# Patient Record
Sex: MALE | Race: BLACK OR AFRICAN AMERICAN | NOT HISPANIC OR LATINO | Employment: OTHER | URBAN - METROPOLITAN AREA
[De-identification: names, ages, dates, MRNs, and addresses within clinical notes are randomized per-mention and may not be internally consistent; named-entity substitution may affect disease eponyms.]

---

## 2021-12-06 ENCOUNTER — TELEPHONE (OUTPATIENT)
Dept: TRANSPLANT | Facility: CLINIC | Age: 66
End: 2021-12-06
Payer: MEDICARE

## 2021-12-06 DIAGNOSIS — I50.9 CONGESTIVE HEART FAILURE, UNSPECIFIED HF CHRONICITY, UNSPECIFIED HEART FAILURE TYPE: Primary | ICD-10-CM

## 2021-12-29 ENCOUNTER — LAB VISIT (OUTPATIENT)
Dept: LAB | Facility: HOSPITAL | Age: 66
End: 2021-12-29
Attending: INTERNAL MEDICINE
Payer: MEDICARE

## 2021-12-29 ENCOUNTER — OFFICE VISIT (OUTPATIENT)
Dept: TRANSPLANT | Facility: CLINIC | Age: 66
End: 2021-12-29
Attending: INTERNAL MEDICINE
Payer: COMMERCIAL

## 2021-12-29 ENCOUNTER — HOSPITAL ENCOUNTER (OUTPATIENT)
Dept: PULMONOLOGY | Facility: CLINIC | Age: 66
Discharge: HOME | End: 2021-12-29
Attending: INTERNAL MEDICINE
Payer: COMMERCIAL

## 2021-12-29 VITALS
BODY MASS INDEX: 29.03 KG/M2 | WEIGHT: 226.19 LBS | HEART RATE: 89 BPM | HEIGHT: 74 IN | DIASTOLIC BLOOD PRESSURE: 67 MMHG | SYSTOLIC BLOOD PRESSURE: 103 MMHG

## 2021-12-29 VITALS — WEIGHT: 237 LBS | BODY MASS INDEX: 30.42 KG/M2 | HEIGHT: 74 IN

## 2021-12-29 DIAGNOSIS — I73.9 CLAUDICATION IN PERIPHERAL VASCULAR DISEASE: ICD-10-CM

## 2021-12-29 DIAGNOSIS — I50.9 CONGESTIVE HEART FAILURE, UNSPECIFIED HF CHRONICITY, UNSPECIFIED HEART FAILURE TYPE: ICD-10-CM

## 2021-12-29 DIAGNOSIS — I34.0 NONRHEUMATIC MITRAL VALVE REGURGITATION: ICD-10-CM

## 2021-12-29 DIAGNOSIS — I27.22 PULMONARY HYPERTENSION DUE TO LEFT HEART DISEASE: ICD-10-CM

## 2021-12-29 DIAGNOSIS — I25.5 ISCHEMIC CARDIOMYOPATHY: ICD-10-CM

## 2021-12-29 DIAGNOSIS — E78.5 HYPERLIPIDEMIA LDL GOAL <70: ICD-10-CM

## 2021-12-29 DIAGNOSIS — I73.9 PERIPHERAL VASCULAR DISEASE: ICD-10-CM

## 2021-12-29 DIAGNOSIS — I25.10 CORONARY ARTERY DISEASE INVOLVING NATIVE CORONARY ARTERY OF NATIVE HEART WITHOUT ANGINA PECTORIS: ICD-10-CM

## 2021-12-29 DIAGNOSIS — I50.42 CHRONIC COMBINED SYSTOLIC AND DIASTOLIC CONGESTIVE HEART FAILURE: Primary | ICD-10-CM

## 2021-12-29 LAB
ALBUMIN SERPL BCP-MCNC: 3.4 G/DL (ref 3.5–5.2)
ALP SERPL-CCNC: 221 U/L (ref 55–135)
ALT SERPL W/O P-5'-P-CCNC: 22 U/L (ref 10–44)
ANION GAP SERPL CALC-SCNC: 8 MMOL/L (ref 8–16)
AST SERPL-CCNC: 21 U/L (ref 10–40)
BASOPHILS # BLD AUTO: 0.02 K/UL (ref 0–0.2)
BASOPHILS NFR BLD: 0.4 % (ref 0–1.9)
BILIRUB SERPL-MCNC: 1.4 MG/DL (ref 0.1–1)
BNP SERPL-MCNC: 1128 PG/ML (ref 0–99)
BUN SERPL-MCNC: 11 MG/DL (ref 8–23)
CALCIUM SERPL-MCNC: 9.3 MG/DL (ref 8.7–10.5)
CHLORIDE SERPL-SCNC: 105 MMOL/L (ref 95–110)
CO2 SERPL-SCNC: 27 MMOL/L (ref 23–29)
CREAT SERPL-MCNC: 0.9 MG/DL (ref 0.5–1.4)
DIFFERENTIAL METHOD: ABNORMAL
EOSINOPHIL # BLD AUTO: 0.1 K/UL (ref 0–0.5)
EOSINOPHIL NFR BLD: 1.6 % (ref 0–8)
ERYTHROCYTE [DISTWIDTH] IN BLOOD BY AUTOMATED COUNT: 15.2 % (ref 11.5–14.5)
EST. GFR  (AFRICAN AMERICAN): >60 ML/MIN/1.73 M^2
EST. GFR  (NON AFRICAN AMERICAN): >60 ML/MIN/1.73 M^2
GLUCOSE SERPL-MCNC: 216 MG/DL (ref 70–110)
HCT VFR BLD AUTO: 40.9 % (ref 40–54)
HGB BLD-MCNC: 13.2 G/DL (ref 14–18)
IMM GRANULOCYTES # BLD AUTO: 0.01 K/UL (ref 0–0.04)
IMM GRANULOCYTES NFR BLD AUTO: 0.2 % (ref 0–0.5)
LYMPHOCYTES # BLD AUTO: 1.7 K/UL (ref 1–4.8)
LYMPHOCYTES NFR BLD: 33.9 % (ref 18–48)
MCH RBC QN AUTO: 29 PG (ref 27–31)
MCHC RBC AUTO-ENTMCNC: 32.3 G/DL (ref 32–36)
MCV RBC AUTO: 90 FL (ref 82–98)
MONOCYTES # BLD AUTO: 0.4 K/UL (ref 0.3–1)
MONOCYTES NFR BLD: 8.4 % (ref 4–15)
NEUTROPHILS # BLD AUTO: 2.8 K/UL (ref 1.8–7.7)
NEUTROPHILS NFR BLD: 55.5 % (ref 38–73)
NRBC BLD-RTO: 0 /100 WBC
PLATELET # BLD AUTO: 149 K/UL (ref 150–450)
PMV BLD AUTO: 10 FL (ref 9.2–12.9)
POTASSIUM SERPL-SCNC: 3.5 MMOL/L (ref 3.5–5.1)
PROT SERPL-MCNC: 7.3 G/DL (ref 6–8.4)
RBC # BLD AUTO: 4.55 M/UL (ref 4.6–6.2)
SODIUM SERPL-SCNC: 140 MMOL/L (ref 136–145)
TSH SERPL DL<=0.005 MIU/L-ACNC: 0.89 UIU/ML (ref 0.4–4)
WBC # BLD AUTO: 4.98 K/UL (ref 3.9–12.7)

## 2021-12-29 PROCEDURE — 3288F FALL RISK ASSESSMENT DOCD: CPT | Mod: CPTII,S$GLB,TXP, | Performed by: INTERNAL MEDICINE

## 2021-12-29 PROCEDURE — 1101F PR PT FALLS ASSESS DOC 0-1 FALLS W/OUT INJ PAST YR: ICD-10-PCS | Mod: CPTII,S$GLB,TXP, | Performed by: INTERNAL MEDICINE

## 2021-12-29 PROCEDURE — 3008F BODY MASS INDEX DOCD: CPT | Mod: CPTII,S$GLB,TXP, | Performed by: INTERNAL MEDICINE

## 2021-12-29 PROCEDURE — 36415 COLL VENOUS BLD VENIPUNCTURE: CPT | Mod: NTX | Performed by: INTERNAL MEDICINE

## 2021-12-29 PROCEDURE — 99999 PR PBB SHADOW E&M-EST. PATIENT-LVL III: ICD-10-PCS | Mod: PBBFAC,TXP,, | Performed by: INTERNAL MEDICINE

## 2021-12-29 PROCEDURE — 83880 ASSAY OF NATRIURETIC PEPTIDE: CPT | Mod: NTX | Performed by: INTERNAL MEDICINE

## 2021-12-29 PROCEDURE — 84443 ASSAY THYROID STIM HORMONE: CPT | Mod: NTX | Performed by: INTERNAL MEDICINE

## 2021-12-29 PROCEDURE — 3074F SYST BP LT 130 MM HG: CPT | Mod: CPTII,S$GLB,TXP, | Performed by: INTERNAL MEDICINE

## 2021-12-29 PROCEDURE — 1101F PT FALLS ASSESS-DOCD LE1/YR: CPT | Mod: CPTII,S$GLB,TXP, | Performed by: INTERNAL MEDICINE

## 2021-12-29 PROCEDURE — 99244 OFF/OP CNSLTJ NEW/EST MOD 40: CPT | Mod: S$GLB,TXP,, | Performed by: INTERNAL MEDICINE

## 2021-12-29 PROCEDURE — 3288F PR FALLS RISK ASSESSMENT DOCUMENTED: ICD-10-PCS | Mod: CPTII,S$GLB,TXP, | Performed by: INTERNAL MEDICINE

## 2021-12-29 PROCEDURE — 99999 PR PBB SHADOW E&M-EST. PATIENT-LVL III: CPT | Mod: PBBFAC,TXP,, | Performed by: INTERNAL MEDICINE

## 2021-12-29 PROCEDURE — 1126F AMNT PAIN NOTED NONE PRSNT: CPT | Mod: CPTII,S$GLB,TXP, | Performed by: INTERNAL MEDICINE

## 2021-12-29 PROCEDURE — 94618 PULMONARY STRESS TESTING: ICD-10-PCS | Mod: NTX,S$GLB,, | Performed by: INTERNAL MEDICINE

## 2021-12-29 PROCEDURE — 1159F MED LIST DOCD IN RCRD: CPT | Mod: CPTII,S$GLB,TXP, | Performed by: INTERNAL MEDICINE

## 2021-12-29 PROCEDURE — 3008F PR BODY MASS INDEX (BMI) DOCUMENTED: ICD-10-PCS | Mod: CPTII,S$GLB,TXP, | Performed by: INTERNAL MEDICINE

## 2021-12-29 PROCEDURE — 94618 PULMONARY STRESS TESTING: CPT | Mod: NTX,S$GLB,, | Performed by: INTERNAL MEDICINE

## 2021-12-29 PROCEDURE — 1159F PR MEDICATION LIST DOCUMENTED IN MEDICAL RECORD: ICD-10-PCS | Mod: CPTII,S$GLB,TXP, | Performed by: INTERNAL MEDICINE

## 2021-12-29 PROCEDURE — 85025 COMPLETE CBC W/AUTO DIFF WBC: CPT | Mod: NTX | Performed by: INTERNAL MEDICINE

## 2021-12-29 PROCEDURE — 99244 PR OFFICE CONSULTATION,LEVEL IV: ICD-10-PCS | Mod: S$GLB,TXP,, | Performed by: INTERNAL MEDICINE

## 2021-12-29 PROCEDURE — 3078F DIAST BP <80 MM HG: CPT | Mod: CPTII,S$GLB,TXP, | Performed by: INTERNAL MEDICINE

## 2021-12-29 PROCEDURE — 1160F RVW MEDS BY RX/DR IN RCRD: CPT | Mod: CPTII,S$GLB,TXP, | Performed by: INTERNAL MEDICINE

## 2021-12-29 PROCEDURE — 3078F PR MOST RECENT DIASTOLIC BLOOD PRESSURE < 80 MM HG: ICD-10-PCS | Mod: CPTII,S$GLB,TXP, | Performed by: INTERNAL MEDICINE

## 2021-12-29 PROCEDURE — 1160F PR REVIEW ALL MEDS BY PRESCRIBER/CLIN PHARMACIST DOCUMENTED: ICD-10-PCS | Mod: CPTII,S$GLB,TXP, | Performed by: INTERNAL MEDICINE

## 2021-12-29 PROCEDURE — 1126F PR PAIN SEVERITY QUANTIFIED, NO PAIN PRESENT: ICD-10-PCS | Mod: CPTII,S$GLB,TXP, | Performed by: INTERNAL MEDICINE

## 2021-12-29 PROCEDURE — 3074F PR MOST RECENT SYSTOLIC BLOOD PRESSURE < 130 MM HG: ICD-10-PCS | Mod: CPTII,S$GLB,TXP, | Performed by: INTERNAL MEDICINE

## 2021-12-29 PROCEDURE — 80053 COMPREHEN METABOLIC PANEL: CPT | Mod: NTX | Performed by: INTERNAL MEDICINE

## 2021-12-29 RX ORDER — SPIRONOLACTONE 25 MG/1
25 TABLET ORAL DAILY
COMMUNITY
Start: 2021-12-29

## 2021-12-29 RX ORDER — METOPROLOL SUCCINATE 25 MG/1
25 TABLET, EXTENDED RELEASE ORAL 2 TIMES DAILY
COMMUNITY
Start: 2021-12-02 | End: 2022-04-05

## 2021-12-29 RX ORDER — VITAMIN B COMPLEX
1 CAPSULE ORAL DAILY
COMMUNITY

## 2021-12-29 RX ORDER — ATORVASTATIN CALCIUM 80 MG/1
80 TABLET, FILM COATED ORAL DAILY
COMMUNITY
Start: 2021-10-22 | End: 2022-04-04

## 2021-12-29 RX ORDER — CLOPIDOGREL BISULFATE 75 MG/1
75 TABLET ORAL DAILY
COMMUNITY
Start: 2021-12-20 | End: 2022-04-05

## 2021-12-29 RX ORDER — VIT C/E/ZN/COPPR/LUTEIN/ZEAXAN 250MG-90MG
1000 CAPSULE ORAL DAILY
COMMUNITY

## 2021-12-29 RX ORDER — POTASSIUM CHLORIDE 20 MEQ/1
20 TABLET, EXTENDED RELEASE ORAL DAILY
COMMUNITY
Start: 2021-10-22

## 2021-12-29 RX ORDER — BUMETANIDE 1 MG/1
1 TABLET ORAL DAILY
COMMUNITY
Start: 2021-11-18

## 2021-12-29 RX ORDER — SACUBITRIL AND VALSARTAN 49; 51 MG/1; MG/1
49-51 TABLET, FILM COATED ORAL 2 TIMES DAILY
COMMUNITY
Start: 2021-10-28

## 2021-12-29 NOTE — LETTER
January 2, 2022        Rico Barnhart  [address] [place] [address]-[place] [address]  Phone: [phone]  Fax: [phone]             [id number]  [address]  [facility] [address]  Phone: [phone]   Patient: Avery Earl   MR Number: [id number]   YOB: 1955   Date of Visit: 12/29/2021       Dear Dr. Rico Barnhart    Thank you for referring Avery Earl to me for evaluation. Attached you will find relevant portions of my assessment and plan of care.    If you have questions, please do not hesitate to call me. I look forward to following Avery Earl along with you.    Sincerely,    Duarte Simpson Jr, MD    Enclosure    If you would like to receive this communication electronically, please contact [email or web address] or [phone] to request InnoPad Link access.    InnoPad Link is a tool which provides read-only access to select patient information with whom you have a relationship. Its easy to use and provides real time access to review your patients record including encounter summaries, notes, results, and demographic information.    If you feel you have received this communication in error or would no longer like to receive these types of communications, please e-mail [email or web address]

## 2022-01-02 PROBLEM — I34.0 NONRHEUMATIC MITRAL VALVE REGURGITATION: Status: ACTIVE | Noted: 2022-01-02

## 2022-01-02 PROBLEM — I25.5 ISCHEMIC CARDIOMYOPATHY: Status: ACTIVE | Noted: 2022-01-02

## 2022-01-02 PROBLEM — I73.9 PERIPHERAL VASCULAR DISEASE: Status: ACTIVE | Noted: 2022-01-02

## 2022-01-02 PROBLEM — I27.22 PULMONARY HYPERTENSION DUE TO LEFT HEART DISEASE: Status: ACTIVE | Noted: 2022-01-02

## 2022-01-02 PROBLEM — E78.5 HYPERLIPIDEMIA LDL GOAL <70: Status: ACTIVE | Noted: 2022-01-02

## 2022-01-02 PROBLEM — I25.10 CORONARY ARTERY DISEASE INVOLVING NATIVE CORONARY ARTERY OF NATIVE HEART WITHOUT ANGINA PECTORIS: Status: ACTIVE | Noted: 2022-01-02

## 2022-01-02 NOTE — PROGRESS NOTES
Subjective:   Initial evaluation of heart transplant candidacy.    HPI:  Mr. Earl is a 66 y.o. year old Black or  male who has presents to be considered for advanced surgical options.  He has a history of coronary artery disease with prior coronary bypass surgery.  He had recent angiogram that demonstrated patent bypass grafts to the LAD and right PDA with severe mitral insufficiency and pulmonary hypertension secondary to left heart disease.  He has extensive peripheral arterial disease with prior revascularization procedures and ongoing claudication.  He is a former smoker but quit in 1990. He was drinking 3 shots of liquor per day but quit this 2 months ago.  He has never used any illicit drugs.  His last angiogram February 2021 described patent bypass grafts.  He has severe left ventricular systolic dysfunction and severe mitral insufficiency.    At this time he denies any angina.  He does have dyspnea on exertion as well as leg claudication both which limit him.  He sleeps on 2 pillows.  No PND spells.  No symptomatic arrhythmia.      He reports an episode of pneumonia earlier this month.    Past Medical History:   Diagnosis Date    Arthritis     Cardiomyopathy     Ischemic    CHF (congestive heart failure)     Coronary artery disease     Dyslipidemia     Hyperlipidemia     Hypertension     Mitral insufficiency     Myocardial infarction     Peripheral arterial disease     Pneumonia 12/2021    Pulmonary hypertension      Past Surgical History:   Procedure Laterality Date    CORONARY ARTERY BYPASS GRAFT      Lima to LAD, saphenous vein graft to right PDA    INSERTION OF IMPLANTABLE CARDIOVERTER-DEFIBRILLATOR (ICD) GENERATOR WITH TWO EXISTING LEADS       Review of Systems   Constitutional: Positive for malaise/fatigue. Negative for chills, fever, night sweats, weight gain and weight loss.   Cardiovascular: Positive for claudication, dyspnea on exertion, leg swelling and orthopnea (  "sleeps on 2 pillows). Negative for chest pain, irregular heartbeat, near-syncope, palpitations, paroxysmal nocturnal dyspnea and syncope.   Respiratory: Positive for cough, shortness of breath and sputum production. Negative for hemoptysis ( he had hemoptysis with his pneumonia but reports this has resolved) and wheezing.    Hematologic/Lymphatic: Does not bruise/bleed easily.   Musculoskeletal: Positive for arthritis, joint pain ( right knee) and stiffness.   Gastrointestinal: Negative for change in bowel habit, hematochezia, melena, nausea and vomiting.   Genitourinary: Negative for hematuria.   Neurological: Positive for weakness. Negative for brief paralysis, dizziness, focal weakness, light-headedness and seizures.       Objective:   Blood pressure 103/67, pulse 89, height 6' 2" (1.88 m), weight 102.6 kg (226 lb 3.1 oz).body mass index is 29.04 kg/m².    Physical Exam  Constitutional:       General: He is not in acute distress.     Appearance: He is well-developed and well-nourished. He is not toxic-appearing or diaphoretic.      Comments: /67 (BP Location: Left arm, Patient Position: Sitting, BP Method: Medium (Automatic))   Pulse 89   Ht 6' 2" (1.88 m)   Wt 102.6 kg (226 lb 3.1 oz)   BMI 29.04 kg/m²    HENT:      Head: Normocephalic and atraumatic.   Eyes:      General: No scleral icterus.        Right eye: No discharge.         Left eye: No discharge.      Conjunctiva/sclera: Conjunctivae normal.   Neck:      Thyroid: No thyromegaly.      Vascular: No JVD.      Trachea: No tracheal deviation.   Cardiovascular:      Rate and Rhythm: Normal rate and regular rhythm.      Heart sounds: Murmur (Grade 1/6 systolic murmur over the precordium) heard.   No gallop.    Pulmonary:      Effort: Pulmonary effort is normal.      Breath sounds: Normal breath sounds.   Abdominal:      General: Bowel sounds are normal. There is no distension ( liver span 12 cm).      Palpations: Abdomen is soft. There is no mass.    "   Tenderness: There is no abdominal tenderness. There is no guarding or rebound.   Musculoskeletal:         General: Swelling ( both legs with chronic stasis changes) present. No tenderness or edema.   Skin:     General: Skin is warm and dry.   Neurological:      General: No focal deficit present.      Mental Status: He is alert.      Comments: Grossly intact   Psychiatric:         Mood and Affect: Mood and affect and mood normal.         Behavior: Behavior normal.         Thought Content: Thought content normal.         Judgment: Judgment normal.       Lab Results   Component Value Date    BNP 1,128 (H) 12/29/2021     12/29/2021    K 3.5 12/29/2021     12/29/2021    CO2 27 12/29/2021    BUN 11 12/29/2021    CREATININE 0.9 12/29/2021     (H) 12/29/2021    AST 21 12/29/2021    ALT 22 12/29/2021    ALBUMIN 3.4 (L) 12/29/2021    PROT 7.3 12/29/2021    BILITOT 1.4 (H) 12/29/2021    WBC 4.98 12/29/2021    HGB 13.2 (L) 12/29/2021    HCT 40.9 12/29/2021     (L) 12/29/2021    TSH 0.891 12/29/2021 12/29/2021 6 minute walk test walked 305 m oxygen saturations 100% prior to exercise 9 absent post exercise and 100% recovery.  He did not stop during the walk telling me that he paced himself to avoid claudication as well as his shortness of breath.  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  RIGHT LOWER EXTREMITY:  Right common femoral artery 50%, 99% narrowing of the ostium of the right profundus artery, right superficial femoral artery severely calcified and diffusely diseased, 70% proximal  superficial femoral artery narrowing, 90% mid superficial femoral artery narrowing and 70% distal superficial femoral artery narrowing.  He % narrowing of the right popliteal artery.  Infrapopliteal vessels poorly visualized with 1 vessel runoff to the foot.  LEFT LOWER EXTREMITY:  Left common femoral artery 50-60% stenosis, left superficial femoral artery calcified and diffusely diseased with 80-90% proximal narrowing.  There is a 70% InStent restenosis within the left superficial femoral artery stent and a chronic total occlusion of the P1 segment of the left popliteal artery.  Infrapopliteal vessels are poorly visualized with what appears to be 1 vessel filled by collaterals which supplies runoff to the foot.    02/23/2021 cardiac catheterization:  I do not have the complete report but reportedly the internal mammary to LAD was patent and the vein graft to the right PDA was patent.    Outside echocardiograms per report demonstrated severe mitral insufficiency and severe left ventricular systolic dysfunction but I do not have that study    Assessment:      1. Chronic combined systolic and diastolic congestive heart failure    2. Nonrheumatic mitral valve regurgitation    3. Pulmonary hypertension due to left heart disease    4. Ischemic cardiomyopathy    5. Coronary artery disease involving native coronary artery of native heart without angina pectoris    6. Peripheral vascular disease    7. Claudication in peripheral vascular disease    8. Hyperlipidemia LDL goal <70        Plan:   I discussed with the patient that in my opinion he was not a suitable candidate for cardiac transplantation in view of his significant peripheral vascular disease.  I also discussed that LVAD due to its non pulsatile flow frequently worsens claudication but that if he was interested in pursuing I would like to get his old angiograms and have him see Dr. Crump, CTS.  The I explained to the patient and his wife as to what an LVAD is.   The patient tells me that he would never consider another surgical procedure :  On my heart.  He want to discuss the role of mitral valve clip and I told the patient and his wife that if a patient is not a suitable candidate for advanced options that are group as pursued this.  I also discussed that many centers would offer mitral clip prior to advanced options if the patient was a suitable candidate.  In his case, a mitral clip procedure if he has appropriate valve anatomy should be explored.  It sounds as though my approach is similar to recommendations of Dr. Barnhart.    At the end of the visit I confirmed once more that he has no interest in pursuing advanced options even if he was found to be a suitable candidate.  I have concerns that he would not be a suitable candidate but since he would not considered anyway I see no reason to pursue this evaluation further and he will return to the care of Dr. Barnhart.      Patient is now NYHA III ACC stage D    Recommend 2 gram sodium restriction and 1500cc fluid restriction.  Encourage physical activity with graded exercise program.  Requested patient to weigh themselves daily, and to notify us if their weight increases by more than 3 lbs in 1 day or 5 lbs in 1 week.     Transplant/LVAD Candidacy:  See discussion above but in summary I do not believe that he is a suitable candidate for heart transplant and because of his peripheral vascular disease and claudication doubt that he would be suitable for LVAD.  Since he would not pursue any procedure that would involve operating upon his heart further evaluation for LVAD has not been ordered.  If he changes his mind then I think the best approach would be for him to meet with Dr. Crump, obtain a CT of chest abdomen and pelvis without contrast and bring his peripheral angiogram study to meet with Dr. Chad Elder of Interventional Cardiology.    Discussed with the patient and family [facility]  program outcomes as reported in INTERMACS (Interagency Registry for Mechanically Assisted Circulatory Support):    1 year survival = 92.7%  2 year survival = 86.7%  3 year survival = 86.7%    Patient and family acknowledged receipt of this information and all questions were answered.     UNOS Patient Status  Functional Status: 50% - Requires considerable assistance and frequent medical care  Physical Capacity: Limited Mobility  Working for Income: Unknown      Thank you for allowing me to see this nice man in consultation and do not hesitate to contact me if any questions arise.    Duarte Simpson Jr, MD

## 2022-04-04 ENCOUNTER — LAB VISIT (OUTPATIENT)
Dept: LAB | Facility: HOSPITAL | Age: 67
End: 2022-04-04
Attending: INTERNAL MEDICINE
Payer: COMMERCIAL

## 2022-04-04 ENCOUNTER — HOSPITAL ENCOUNTER (OUTPATIENT)
Dept: CARDIOLOGY | Facility: HOSPITAL | Age: 67
Discharge: HOME | End: 2022-04-04
Attending: INTERNAL MEDICINE
Payer: COMMERCIAL

## 2022-04-04 ENCOUNTER — OFFICE VISIT (OUTPATIENT)
Dept: CARDIOLOGY | Facility: CLINIC | Age: 67
End: 2022-04-04
Payer: COMMERCIAL

## 2022-04-04 VITALS
BODY MASS INDEX: 25.16 KG/M2 | DIASTOLIC BLOOD PRESSURE: 62 MMHG | WEIGHT: 196 LBS | HEART RATE: 86 BPM | SYSTOLIC BLOOD PRESSURE: 96 MMHG | OXYGEN SATURATION: 98 %

## 2022-04-04 DIAGNOSIS — R17 JAUNDICE: Primary | ICD-10-CM

## 2022-04-04 DIAGNOSIS — I25.10 CORONARY ARTERY DISEASE INVOLVING NATIVE CORONARY ARTERY OF NATIVE HEART WITHOUT ANGINA PECTORIS: ICD-10-CM

## 2022-04-04 DIAGNOSIS — I27.22 PULMONARY HYPERTENSION DUE TO LEFT HEART DISEASE: ICD-10-CM

## 2022-04-04 DIAGNOSIS — Z76.89 ESTABLISHING CARE WITH NEW DOCTOR, ENCOUNTER FOR: ICD-10-CM

## 2022-04-04 DIAGNOSIS — E78.5 HYPERLIPIDEMIA LDL GOAL <70: ICD-10-CM

## 2022-04-04 DIAGNOSIS — Z95.810 ICD (IMPLANTABLE CARDIOVERTER-DEFIBRILLATOR) IN PLACE: ICD-10-CM

## 2022-04-04 DIAGNOSIS — I50.42 CHRONIC COMBINED SYSTOLIC AND DIASTOLIC CONGESTIVE HEART FAILURE: ICD-10-CM

## 2022-04-04 DIAGNOSIS — I73.9 CLAUDICATION IN PERIPHERAL VASCULAR DISEASE: ICD-10-CM

## 2022-04-04 DIAGNOSIS — R17 JAUNDICE: ICD-10-CM

## 2022-04-04 DIAGNOSIS — Z76.89 ESTABLISHING CARE WITH NEW DOCTOR, ENCOUNTER FOR: Primary | ICD-10-CM

## 2022-04-04 DIAGNOSIS — I25.5 ISCHEMIC CARDIOMYOPATHY: ICD-10-CM

## 2022-04-04 DIAGNOSIS — I34.0 NONRHEUMATIC MITRAL VALVE REGURGITATION: ICD-10-CM

## 2022-04-04 LAB
BASOPHILS # BLD AUTO: 0.04 K/UL (ref 0–0.2)
BASOPHILS NFR BLD: 0.8 % (ref 0–1.9)
DIFFERENTIAL METHOD: ABNORMAL
EOSINOPHIL # BLD AUTO: 0.1 K/UL (ref 0–0.5)
EOSINOPHIL NFR BLD: 2.3 % (ref 0–8)
ERYTHROCYTE [DISTWIDTH] IN BLOOD BY AUTOMATED COUNT: 16.9 % (ref 11.5–14.5)
HCT VFR BLD AUTO: 29.2 % (ref 40–54)
HGB BLD-MCNC: 10.4 G/DL (ref 14–18)
IMM GRANULOCYTES # BLD AUTO: 0.02 K/UL (ref 0–0.04)
IMM GRANULOCYTES NFR BLD AUTO: 0.4 % (ref 0–0.5)
LYMPHOCYTES # BLD AUTO: 1.4 K/UL (ref 1–4.8)
LYMPHOCYTES NFR BLD: 27.1 % (ref 18–48)
MCH RBC QN AUTO: 28.7 PG (ref 27–31)
MCHC RBC AUTO-ENTMCNC: 35.6 G/DL (ref 32–36)
MCV RBC AUTO: 81 FL (ref 82–98)
MONOCYTES # BLD AUTO: 0.6 K/UL (ref 0.3–1)
MONOCYTES NFR BLD: 10.6 % (ref 4–15)
NEUTROPHILS # BLD AUTO: 3.1 K/UL (ref 1.8–7.7)
NEUTROPHILS NFR BLD: 58.8 % (ref 38–73)
NRBC BLD-RTO: 0 /100 WBC
PLATELET # BLD AUTO: 269 K/UL (ref 150–450)
PMV BLD AUTO: 10.4 FL (ref 9.2–12.9)
RBC # BLD AUTO: 3.62 M/UL (ref 4.6–6.2)
WBC # BLD AUTO: 5.2 K/UL (ref 3.9–12.7)

## 2022-04-04 PROCEDURE — 85025 COMPLETE CBC W/AUTO DIFF WBC: CPT | Performed by: INTERNAL MEDICINE

## 2022-04-04 PROCEDURE — 1101F PR PT FALLS ASSESS DOC 0-1 FALLS W/OUT INJ PAST YR: ICD-10-PCS | Mod: CPTII,S$GLB,, | Performed by: INTERNAL MEDICINE

## 2022-04-04 PROCEDURE — 99205 OFFICE O/P NEW HI 60 MIN: CPT | Mod: S$GLB,,, | Performed by: INTERNAL MEDICINE

## 2022-04-04 PROCEDURE — 80053 COMPREHEN METABOLIC PANEL: CPT | Performed by: INTERNAL MEDICINE

## 2022-04-04 PROCEDURE — 3074F SYST BP LT 130 MM HG: CPT | Mod: CPTII,S$GLB,, | Performed by: INTERNAL MEDICINE

## 2022-04-04 PROCEDURE — 36415 COLL VENOUS BLD VENIPUNCTURE: CPT | Performed by: INTERNAL MEDICINE

## 2022-04-04 PROCEDURE — 3008F PR BODY MASS INDEX (BMI) DOCUMENTED: ICD-10-PCS | Mod: CPTII,S$GLB,, | Performed by: INTERNAL MEDICINE

## 2022-04-04 PROCEDURE — 1126F PR PAIN SEVERITY QUANTIFIED, NO PAIN PRESENT: ICD-10-PCS | Mod: CPTII,S$GLB,, | Performed by: INTERNAL MEDICINE

## 2022-04-04 PROCEDURE — 99999 PR PBB SHADOW E&M-EST. PATIENT-LVL III: ICD-10-PCS | Mod: PBBFAC,,, | Performed by: INTERNAL MEDICINE

## 2022-04-04 PROCEDURE — 3074F PR MOST RECENT SYSTOLIC BLOOD PRESSURE < 130 MM HG: ICD-10-PCS | Mod: CPTII,S$GLB,, | Performed by: INTERNAL MEDICINE

## 2022-04-04 PROCEDURE — 99999 PR PBB SHADOW E&M-EST. PATIENT-LVL III: CPT | Mod: PBBFAC,,, | Performed by: INTERNAL MEDICINE

## 2022-04-04 PROCEDURE — 99205 PR OFFICE/OUTPT VISIT, NEW, LEVL V, 60-74 MIN: ICD-10-PCS | Mod: S$GLB,,, | Performed by: INTERNAL MEDICINE

## 2022-04-04 PROCEDURE — 3008F BODY MASS INDEX DOCD: CPT | Mod: CPTII,S$GLB,, | Performed by: INTERNAL MEDICINE

## 2022-04-04 PROCEDURE — 3288F FALL RISK ASSESSMENT DOCD: CPT | Mod: CPTII,S$GLB,, | Performed by: INTERNAL MEDICINE

## 2022-04-04 PROCEDURE — 3078F DIAST BP <80 MM HG: CPT | Mod: CPTII,S$GLB,, | Performed by: INTERNAL MEDICINE

## 2022-04-04 PROCEDURE — 3078F PR MOST RECENT DIASTOLIC BLOOD PRESSURE < 80 MM HG: ICD-10-PCS | Mod: CPTII,S$GLB,, | Performed by: INTERNAL MEDICINE

## 2022-04-04 PROCEDURE — 1126F AMNT PAIN NOTED NONE PRSNT: CPT | Mod: CPTII,S$GLB,, | Performed by: INTERNAL MEDICINE

## 2022-04-04 PROCEDURE — 93010 EKG 12-LEAD: ICD-10-PCS | Mod: ,,, | Performed by: INTERNAL MEDICINE

## 2022-04-04 PROCEDURE — 1101F PT FALLS ASSESS-DOCD LE1/YR: CPT | Mod: CPTII,S$GLB,, | Performed by: INTERNAL MEDICINE

## 2022-04-04 PROCEDURE — 3288F PR FALLS RISK ASSESSMENT DOCUMENTED: ICD-10-PCS | Mod: CPTII,S$GLB,, | Performed by: INTERNAL MEDICINE

## 2022-04-04 PROCEDURE — 93010 ELECTROCARDIOGRAM REPORT: CPT | Mod: ,,, | Performed by: INTERNAL MEDICINE

## 2022-04-04 PROCEDURE — 93005 ELECTROCARDIOGRAM TRACING: CPT

## 2022-04-04 RX ORDER — FOLIC ACID 1 MG/1
1 TABLET ORAL DAILY
COMMUNITY
End: 2022-04-05

## 2022-04-04 RX ORDER — NAPROXEN SODIUM 220 MG/1
81 TABLET, FILM COATED ORAL DAILY
COMMUNITY

## 2022-04-04 NOTE — PROGRESS NOTES
Subjective:   Patient ID:  Avery Earl is a 66 y.o. male who presents for evaluation of No chief complaint on file.      HPI   Patient comes in with 2 weeks of jaundice.  No abdominal pain.  No bleeding.  66-year-old male with history of systolic congestive heart failure, possibly combination of nonischemic and ischemic cardiomyopathy,  Status post ICD in 2017.  Reports CABG x2 after his ICD.  With none recovered EF.  Review of records shows that he had a LIMA to LAD and SVG to RCA that were patent on recent angiogram last month March 2022. His coronary angiogram was done at our [facility].  He was evaluated for a mitral clip with Dr. Barnhart at [facility] and deemed not to be a candidate.  Also was evaluated for LVAD and transplant at [facility] and was also considered to be not a candidate for advanced options.  He was put on dobutamine drip at 3.4 mL/hr  He still reports dyspnea on exertion, NYHA 3.  However there is no fluid overload.  No leg edema.  No orthopnea.  No JVD.  He is on Entresto  It is unclear why or whether he is on Eliquis, no history of PE or AFib.  Asked to bring his medication next visit.  Also 1 of his medication lists shows that he is on olmesartan and Entresto     Patient has seen Dr. Funes.  Dr. Barnhart and Dr Simpson   Quit drinking around 10/2021   Had pneumonia in December 2022    Past Medical History:   Diagnosis Date    Arthritis     Cardiomyopathy     Ischemic    CHF (congestive heart failure)     Coronary artery disease     Dyslipidemia     Hyperlipidemia     Hypertension     Mitral insufficiency     Myocardial infarction     Peripheral arterial disease     Pneumonia 12/2021    Pulmonary hypertension        Past Surgical History:   Procedure Laterality Date    CORONARY ARTERY BYPASS GRAFT      Lima to LAD, saphenous vein graft to right PDA    INSERTION OF IMPLANTABLE CARDIOVERTER-DEFIBRILLATOR (ICD) GENERATOR WITH TWO EXISTING LEADS         Social  History     Tobacco Use    Smoking status: Former Smoker     Types: Cigarettes     Quit date:      Years since quittin.2    Smokeless tobacco: Never Used   Substance Use Topics    Alcohol use: Not Currently    Drug use: Never       History reviewed. No pertinent family history.    Review of Systems   Constitutional: Negative for fever and malaise/fatigue.   HENT: Negative for sore throat.    Eyes: Negative for blurred vision.   Cardiovascular: Positive for dyspnea on exertion. Negative for chest pain, claudication, cyanosis, irregular heartbeat, leg swelling, near-syncope, orthopnea, palpitations, paroxysmal nocturnal dyspnea and syncope.   Respiratory: Negative for cough and hemoptysis.    Hematologic/Lymphatic: Negative for bleeding problem.   Skin: Negative for rash.   Musculoskeletal: Negative for falls.   Gastrointestinal: Positive for jaundice. Negative for abdominal pain.   Genitourinary: Negative.    Neurological: Negative.    Psychiatric/Behavioral: Negative for altered mental status and substance abuse.       Current Outpatient Medications on File Prior to Visit   Medication Sig    apixaban (ELIQUIS) 5 mg Tab Take 5 mg by mouth once daily.    aspirin 81 MG Chew Take 81 mg by mouth once daily.    b complex vitamins capsule Take 1 capsule by mouth once daily.    bumetanide (BUMEX) 1 MG tablet Take 1 mg by mouth once daily.    cholecalciferol, vitamin D3, (VITAMIN D3) 25 mcg (1,000 unit) capsule Take 1,000 Units by mouth.    ENTRESTO 49-51 mg per tablet Take 49-51 tablets by mouth 2 (two) times a day.    folic acid (FOLVITE) 1 MG tablet Take 1 mg by mouth once daily.    metoprolol succinate (TOPROL-XL) 25 MG 24 hr tablet Take 25 mg by mouth 2 (two) times a day.    omega-3/dha/epa/ala/vitamin D3 (OMEGA-3-DHA-EPA-ALA-VIT D3 ORAL) Take 2 capsules by mouth.    potassium chloride SA (K-DUR,KLOR-CON) 20 MEQ tablet Take 20 mEq by mouth once daily.    clopidogreL (PLAVIX) 75 mg tablet Take 75  mg by mouth once daily.    spironolactone (ALDACTONE) 25 MG tablet Take 25 mg by mouth once daily.    [DISCONTINUED] atorvastatin (LIPITOR) 80 MG tablet Take 80 mg by mouth once daily.     No current facility-administered medications on file prior to visit.       Objective:   Objective:  Wt Readings from Last 3 Encounters:   04/04/22 88.9 kg (196 lb)   12/29/21 107.5 kg (237 lb)   12/29/21 102.6 kg (226 lb 3.1 oz)     Temp Readings from Last 3 Encounters:   No data found for Temp     BP Readings from Last 3 Encounters:   04/04/22 96/62   12/29/21 103/67     Pulse Readings from Last 3 Encounters:   04/04/22 86   12/29/21 89       Physical Exam  Vitals reviewed.   Constitutional:       Appearance: He is well-developed.   HENT:      Head: Normocephalic and atraumatic.   Eyes:      General: Scleral icterus present.      Conjunctiva/sclera: Conjunctivae normal.   Cardiovascular:      Rate and Rhythm: Normal rate and regular rhythm.      Pulses: Intact distal pulses.      Heart sounds: Normal heart sounds. No murmur heard.  Pulmonary:      Effort: No respiratory distress.      Breath sounds: No wheezing or rales.   Chest:      Chest wall: No tenderness.   Abdominal:      General: Bowel sounds are normal. There is no distension.      Palpations: Abdomen is soft.      Tenderness: There is no guarding.   Musculoskeletal:         General: Normal range of motion.      Cervical back: Normal range of motion and neck supple.   Skin:     General: Skin is warm.   Neurological:      Mental Status: He is alert and oriented to person, place, and time.         No results found for: CHOL  No results found for: HDL  No results found for: LDLCALC  No results found for: TRIG  No results found for: CHOLHDL    Chemistry        Component Value Date/Time     12/29/2021 1037    K 3.5 12/29/2021 1037     12/29/2021 1037    CO2 27 12/29/2021 1037    BUN 11 12/29/2021 1037    CREATININE 0.9 12/29/2021 1037     (H) 12/29/2021  1037        Component Value Date/Time    CALCIUM 9.3 12/29/2021 1037    ALKPHOS 221 (H) 12/29/2021 1037    AST 21 12/29/2021 1037    ALT 22 12/29/2021 1037    BILITOT 1.4 (H) 12/29/2021 1037    ESTGFRAFRICA >60.0 12/29/2021 1037    EGFRNONAA >60.0 12/29/2021 1037          Lab Results   Component Value Date    TSH 0.891 12/29/2021     No results found for: INR, PROTIME  Lab Results   Component Value Date    WBC 4.98 12/29/2021    HGB 13.2 (L) 12/29/2021    HCT 40.9 12/29/2021    MCV 90 12/29/2021     (L) 12/29/2021     BNP  @LABRCNTIP(BNP,BNPTRIAGEBLO)@  CrCl cannot be calculated (Patient's most recent lab result is older than the maximum 7 days allowed.).     Imaging:  ======  No results found for this or any previous visit.    No results found for this or any previous visit.    No results found for this or any previous visit.    No results found for this or any previous visit.    No results found for this or any previous visit.    No valid procedures specified.    Diagnostic Results:  ECG: Reviewed    The ASCVD Risk score ([place] DC Jr., et al., 2013) failed to calculate for the following reasons:    Cannot find a previous HDL lab    Cannot find a previous total cholesterol lab    Assessment and Plan:   Jaundice  -     Comprehensive Metabolic Panel; Future; Expected date: 04/04/2022  -     CBC Auto Differential; Future; Expected date: 04/04/2022  -     Protime-INR; Future; Expected date: 04/04/2022  -     Ambulatory referral/consult to Gastroenterology; Future; Expected date: 04/11/2022    Chronic combined systolic and diastolic congestive heart failure  -     Ambulatory referral/consult to Congestive Heart Failure Clinic; Future; Expected date: 04/11/2022    Nonrheumatic mitral valve regurgitation    Pulmonary hypertension due to left heart disease    Ischemic cardiomyopathy    Coronary artery disease involving native coronary artery of native heart without angina pectoris    Hyperlipidemia LDL goal  <70    ICD (implantable cardioverter-defibrillator) in place    Claudication in peripheral vascular disease    Will stop his Lipitor given his jaundice and await for liver function tests.  Patient asked to bring all his medication next visit due to multiple medication lists available to me with some contradictions  Continue with Entresto and Bumex and Toprol.  Euvolemic on exam today.  Blood pressure on low side of normal however no symptoms of lightheadedness  Reviewed records from the [facility] EF of 20% or less, severe mitral regurgitation and patent LIMA-lad and SVG graft to the RCA status post 2 vessel bypass.  Patient on dobutamine drip.  Referred to Heart failure Clinic for closer follow-up.  He was deemed not to be a candidate for LVAD, transplant or mitral clip was evaluated by [facility]  And at [facility]  He has an ICD he gets evaluated at Dr. Rodriguez clinic  I explained to the patient that at the moment his medical team is more focused on comfortable care given his end-stage heart disease.  Follow-up in Heart failure Clinic.

## 2022-04-05 ENCOUNTER — HOSPITAL ENCOUNTER (INPATIENT)
Facility: HOSPITAL | Age: 67
LOS: 1 days | Discharge: HOME HEALTH | DRG: 435 | End: 2022-04-09
Attending: EMERGENCY MEDICINE | Admitting: INTERNAL MEDICINE
Payer: COMMERCIAL

## 2022-04-05 ENCOUNTER — TELEPHONE (OUTPATIENT)
Dept: CARDIOLOGY | Facility: CLINIC | Age: 67
End: 2022-04-05
Payer: MEDICARE

## 2022-04-05 DIAGNOSIS — R07.9 CHEST PAIN: ICD-10-CM

## 2022-04-05 DIAGNOSIS — R17 JAUNDICE: ICD-10-CM

## 2022-04-05 DIAGNOSIS — R53.1 WEAKNESS: ICD-10-CM

## 2022-04-05 DIAGNOSIS — I50.9 CHF (CONGESTIVE HEART FAILURE): ICD-10-CM

## 2022-04-05 DIAGNOSIS — K86.89 PANCREATIC MASS: Primary | ICD-10-CM

## 2022-04-05 PROBLEM — Z01.818 PRE-OP EVALUATION: Status: ACTIVE | Noted: 2022-04-05

## 2022-04-05 PROBLEM — Z79.01 ON CONTINUOUS ORAL ANTICOAGULATION: Status: ACTIVE | Noted: 2022-04-05

## 2022-04-05 PROBLEM — D50.9 MICROCYTIC ANEMIA: Status: ACTIVE | Noted: 2022-04-05

## 2022-04-05 LAB
ALBUMIN SERPL BCP-MCNC: 2.4 G/DL (ref 3.5–5.2)
ALBUMIN SERPL BCP-MCNC: 2.5 G/DL (ref 3.5–5.2)
ALP SERPL-CCNC: 951 U/L (ref 55–135)
ALP SERPL-CCNC: 977 U/L (ref 55–135)
ALT SERPL W/O P-5'-P-CCNC: 57 U/L (ref 10–44)
ALT SERPL W/O P-5'-P-CCNC: 62 U/L (ref 10–44)
AMMONIA PLAS-SCNC: 26 UMOL/L (ref 10–50)
AMPHET+METHAMPHET UR QL: NEGATIVE
ANION GAP SERPL CALC-SCNC: 11 MMOL/L (ref 8–16)
ANION GAP SERPL CALC-SCNC: 13 MMOL/L (ref 8–16)
AORTIC ROOT ANNULUS: 3.83 CM
APAP SERPL-MCNC: 3 UG/ML (ref 10–20)
ASCENDING AORTA: 3.17 CM
AST SERPL-CCNC: 78 U/L (ref 10–40)
AST SERPL-CCNC: 90 U/L (ref 10–40)
AV INDEX (PROSTH): 0.33
AV MEAN GRADIENT: 13 MMHG
AV PEAK GRADIENT: 22 MMHG
AV VALVE AREA: 1.11 CM2
AV VELOCITY RATIO: 0.29
BACTERIA #/AREA URNS HPF: ABNORMAL /HPF
BARBITURATES UR QL SCN>200 NG/ML: NEGATIVE
BASOPHILS # BLD AUTO: 0.02 K/UL (ref 0–0.2)
BASOPHILS NFR BLD: 0.4 % (ref 0–1.9)
BENZODIAZ UR QL SCN>200 NG/ML: NEGATIVE
BILIRUB SERPL-MCNC: 18.5 MG/DL (ref 0.1–1)
BILIRUB SERPL-MCNC: 18.7 MG/DL (ref 0.1–1)
BILIRUB UR QL STRIP: ABNORMAL
BNP SERPL-MCNC: 607 PG/ML (ref 0–99)
BSA FOR ECHO PROCEDURE: 2.16 M2
BUN SERPL-MCNC: 17 MG/DL (ref 8–23)
BUN SERPL-MCNC: 17 MG/DL (ref 8–23)
BZE UR QL SCN: NEGATIVE
CALCIUM SERPL-MCNC: 9.6 MG/DL (ref 8.7–10.5)
CALCIUM SERPL-MCNC: 9.9 MG/DL (ref 8.7–10.5)
CANNABINOIDS UR QL SCN: NEGATIVE
CHLORIDE SERPL-SCNC: 98 MMOL/L (ref 95–110)
CHLORIDE SERPL-SCNC: 99 MMOL/L (ref 95–110)
CK SERPL-CCNC: 25 U/L (ref 20–200)
CLARITY UR: ABNORMAL
CO2 SERPL-SCNC: 22 MMOL/L (ref 23–29)
CO2 SERPL-SCNC: 22 MMOL/L (ref 23–29)
COLOR UR: ABNORMAL
CREAT SERPL-MCNC: 0.8 MG/DL (ref 0.5–1.4)
CREAT SERPL-MCNC: 0.8 MG/DL (ref 0.5–1.4)
CREAT UR-MCNC: 177.2 MG/DL (ref 23–375)
CV ECHO LV RWT: 0.42 CM
DIFFERENTIAL METHOD: ABNORMAL
DOP CALC AO PEAK VEL: 2.37 M/S
DOP CALC AO VTI: 43 CM
DOP CALC LVOT AREA: 3.4 CM2
DOP CALC LVOT DIAMETER: 2.07 CM
DOP CALC LVOT PEAK VEL: 0.68 M/S
DOP CALC LVOT STROKE VOLUME: 47.76 CM3
DOP CALC RVOT PEAK VEL: 0.71 M/S
DOP CALC RVOT VTI: 10.4 CM
DOP CALCLVOT PEAK VEL VTI: 14.2 CM
E WAVE DECELERATION TIME: 147.5 MSEC
E/A RATIO: 3.29
E/E' RATIO: 14.93 M/S
ECHO EF ESTIMATED: 7 %
ECHO LV POSTERIOR WALL: 1.33 CM (ref 0.6–1.1)
EJECTION FRACTION: 15 %
EOSINOPHIL # BLD AUTO: 0.1 K/UL (ref 0–0.5)
EOSINOPHIL NFR BLD: 2.5 % (ref 0–8)
ERYTHROCYTE [DISTWIDTH] IN BLOOD BY AUTOMATED COUNT: 16.4 % (ref 11.5–14.5)
EST. GFR  (AFRICAN AMERICAN): >60 ML/MIN/1.73 M^2
EST. GFR  (AFRICAN AMERICAN): >60 ML/MIN/1.73 M^2
EST. GFR  (NON AFRICAN AMERICAN): >60 ML/MIN/1.73 M^2
EST. GFR  (NON AFRICAN AMERICAN): >60 ML/MIN/1.73 M^2
ETHANOL SERPL-MCNC: <10 MG/DL
FERRITIN SERPL-MCNC: 1157 NG/ML (ref 20–300)
FOLATE SERPL-MCNC: 8.6 NG/ML (ref 4–24)
FRACTIONAL SHORTENING: 3 % (ref 28–44)
GLUCOSE SERPL-MCNC: 110 MG/DL (ref 70–110)
GLUCOSE SERPL-MCNC: 135 MG/DL (ref 70–110)
GLUCOSE UR QL STRIP: NEGATIVE
GRAN CASTS #/AREA URNS LPF: 2 /LPF
HCT VFR BLD AUTO: 25.6 % (ref 40–54)
HCV AB SERPL QL IA: NEGATIVE
HEP C VIRUS HOLD SPECIMEN: NORMAL
HGB BLD-MCNC: 9.5 G/DL (ref 14–18)
HGB UR QL STRIP: NEGATIVE
HYALINE CASTS #/AREA URNS LPF: 1 /LPF
IMM GRANULOCYTES # BLD AUTO: 0.01 K/UL (ref 0–0.04)
IMM GRANULOCYTES NFR BLD AUTO: 0.2 % (ref 0–0.5)
INTERVENTRICULAR SEPTUM: 1.27 CM (ref 0.6–1.1)
IRON SERPL-MCNC: 113 UG/DL (ref 45–160)
IVC DIAMETER: 1.42 CM
IVRT: 62.8 MSEC
KETONES UR QL STRIP: ABNORMAL
LA MAJOR: 6.57 CM
LA MINOR: 4.67 CM
LA WIDTH: 4.67 CM
LEFT ATRIUM SIZE: 4.15 CM
LEFT ATRIUM VOLUME INDEX: 41.6 ML/M2
LEFT ATRIUM VOLUME: 89.94 CM3
LEFT INTERNAL DIMENSION IN SYSTOLE: 6.19 CM (ref 2.1–4)
LEFT VENTRICLE DIASTOLIC VOLUME INDEX: 96.6 ML/M2
LEFT VENTRICLE DIASTOLIC VOLUME: 208.66 ML
LEFT VENTRICLE MASS INDEX: 180 G/M2
LEFT VENTRICLE SYSTOLIC VOLUME INDEX: 89.5 ML/M2
LEFT VENTRICLE SYSTOLIC VOLUME: 193.28 ML
LEFT VENTRICULAR INTERNAL DIMENSION IN DIASTOLE: 6.4 CM (ref 3.5–6)
LEFT VENTRICULAR MASS: 389.02 G
LEUKOCYTE ESTERASE UR QL STRIP: ABNORMAL
LIPASE SERPL-CCNC: 17 U/L (ref 4–60)
LV LATERAL E/E' RATIO: 11.2 M/S
LV SEPTAL E/E' RATIO: 22.4 M/S
LVOT MG: 1.07 MMHG
LVOT MV: 0.49 CM/S
LYMPHOCYTES # BLD AUTO: 1.3 K/UL (ref 1–4.8)
LYMPHOCYTES NFR BLD: 26.9 % (ref 18–48)
MCH RBC QN AUTO: 28.4 PG (ref 27–31)
MCHC RBC AUTO-ENTMCNC: 37.1 G/DL (ref 32–36)
MCV RBC AUTO: 77 FL (ref 82–98)
METHADONE UR QL SCN>300 NG/ML: NEGATIVE
MICROSCOPIC COMMENT: ABNORMAL
MONOCYTES # BLD AUTO: 0.7 K/UL (ref 0.3–1)
MONOCYTES NFR BLD: 14 % (ref 4–15)
MV PEAK A VEL: 0.34 M/S
MV PEAK E VEL: 1.12 M/S
MV STENOSIS PRESSURE HALF TIME: 42.77 MS
MV VALVE AREA P 1/2 METHOD: 5.14 CM2
NEUTROPHILS # BLD AUTO: 2.7 K/UL (ref 1.8–7.7)
NEUTROPHILS NFR BLD: 56 % (ref 38–73)
NITRITE UR QL STRIP: POSITIVE
NRBC BLD-RTO: 0 /100 WBC
OPIATES UR QL SCN: NEGATIVE
PCP UR QL SCN>25 NG/ML: NEGATIVE
PH UR STRIP: 6 [PH] (ref 5–8)
PISA MRMAX VEL: 4.84 M/S
PISA TR MAX VEL: 3.33 M/S
PLATELET # BLD AUTO: 229 K/UL (ref 150–450)
PMV BLD AUTO: 9.3 FL (ref 9.2–12.9)
POTASSIUM SERPL-SCNC: 3.4 MMOL/L (ref 3.5–5.1)
POTASSIUM SERPL-SCNC: 3.6 MMOL/L (ref 3.5–5.1)
PROT SERPL-MCNC: 6.6 G/DL (ref 6–8.4)
PROT SERPL-MCNC: 6.9 G/DL (ref 6–8.4)
PROT UR QL STRIP: ABNORMAL
PV MEAN GRADIENT: 1.29 MMHG
RA MAJOR: 5.42 CM
RA PRESSURE: 3 MMHG
RA WIDTH: 5.42 CM
RBC # BLD AUTO: 3.34 M/UL (ref 4.6–6.2)
RBC #/AREA URNS HPF: 3 /HPF (ref 0–4)
SALICYLATES SERPL-MCNC: <5 MG/DL (ref 15–30)
SARS-COV-2 RDRP RESP QL NAA+PROBE: NEGATIVE
SATURATED IRON: 43 % (ref 20–50)
SINUS: 3.67 CM
SODIUM SERPL-SCNC: 132 MMOL/L (ref 136–145)
SODIUM SERPL-SCNC: 133 MMOL/L (ref 136–145)
SP GR UR STRIP: 1.02 (ref 1–1.03)
STJ: 3.29 CM
TDI LATERAL: 0.1 M/S
TDI SEPTAL: 0.05 M/S
TDI: 0.08 M/S
TOTAL IRON BINDING CAPACITY: 260 UG/DL (ref 250–450)
TOXICOLOGY INFORMATION: NORMAL
TR MAX PG: 44 MMHG
TRANSFERRIN SERPL-MCNC: 176 MG/DL (ref 200–375)
TRANSFERRIN SERPL-MCNC: 176 MG/DL (ref 200–375)
TRICUSPID ANNULAR PLANE SYSTOLIC EXCURSION: 1.81 CM
TROPONIN I SERPL DL<=0.01 NG/ML-MCNC: 0.01 NG/ML (ref 0–0.03)
TV REST PULMONARY ARTERY PRESSURE: 47 MMHG
URN SPEC COLLECT METH UR: ABNORMAL
UROBILINOGEN UR STRIP-ACNC: 1 EU/DL
WBC # BLD AUTO: 4.79 K/UL (ref 3.9–12.7)
WBC #/AREA URNS HPF: 5 /HPF (ref 0–5)

## 2022-04-05 PROCEDURE — U0002 COVID-19 LAB TEST NON-CDC: HCPCS | Performed by: EMERGENCY MEDICINE

## 2022-04-05 PROCEDURE — 82550 ASSAY OF CK (CPK): CPT | Performed by: EMERGENCY MEDICINE

## 2022-04-05 PROCEDURE — 84484 ASSAY OF TROPONIN QUANT: CPT | Performed by: EMERGENCY MEDICINE

## 2022-04-05 PROCEDURE — 83880 ASSAY OF NATRIURETIC PEPTIDE: CPT | Performed by: EMERGENCY MEDICINE

## 2022-04-05 PROCEDURE — 93010 EKG 12-LEAD: ICD-10-PCS | Mod: ,,, | Performed by: INTERNAL MEDICINE

## 2022-04-05 PROCEDURE — 93010 ELECTROCARDIOGRAM REPORT: CPT | Mod: ,,, | Performed by: INTERNAL MEDICINE

## 2022-04-05 PROCEDURE — G0378 HOSPITAL OBSERVATION PER HR: HCPCS

## 2022-04-05 PROCEDURE — 80143 DRUG ASSAY ACETAMINOPHEN: CPT | Performed by: EMERGENCY MEDICINE

## 2022-04-05 PROCEDURE — 85025 COMPLETE CBC W/AUTO DIFF WBC: CPT | Performed by: EMERGENCY MEDICINE

## 2022-04-05 PROCEDURE — 80074 ACUTE HEPATITIS PANEL: CPT | Performed by: EMERGENCY MEDICINE

## 2022-04-05 PROCEDURE — 99215 PR OFFICE/OUTPT VISIT, EST, LEVL V, 40-54 MIN: ICD-10-PCS | Mod: 25,,, | Performed by: INTERNAL MEDICINE

## 2022-04-05 PROCEDURE — 82140 ASSAY OF AMMONIA: CPT | Performed by: EMERGENCY MEDICINE

## 2022-04-05 PROCEDURE — 80053 COMPREHEN METABOLIC PANEL: CPT | Performed by: EMERGENCY MEDICINE

## 2022-04-05 PROCEDURE — 25000003 PHARM REV CODE 250: Performed by: NURSE PRACTITIONER

## 2022-04-05 PROCEDURE — 82746 ASSAY OF FOLIC ACID SERUM: CPT | Performed by: NURSE PRACTITIONER

## 2022-04-05 PROCEDURE — 96366 THER/PROPH/DIAG IV INF ADDON: CPT | Performed by: EMERGENCY MEDICINE

## 2022-04-05 PROCEDURE — 99285 EMERGENCY DEPT VISIT HI MDM: CPT | Mod: 25

## 2022-04-05 PROCEDURE — 80179 DRUG ASSAY SALICYLATE: CPT | Performed by: EMERGENCY MEDICINE

## 2022-04-05 PROCEDURE — 99215 OFFICE O/P EST HI 40 MIN: CPT | Mod: 25,,, | Performed by: INTERNAL MEDICINE

## 2022-04-05 PROCEDURE — 36415 COLL VENOUS BLD VENIPUNCTURE: CPT | Performed by: NURSE PRACTITIONER

## 2022-04-05 PROCEDURE — 99204 PR OFFICE/OUTPT VISIT, NEW, LEVL IV, 45-59 MIN: ICD-10-PCS | Mod: ,,, | Performed by: INTERNAL MEDICINE

## 2022-04-05 PROCEDURE — 83690 ASSAY OF LIPASE: CPT | Performed by: EMERGENCY MEDICINE

## 2022-04-05 PROCEDURE — 84466 ASSAY OF TRANSFERRIN: CPT | Performed by: NURSE PRACTITIONER

## 2022-04-05 PROCEDURE — 82728 ASSAY OF FERRITIN: CPT | Performed by: NURSE PRACTITIONER

## 2022-04-05 PROCEDURE — 96365 THER/PROPH/DIAG IV INF INIT: CPT | Performed by: EMERGENCY MEDICINE

## 2022-04-05 PROCEDURE — 99204 OFFICE O/P NEW MOD 45 MIN: CPT | Mod: ,,, | Performed by: INTERNAL MEDICINE

## 2022-04-05 PROCEDURE — 82077 ASSAY SPEC XCP UR&BREATH IA: CPT | Performed by: EMERGENCY MEDICINE

## 2022-04-05 PROCEDURE — 80307 DRUG TEST PRSMV CHEM ANLYZR: CPT | Performed by: EMERGENCY MEDICINE

## 2022-04-05 PROCEDURE — 81000 URINALYSIS NONAUTO W/SCOPE: CPT | Mod: 59 | Performed by: EMERGENCY MEDICINE

## 2022-04-05 PROCEDURE — 25000003 PHARM REV CODE 250: Performed by: INTERNAL MEDICINE

## 2022-04-05 PROCEDURE — 86803 HEPATITIS C AB TEST: CPT | Performed by: EMERGENCY MEDICINE

## 2022-04-05 PROCEDURE — 93005 ELECTROCARDIOGRAM TRACING: CPT

## 2022-04-05 PROCEDURE — 63600175 PHARM REV CODE 636 W HCPCS: Performed by: INTERNAL MEDICINE

## 2022-04-05 RX ORDER — SODIUM CHLORIDE 9 MG/ML
INJECTION, SOLUTION INTRAVENOUS
Status: DISCONTINUED | OUTPATIENT
Start: 2022-04-05 | End: 2022-04-09 | Stop reason: HOSPADM

## 2022-04-05 RX ORDER — NALOXONE HCL 0.4 MG/ML
0.02 VIAL (ML) INJECTION
Status: DISCONTINUED | OUTPATIENT
Start: 2022-04-05 | End: 2022-04-09 | Stop reason: HOSPADM

## 2022-04-05 RX ORDER — ONDANSETRON 2 MG/ML
4 INJECTION INTRAMUSCULAR; INTRAVENOUS EVERY 8 HOURS PRN
Status: DISCONTINUED | OUTPATIENT
Start: 2022-04-05 | End: 2022-04-09 | Stop reason: HOSPADM

## 2022-04-05 RX ORDER — IPRATROPIUM BROMIDE AND ALBUTEROL SULFATE 2.5; .5 MG/3ML; MG/3ML
3 SOLUTION RESPIRATORY (INHALATION) EVERY 6 HOURS PRN
Status: DISCONTINUED | OUTPATIENT
Start: 2022-04-05 | End: 2022-04-09 | Stop reason: HOSPADM

## 2022-04-05 RX ORDER — PROCHLORPERAZINE EDISYLATE 5 MG/ML
5 INJECTION INTRAMUSCULAR; INTRAVENOUS EVERY 6 HOURS PRN
Status: DISCONTINUED | OUTPATIENT
Start: 2022-04-05 | End: 2022-04-09 | Stop reason: HOSPADM

## 2022-04-05 RX ORDER — SODIUM CHLORIDE 0.9 % (FLUSH) 0.9 %
10 SYRINGE (ML) INJECTION EVERY 8 HOURS PRN
Status: DISCONTINUED | OUTPATIENT
Start: 2022-04-05 | End: 2022-04-09 | Stop reason: HOSPADM

## 2022-04-05 RX ORDER — SIMETHICONE 80 MG
1 TABLET,CHEWABLE ORAL 4 TIMES DAILY PRN
Status: DISCONTINUED | OUTPATIENT
Start: 2022-04-05 | End: 2022-04-09 | Stop reason: HOSPADM

## 2022-04-05 RX ORDER — MAG HYDROX/ALUMINUM HYD/SIMETH 200-200-20
30 SUSPENSION, ORAL (FINAL DOSE FORM) ORAL 4 TIMES DAILY PRN
Status: DISCONTINUED | OUTPATIENT
Start: 2022-04-05 | End: 2022-04-09 | Stop reason: HOSPADM

## 2022-04-05 RX ORDER — TALC
6 POWDER (GRAM) TOPICAL NIGHTLY PRN
Status: DISCONTINUED | OUTPATIENT
Start: 2022-04-05 | End: 2022-04-09 | Stop reason: HOSPADM

## 2022-04-05 RX ORDER — BUMETANIDE 1 MG/1
1 TABLET ORAL DAILY
Status: DISCONTINUED | OUTPATIENT
Start: 2022-04-05 | End: 2022-04-07

## 2022-04-05 RX ORDER — POTASSIUM CHLORIDE 20 MEQ/1
20 TABLET, EXTENDED RELEASE ORAL DAILY
Status: DISCONTINUED | OUTPATIENT
Start: 2022-04-05 | End: 2022-04-09 | Stop reason: HOSPADM

## 2022-04-05 RX ORDER — SPIRONOLACTONE 25 MG/1
25 TABLET ORAL DAILY
Status: DISCONTINUED | OUTPATIENT
Start: 2022-04-05 | End: 2022-04-07

## 2022-04-05 RX ADMIN — SPIRONOLACTONE 25 MG: 25 TABLET, FILM COATED ORAL at 05:04

## 2022-04-05 RX ADMIN — PIPERACILLIN SODIUM AND TAZOBACTAM SODIUM 4.5 G: 4; .5 INJECTION, POWDER, FOR SOLUTION INTRAVENOUS at 05:04

## 2022-04-05 RX ADMIN — SODIUM CHLORIDE: 0.9 INJECTION, SOLUTION INTRAVENOUS at 05:04

## 2022-04-05 RX ADMIN — POTASSIUM CHLORIDE 20 MEQ: 1500 TABLET, EXTENDED RELEASE ORAL at 05:04

## 2022-04-05 RX ADMIN — Medication 6 MG: at 09:04

## 2022-04-05 RX ADMIN — BUMETANIDE 1 MG: 1 TABLET ORAL at 05:04

## 2022-04-05 NOTE — NURSING
Patient admitted from ER.  Oriented to room and call light. Plan of care discussed. No acute distress noted. Will continue current treatment plan.

## 2022-04-05 NOTE — ASSESSMENT & PLAN NOTE
Patient who presents with obstructive jaundice/pancreatic head mass concerning for underlying malignancy  -ERCP warranted with tissue sampling  -Known history of severe MR/severe ICM/CHF, on chronic inotropic therapy  -Echo pending  -Recent LHC 3/22 at [facility] showed patent grafts  -High risk of tyra and post OP CV complications for any procedure given co-morbidities/cardiac status

## 2022-04-05 NOTE — SUBJECTIVE & OBJECTIVE
Past Medical History:   Diagnosis Date    Arthritis     Cardiomyopathy     Ischemic    CHF (congestive heart failure)     Coronary artery disease     Dyslipidemia     Hyperlipidemia     Hypertension     Mitral insufficiency     Myocardial infarction     Peripheral arterial disease     Pneumonia 2021    Pulmonary hypertension        Past Surgical History:   Procedure Laterality Date    CORONARY ARTERY BYPASS GRAFT      Lima to LAD, saphenous vein graft to right PDA    INSERTION OF IMPLANTABLE CARDIOVERTER-DEFIBRILLATOR (ICD) GENERATOR WITH TWO EXISTING LEADS         Review of patient's allergies indicates:  No Known Allergies    No current facility-administered medications on file prior to encounter.     Current Outpatient Medications on File Prior to Encounter   Medication Sig    apixaban (ELIQUIS) 5 mg Tab Take 5 mg by mouth once daily.    aspirin 81 MG Chew Take 81 mg by mouth once daily.    b complex vitamins capsule Take 1 capsule by mouth once daily.    bumetanide (BUMEX) 1 MG tablet Take 1 mg by mouth once daily.    cholecalciferol, vitamin D3, (VITAMIN D3) 25 mcg (1,000 unit) capsule Take 1,000 Units by mouth.    clopidogreL (PLAVIX) 75 mg tablet Take 75 mg by mouth once daily.    ENTRESTO 49-51 mg per tablet Take 49-51 tablets by mouth 2 (two) times a day.    folic acid (FOLVITE) 1 MG tablet Take 1 mg by mouth once daily.    metoprolol succinate (TOPROL-XL) 25 MG 24 hr tablet Take 25 mg by mouth 2 (two) times a day.    omega-3/dha/epa/ala/vitamin D3 (OMEGA-3-DHA-EPA-ALA-VIT D3 ORAL) Take 2 capsules by mouth.    potassium chloride SA (K-DUR,KLOR-CON) 20 MEQ tablet Take 20 mEq by mouth once daily.    spironolactone (ALDACTONE) 25 MG tablet Take 25 mg by mouth once daily.     Family History    None       Tobacco Use    Smoking status: Former Smoker     Types: Cigarettes     Quit date:      Years since quittin.2    Smokeless tobacco: Never Used   Substance and Sexual Activity    Alcohol use: Not  Currently    Drug use: Never    Sexual activity: Not on file     Review of Systems   Constitutional: Positive for decreased appetite, malaise/fatigue and weight loss.   HENT: Negative.     Eyes: Negative.    Cardiovascular: Negative.    Respiratory: Negative.     Endocrine: Negative.    Hematologic/Lymphatic: Negative.    Skin: Negative.    Musculoskeletal: Negative.    Gastrointestinal:  Positive for jaundice.        Pale colored stools     Genitourinary: Negative.    Neurological: Negative.    Psychiatric/Behavioral: Negative.     Allergic/Immunologic: Negative.    Objective:     Vital Signs (Most Recent):  Temp: 98 °F (36.7 °C) (04/05/22 0950)  Pulse: 78 (04/05/22 1202)  Resp: 13 (04/05/22 1202)  BP: 107/63 (04/05/22 1202)  SpO2: 100 % (04/05/22 1202) Vital Signs (24h Range):  Temp:  [98 °F (36.7 °C)] 98 °F (36.7 °C)  Pulse:  [78-92] 78  Resp:  [13-17] 13  SpO2:  [99 %-100 %] 100 %  BP: ()/(55-63) 107/63     Weight: 89.4 kg (197 lb 1.5 oz)  Body mass index is 25.31 kg/m².    SpO2: 100 %  O2 Device (Oxygen Therapy): room air    No intake or output data in the 24 hours ending 04/05/22 1351    Lines/Drains/Airways       Peripherally Inserted Central Catheter Line  Duration             PICC Double Lumen right brachial -- days                    Physical Exam  Vitals and nursing note reviewed.   Constitutional:       General: He is not in acute distress.     Appearance: Normal appearance. He is well-developed. He is not diaphoretic.   HENT:      Head: Normocephalic and atraumatic.   Eyes:      General: Scleral icterus present.         Right eye: No discharge.         Left eye: No discharge.      Pupils: Pupils are equal, round, and reactive to light.   Neck:      Thyroid: No thyromegaly.      Vascular: No JVD.      Trachea: No tracheal deviation.   Cardiovascular:      Rate and Rhythm: Normal rate and regular rhythm.      Heart sounds: S1 normal and S2 normal. Murmur heard.   High-pitched blowing holosystolic  murmur is present at the apex.      Comments: AICD site well-healed  Sternotomy site well-healed  Pulmonary:      Effort: Pulmonary effort is normal. No respiratory distress.      Breath sounds: Normal breath sounds. No wheezing or rales.   Abdominal:      General: There is no distension.      Tenderness: There is no rebound.   Musculoskeletal:      Cervical back: Neck supple.      Right lower leg: No edema.      Left lower leg: No edema.   Skin:     General: Skin is warm and dry.      Coloration: Skin is jaundiced.      Findings: No erythema.   Neurological:      Mental Status: He is alert and oriented to person, place, and time.   Psychiatric:         Mood and Affect: Mood normal.         Behavior: Behavior normal.     Significant Labs: CMP   Recent Labs   Lab 04/04/22  1135 04/05/22  1037   * 132*   K 3.6 3.4*   CL 98 99   CO2 22* 22*    135*   BUN 17 17   CREATININE 0.8 0.8   CALCIUM 9.9 9.6   PROT 6.9 6.6   ALBUMIN 2.5* 2.4*   BILITOT 18.5* 18.7*   ALKPHOS 951* 977*   AST 78* 90*   ALT 57* 62*   ANIONGAP 13 11   ESTGFRAFRICA >60.0 >60   EGFRNONAA >60.0 >60   , CBC   Recent Labs   Lab 04/04/22  1135 04/05/22  1037   WBC 5.20 4.79   HGB 10.4* 9.5*   HCT 29.2* 25.6*    229   , Troponin   Recent Labs   Lab 04/05/22  1037   TROPONINI 0.010   , and All pertinent lab results from the last 24 hours have been reviewed.    Significant Imaging: EKG: Reviewed and X-Ray: CXR: X-Ray Chest 1 View (CXR): No results found for this visit on 04/05/22. and X-Ray Chest PA and Lateral (CXR): No results found for this visit on 04/05/22.

## 2022-04-05 NOTE — ASSESSMENT & PLAN NOTE
-Patient who presents with obstructive jaundice/pancreatic head mass concerning for underlying malignancy  -ERCP warranted with tissue sampling  -Known history of severe MR/severe ICM/CHF, on chronic inotropic therapy  -Echo pending  -Recent LHC 3/22 at [facility] showed patent grafts  -High risk of tyra and post OP CV complications for any procedure given co-morbidities/cardiac status

## 2022-04-05 NOTE — H&P
On license of [facility] Medicine  History & Physical    Patient Name: Avery Earl  MRN: [id number]  Patient Class: OP- Observation  Admission Date: 4/5/2022  Attending Physician: Jose Rafael Dyson MD   Primary Care Provider: Keven Cuadra DO         Patient information was obtained from patient, past medical records and ER records.     Subjective:     Principal Problem:Pancreatic mass    Chief Complaint:   Chief Complaint   Patient presents with    Jaundice     Pt seen at [place] yesterday. Referred to ED by cardiologist for jaundice and dark urine. Pt offers no CC        HPI: Pt is a 67 yo male with pMHx of CABG, ischemic cardiomyopathy on continuous dobutamine infusion at home, DVT, PAD, HPL, severe mitral insuffiencey and pulmonary hypertension. He presents today due to dark, tea colored urine onset approx 2 weeks and scleral jaundice for approx 1.5 weeks. He endorses intermittent generalized abdominal pain and back pain. Within the last 3 months, pt has lost approx 30 pounds as he had all his teeth extracted due to possible cardiac procedure. He describes KIMBALL and bilateral lower leg swelling.   In the ED, temp 98, pulse 92, resp 14, B/P 86/55 and SpO2 100 %  Labs find H/H 9.5/25.6, Na 132, potassium 3.4, gluc 135, alk phos 977, total bilirubin 18.7 and AST 90 and ALT 62  Urine is tea colored and analysis reflects multiple abnormalities.  CXR - no acute cardiopulmonary findings  Abdominal US - 3.6 cm solid mass seen at the level the head of the pancreas with significant ductal dilatation of the pancreatic duct measuring up to 11 mm.   Moderate intrahepatic and common bile duct dilatation. 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  Recommend ERCP with endoscopic ultrasound and tissue sampling.  ED spoke with Dr. Miller - hold further anticoagulation - full consult pending by GI.   Cardiology to provide opinion regarding cardiac clearance  given ischemic cardiomyopathy prior to ERCP.      Past Medical History:   Diagnosis Date    Arthritis     Cardiomyopathy     Ischemic    CHF (congestive heart failure)     Coronary artery disease     Dyslipidemia     Hyperlipidemia     Hypertension     Mitral insufficiency     Myocardial infarction     Peripheral arterial disease     Pneumonia 12/2021    Pulmonary hypertension        Past Surgical History:   Procedure Laterality Date    CORONARY ARTERY BYPASS GRAFT      [place] to LAD, saphenous vein graft to right PDA    INSERTION OF IMPLANTABLE CARDIOVERTER-DEFIBRILLATOR (ICD) GENERATOR WITH TWO EXISTING LEADS         Review of patient's allergies indicates:  No Known Allergies    No current facility-administered medications on file prior to encounter.     Current Outpatient Medications on File Prior to Encounter   Medication Sig    apixaban (ELIQUIS) 5 mg Tab Take 5 mg by mouth once daily.    aspirin 81 MG Chew Take 81 mg by mouth once daily.    b complex vitamins capsule Take 1 capsule by mouth once daily.    bumetanide (BUMEX) 1 MG tablet Take 1 mg by mouth once daily.    cholecalciferol, vitamin D3, (VITAMIN D3) 25 mcg (1,000 unit) capsule Take 1,000 Units by mouth once daily.    ENTRESTO 49-51 mg per tablet Take 49-51 tablets by mouth 2 (two) times a day.    omega-3/dha/epa/ala/vitamin D3 (OMEGA-3-DHA-EPA-ALA-VIT D3 ORAL) Take 2 capsules by mouth once daily at 6am.    potassium chloride SA (K-DUR,KLOR-CON) 20 MEQ tablet Take 20 mEq by mouth once daily.    spironolactone (ALDACTONE) 25 MG tablet Take 25 mg by mouth once daily.    [DISCONTINUED] clopidogreL (PLAVIX) 75 mg tablet Take 75 mg by mouth once daily.    [DISCONTINUED] folic acid (FOLVITE) 1 MG tablet Take 1 mg by mouth once daily.    [DISCONTINUED] metoprolol succinate (TOPROL-XL) 25 MG 24 hr tablet Take 25 mg by mouth 2 (two) times a day.     Family History    Reviewed and not pertinent       Tobacco Use    Smoking status:  Former Smoker     Types: Cigarettes     Quit date:      Years since quittin.2    Smokeless tobacco: Never Used   Substance and Sexual Activity    Alcohol use: Not Currently    Drug use: Never    Sexual activity: Not on file     Review of Systems   Constitutional:  Positive for unexpected weight change. Negative for chills, fatigue and fever.   HENT:  Positive for dental problem.    Eyes:  Negative for visual disturbance.   Respiratory:  Positive for shortness of breath. Negative for cough.    Cardiovascular:  Positive for leg swelling. Negative for chest pain and palpitations.   Gastrointestinal:  Positive for abdominal pain. Negative for diarrhea, nausea and vomiting.   Genitourinary:         Tea colored urine   Musculoskeletal:  Positive for back pain.   Skin:  Positive for rash.   Neurological:  Negative for dizziness, weakness and headaches.   Psychiatric/Behavioral: Negative.     Objective:     Vital Signs (Most Recent):  Temp: 98 °F (36.7 °C) (22 0950)  Pulse: 78 (22 1202)  Resp: 13 (22 1202)  BP: 107/63 (22 1202)  SpO2: 100 % (22 1202) Vital Signs (24h Range):  Temp:  [98 °F (36.7 °C)] 98 °F (36.7 °C)  Pulse:  [78-92] 78  Resp:  [13-17] 13  SpO2:  [99 %-100 %] 100 %  BP: ()/(55-63) 107/63     Weight: 89.4 kg (197 lb)  Body mass index is 25.29 kg/m².    Physical Exam  Vitals and nursing note reviewed.   Constitutional:       Appearance: He is well-developed. He is ill-appearing.   HENT:      Head: Normocephalic and atraumatic.      Nose: Nose normal.   Eyes:      General: Scleral icterus present.      Pupils: Pupils are equal, round, and reactive to light.   Cardiovascular:      Rate and Rhythm: Normal rate and regular rhythm.      Heart sounds: Murmur heard.     No friction rub. No gallop.   Pulmonary:      Effort: Pulmonary effort is normal.      Breath sounds: Normal breath sounds.   Abdominal:      General: Bowel sounds are normal. There is no distension.       Palpations: Abdomen is soft.      Tenderness: There is no abdominal tenderness. There is no guarding.   Musculoskeletal:         General: No tenderness. Normal range of motion.      Cervical back: Normal range of motion and neck supple.      Right lower leg: Edema present.      Left lower leg: Edema present.   Skin:     General: Skin is warm and dry.      Coloration: Skin is jaundiced.   Neurological:      Mental Status: He is alert and oriented to person, place, and time.   Psychiatric:         Behavior: Behavior normal.         CRANIAL NERVES     CN III, IV, VI   Pupils are equal, round, and reactive to light.     Significant Labs: All pertinent labs within the past 24 hours have been reviewed.  CBC:   Recent Labs   Lab 04/04/22  1135 04/05/22  1037   WBC 5.20 4.79   HGB 10.4* 9.5*   HCT 29.2* 25.6*    229     CMP:   Recent Labs   Lab 04/04/22  1135 04/05/22  1037   * 132*   K 3.6 3.4*   CL 98 99   CO2 22* 22*    135*   BUN 17 17   CREATININE 0.8 0.8   CALCIUM 9.9 9.6   PROT 6.9 6.6   ALBUMIN 2.5* 2.4*   BILITOT 18.5* 18.7*   ALKPHOS 951* 977*   AST 78* 90*   ALT 57* 62*   ANIONGAP 13 11   EGFRNONAA >60.0 >60       Significant Imaging: I have reviewed all pertinent imaging results/findings within the past 24 hours.    Assessment/Plan:     Pancreatic mass  ERCP warranted for biopsy - GI consult pending  Hold Eliquis x 2 days as stated by Dr. Chauhan for patient to eat      Microcytic anemia  Monitor CBC  Iron panel pending      On continuous oral anticoagulation  According to Cardiology - isaac to hold oral anticoagulation for planned procedure and resume post operatively ASAP  According to Dr. Miller  - Eliquis will need to be held for 2 days      Pre-op evaluation    Patient who presents with obstructive jaundice/pancreatic head mass concerning for underlying malignancy  -ERCP warranted with tissue sampling  -Known history of severe MR/severe ICM/CHF, on chronic inotropic  therapy  -Echo pending  -Recent C 3/22 at [facility] showed patent grafts  -High risk of tyra and post OP CV complications for any procedure given co-morbidities/cardiac status    Coronary artery disease involving native coronary artery of native heart without angina pectoris  Hold ASA  No STATIN due to elevated liver enzymes      Chronic combined systolic and diastolic congestive heart failure  Continue Entresto  Patient is identified as having Combined Systolic and Diastolic heart failure that is Chronic. CHF is currently controlled. Latest ECHO performed and demonstrates- Results for orders placed during the hospital encounter of 04/05/22    Echo    Interpretation Summary  · The left ventricle is moderately enlarged with severely decreased systolic function.  · The estimated ejection fraction is 15%.  · Grade III left ventricular diastolic dysfunction.  · Normal right ventricular size with moderately reduced right ventricular systolic function.  · There is mild aortic valve stenosis.  · Mild to moderate tricuspid regurgitation.  · Moderate mitral regurgitation.  · Moderate left atrial enlargement.  · The estimated PA systolic pressure is 47 mmHg.  · There is pulmonary hypertension.  . Continue Bumex and aldactone and monitor clinical status closely. Monitor on telemetry. Patient is off CHF pathway.  Monitor strict Is&Os and daily weights.  Place on fluid restriction of 1.5 L. Continue to stress to patient importance of self efficacy and  on diet for CHF. Last BNP reviewed- and noted below   Recent Labs   Lab 04/05/22  1037   *   .        VTE Risk Mitigation (From admission, onward)         Ordered     IP VTE HIGH RISK PATIENT  Once         04/05/22 1452     Place sequential compression device  Until discontinued         04/05/22 1452     Reason for No Pharmacological VTE Prophylaxis  Once        Question:  Reasons:  Answer:  Physician Provided (leave comment)    04/05/22 1452                   Maida  LEEANN Jamil NP  Department of Hospital Medicine   '[place] - Telemetry ([facility])

## 2022-04-05 NOTE — ASSESSMENT & PLAN NOTE
-Stable, compensated, on chronic dobutamine infusion  -Continue Bumex, Entresto, Aldactone as tolerated  -No BB given inotropic support

## 2022-04-05 NOTE — HPI
"Mr. Earl is a 66 year old male patient whose current medical conditions include chronic systolic CHF (on chronic dobutamine infusion/inotropic therapy), CAD s/p CABG x 2, s/p AICD, PAD, and severe MR who was sent to [facility] ED from cardiology clinic due to jaundice that onset two weeks ago. Patient denied any associated fever, chills, abdominal pain, SOB, or chest pain. 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 Cardiology consulted for pre-op risk assessment. Patient seen and examined today, resting in bed. Feels weak. Reports decreased appetite and weight loss recently along with "pale stools". Chronic KIMBALL, but does not seem to be worse than his norm. No chest pain/angina. He reports compliance with his medications, on Eliquis as OP for unclear reasons (patient states he believes it is due to prior DVT). Recently had LHC at [facility] which showed patent grafts (LIMA to LAD, SVG to RCA) in 3/22./Previously deemed not to be a suitable for candidate for MitralClip or advanced options (LVAD). He has been evaluated by several cardiologists, Dr. Funes, Dr. Barnhart, and Dr. Simpson, most recently was seen by Dr. Infante. Echo pending.  "

## 2022-04-05 NOTE — ASSESSMENT & PLAN NOTE
According to Cardiology - isaac to hold oral anticoagulation for planned procedure and resume post operatively ASAP  According to Dr. Martinez will need to be held for 2 days

## 2022-04-05 NOTE — HPI
Pt is a 65 yo male with pMHx of CABG, ischemic cardiomyopathy on continuous dobutamine infusion at home, DVT, PAD, HPL, severe mitral insuffiencey and pulmonary hypertension. He presents today due to dark, tea colored urine onset approx 2 weeks and scleral jaundice for approx 1.5 weeks. He endorses intermittent generalized abdominal pain and back pain. Within the last 3 months, pt has lost approx 30 pounds as he had all his teeth extracted due to possible cardiac procedure. He describes KIMBALL and bilateral lower leg swelling.   In the ED, temp 98, pulse 92, resp 14, B/P 86/55 and SpO2 100 %  Labs find H/H 9.5/25.6, Na 132, potassium 3.4, gluc 135, alk phos 977, total bilirubin 18.7 and AST 90 and ALT 62  Urine is tea colored and analysis reflects multiple abnormalities.  CXR - no acute cardiopulmonary findings  Abdominal US - 3.6 cm solid mass seen at the level the head of the pancreas with significant ductal dilatation of the pancreatic duct measuring up to 11 mm.   Moderate intrahepatic and common bile duct dilatation. 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  Recommend ERCP with endoscopic ultrasound and tissue sampling.  ED spoke with Dr. Miller - hold further anticoagulation - full consult pending by GI.   Cardiology to provide opinion regarding cardiac clearance given ischemic cardiomyopathy prior to ERCP.

## 2022-04-05 NOTE — CONSULTS
"[facility] - Emergency Dept.  Cardiology  Consult Note    Patient Name: Avery Earl  MRN: [id number]  Admission Date: 4/5/2022  Hospital Length of Stay: 0 days  Code Status: No Order   Attending Provider: Edson Santa MD   Consulting Provider: Angelita Cantrell PA-C  Primary Care Physician: Keven Cuadra DO  Principal Problem:<principal problem not specified>    Patient information was obtained from patient, past medical records and ER records.     Inpatient consult to Cardiology  Consult performed by: Angelita Cantrell PA-C  Consult ordered by: Maida Jamil NP        Subjective:     Chief Complaint:  Jaundice    HPI:   Mr. Earl is a 66 year old male patient whose current medical conditions include chronic systolic CHF (on chronic dobutamine infusion/inotropic therapy), CAD s/p CABG x 2, s/p AICD, PAD, and severe MR who was sent to [facility] ED from cardiology clinic due to jaundice that onset two weeks ago. Patient denied any associated fever, chills, abdominal pain, SOB, or chest pain. 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 Cardiology consulted for pre-op risk assessment. Patient seen and examined today, resting in bed. Feels weak. Reports decreased appetite and weight loss recently along with "pale stools". Chronic KIMBALL, but does not seem to be worse than his norm. No chest pain/angina. He reports compliance with his medications, on Eliquis as OP for unclear reasons (patient states he believes it is due to prior DVT). Recently had LHC at [facility] which showed patent grafts (LIMA to LAD, SVG to RCA) in 3/22./Previously deemed not to be a suitable for candidate for MitralClip or advanced options (LVAD). He has been evaluated by " several cardiologists, Dr. Funes, Dr. Barnhart, and Dr. Simpson, most recently was seen by Dr. Infante. Echo pending.      Past Medical History:   Diagnosis Date    Arthritis     Cardiomyopathy     Ischemic    CHF (congestive heart failure)     Coronary artery disease     Dyslipidemia     Hyperlipidemia     Hypertension     Mitral insufficiency     Myocardial infarction     Peripheral arterial disease     Pneumonia 12/2021    Pulmonary hypertension        Past Surgical History:   Procedure Laterality Date    CORONARY ARTERY BYPASS GRAFT      [place] to LAD, saphenous vein graft to right PDA    INSERTION OF IMPLANTABLE CARDIOVERTER-DEFIBRILLATOR (ICD) GENERATOR WITH TWO EXISTING LEADS         Review of patient's allergies indicates:  No Known Allergies    No current facility-administered medications on file prior to encounter.     Current Outpatient Medications on File Prior to Encounter   Medication Sig    apixaban (ELIQUIS) 5 mg Tab Take 5 mg by mouth once daily.    aspirin 81 MG Chew Take 81 mg by mouth once daily.    b complex vitamins capsule Take 1 capsule by mouth once daily.    bumetanide (BUMEX) 1 MG tablet Take 1 mg by mouth once daily.    cholecalciferol, vitamin D3, (VITAMIN D3) 25 mcg (1,000 unit) capsule Take 1,000 Units by mouth.    clopidogreL (PLAVIX) 75 mg tablet Take 75 mg by mouth once daily.    ENTRESTO 49-51 mg per tablet Take 49-51 tablets by mouth 2 (two) times a day.    folic acid (FOLVITE) 1 MG tablet Take 1 mg by mouth once daily.    metoprolol succinate (TOPROL-XL) 25 MG 24 hr tablet Take 25 mg by mouth 2 (two) times a day.    omega-3/dha/epa/ala/vitamin D3 (OMEGA-3-DHA-EPA-ALA-VIT D3 ORAL) Take 2 capsules by mouth.    potassium chloride SA (K-DUR,KLOR-CON) 20 MEQ tablet Take 20 mEq by mouth once daily.    spironolactone (ALDACTONE) 25 MG tablet Take 25 mg by mouth once daily.     Family History    None       Tobacco Use    Smoking status: Former Smoker     Types:  Cigarettes     Quit date:      Years since quittin.2    Smokeless tobacco: Never Used   Substance and Sexual Activity    Alcohol use: Not Currently    Drug use: Never    Sexual activity: Not on file     Review of Systems   Constitutional: Positive for decreased appetite, malaise/fatigue and weight loss.   HENT: Negative.     Eyes: Negative.    Cardiovascular: Negative.    Respiratory: Negative.     Endocrine: Negative.    Hematologic/Lymphatic: Negative.    Skin: Negative.    Musculoskeletal: Negative.    Gastrointestinal:  Positive for jaundice.        Pale colored stools     Genitourinary: Negative.    Neurological: Negative.    Psychiatric/Behavioral: Negative.     Allergic/Immunologic: Negative.    Objective:     Vital Signs (Most Recent):  Temp: 98 °F (36.7 °C) (22 0950)  Pulse: 78 (22 1202)  Resp: 13 (22 1202)  BP: 107/63 (22 1202)  SpO2: 100 % (22 1202) Vital Signs (24h Range):  Temp:  [98 °F (36.7 °C)] 98 °F (36.7 °C)  Pulse:  [78-92] 78  Resp:  [13-17] 13  SpO2:  [99 %-100 %] 100 %  BP: ()/(55-63) 107/63     Weight: 89.4 kg (197 lb 1.5 oz)  Body mass index is 25.31 kg/m².    SpO2: 100 %  O2 Device (Oxygen Therapy): room air    No intake or output data in the 24 hours ending 22 1351    Lines/Drains/Airways       Peripherally Inserted Central Catheter Line  Duration             PICC Double Lumen right brachial -- days                    Physical Exam  Vitals and nursing note reviewed.   Constitutional:       General: He is not in acute distress.     Appearance: Normal appearance. He is well-developed. He is not diaphoretic.   HENT:      Head: Normocephalic and atraumatic.   Eyes:      General: Scleral icterus present.         Right eye: No discharge.         Left eye: No discharge.      Pupils: Pupils are equal, round, and reactive to light.   Neck:      Thyroid: No thyromegaly.      Vascular: No JVD.      Trachea: No tracheal deviation.   Cardiovascular:       Rate and Rhythm: Normal rate and regular rhythm.      Heart sounds: S1 normal and S2 normal. Murmur heard.   High-pitched blowing holosystolic murmur is present at the apex.      Comments: AICD site well-healed  Sternotomy site well-healed  Pulmonary:      Effort: Pulmonary effort is normal. No respiratory distress.      Breath sounds: Normal breath sounds. No wheezing or rales.   Abdominal:      General: There is no distension.      Tenderness: There is no rebound.   Musculoskeletal:      Cervical back: Neck supple.      Right lower leg: No edema.      Left lower leg: No edema.   Skin:     General: Skin is warm and dry.      Coloration: Skin is jaundiced.      Findings: No erythema.   Neurological:      Mental Status: He is alert and oriented to person, place, and time.   Psychiatric:         Mood and Affect: Mood normal.         Behavior: Behavior normal.     Significant Labs: CMP   Recent Labs   Lab 04/04/22  1135 04/05/22  1037   * 132*   K 3.6 3.4*   CL 98 99   CO2 22* 22*    135*   BUN 17 17   CREATININE 0.8 0.8   CALCIUM 9.9 9.6   PROT 6.9 6.6   ALBUMIN 2.5* 2.4*   BILITOT 18.5* 18.7*   ALKPHOS 951* 977*   AST 78* 90*   ALT 57* 62*   ANIONGAP 13 11   ESTGFRAFRICA >60.0 >60   EGFRNONAA >60.0 >60   , CBC   Recent Labs   Lab 04/04/22  1135 04/05/22  1037   WBC 5.20 4.79   HGB 10.4* 9.5*   HCT 29.2* 25.6*    229   , Troponin   Recent Labs   Lab 04/05/22  1037   TROPONINI 0.010   , and All pertinent lab results from the last 24 hours have been reviewed.    Significant Imaging: EKG: Reviewed and X-Ray: CXR: X-Ray Chest 1 View (CXR): No results found for this visit on 04/05/22. and X-Ray Chest PA and Lateral (CXR): No results found for this visit on 04/05/22.    Assessment and Plan:   Patient with significant cardiac history/severe CHF on chronic inotropic therapy who presents with jaundice/pancreatic mass suspicious for underlying malignancy. ERCP warranted. Patient high risk of tyra and  post OP CV complications. Ok to hold AC and resume post-procedure ASAP.     On continuous oral anticoagulation  -Patient on Eliquis as OP for possible DVT  -Ok to hold for planned procedure and resume post-operatively ASAP    Pre-op evaluation  -Patient who presents with obstructive jaundice/pancreatic head mass concerning for underlying malignancy  -ERCP warranted with tissue sampling  -Known history of severe MR/severe ICM/CHF, on chronic inotropic therapy  -Echo pending  -Recent LHC 3/22 at [facility] showed patent grafts  -High risk of tyra and post OP CV complications for any procedure given co-morbidities/cardiac status    Ischemic cardiomyopathy  -Stable, compensated, on chronic dobutamine infusion  -Continue Bumex, Entresto, Aldactone as tolerated  -No BB given inotropic support    Coronary artery disease involving native coronary artery of native heart without angina pectoris  -No angina  -s/p recent LHC in 3/22 at [facility] which showed patent grafts  -Hold ASA/Plavix given impending procedure, resume post-op ASAP  -No statin given jaundice    Nonrheumatic mitral valve regurgitation  -Known severe MR, previously turned down for Mitral clip  -Repeat echo pending    Chronic combined systolic and diastolic congestive heart failure  -Compensated  -Echo pending  -Continue Buemex, Aldactone, Entresto as tolerated  -No BB given inotropic support        VTE Risk Mitigation (From admission, onward)    None          Thank you for your consult. I will follow-up with patient. Please contact us if you have any additional questions.    Angelita Cantrell PA-C  Cardiology   [facility] - Emergency Dept.

## 2022-04-05 NOTE — PLAN OF CARE
Patient resting in  bed with family at bedside. Dobutamine 3.4 ml/hr continues to room on home pump. Denies any distress or pain. Plan of care discussed with patient. Verbalized understanding. Remains free of falls. Will continue current treatment plan

## 2022-04-05 NOTE — ASSESSMENT & PLAN NOTE
Continue Entresto  Patient is identified as having Combined Systolic and Diastolic heart failure that is Chronic. CHF is currently controlled. Latest ECHO performed and demonstrates- Results for orders placed during the hospital encounter of 04/05/22    Echo    Interpretation Summary  · The left ventricle is moderately enlarged with severely decreased systolic function.  · The estimated ejection fraction is 15%.  · Grade III left ventricular diastolic dysfunction.  · Normal right ventricular size with moderately reduced right ventricular systolic function.  · There is mild aortic valve stenosis.  · Mild to moderate tricuspid regurgitation.  · Moderate mitral regurgitation.  · Moderate left atrial enlargement.  · The estimated PA systolic pressure is 47 mmHg.  · There is pulmonary hypertension.  . Continue Bumex and aldactone and monitor clinical status closely. Monitor on telemetry. Patient is off CHF pathway.  Monitor strict Is&Os and daily weights.  Place on fluid restriction of 1.5 L. Continue to stress to patient importance of self efficacy and  on diet for CHF. Last BNP reviewed- and noted below   Recent Labs   Lab 04/05/22  1037   *   .

## 2022-04-05 NOTE — ASSESSMENT & PLAN NOTE
-Compensated  -Echo pending  -Continue Buemex, Aldactone, Entresto as tolerated  -No BB given inotropic support

## 2022-04-05 NOTE — SUBJECTIVE & OBJECTIVE
Past Medical History:   Diagnosis Date    Arthritis     Cardiomyopathy     Ischemic    CHF (congestive heart failure)     Coronary artery disease     Dyslipidemia     Hyperlipidemia     Hypertension     Mitral insufficiency     Myocardial infarction     Peripheral arterial disease     Pneumonia 2021    Pulmonary hypertension        Past Surgical History:   Procedure Laterality Date    CORONARY ARTERY BYPASS GRAFT      [place] to LAD, saphenous vein graft to right PDA    INSERTION OF IMPLANTABLE CARDIOVERTER-DEFIBRILLATOR (ICD) GENERATOR WITH TWO EXISTING LEADS         Review of patient's allergies indicates:  No Known Allergies    No current facility-administered medications on file prior to encounter.     Current Outpatient Medications on File Prior to Encounter   Medication Sig    apixaban (ELIQUIS) 5 mg Tab Take 5 mg by mouth once daily.    aspirin 81 MG Chew Take 81 mg by mouth once daily.    b complex vitamins capsule Take 1 capsule by mouth once daily.    bumetanide (BUMEX) 1 MG tablet Take 1 mg by mouth once daily.    cholecalciferol, vitamin D3, (VITAMIN D3) 25 mcg (1,000 unit) capsule Take 1,000 Units by mouth once daily.    ENTRESTO 49-51 mg per tablet Take 49-51 tablets by mouth 2 (two) times a day.    omega-3/dha/epa/ala/vitamin D3 (OMEGA-3-DHA-EPA-ALA-VIT D3 ORAL) Take 2 capsules by mouth once daily at 6am.    potassium chloride SA (K-DUR,KLOR-CON) 20 MEQ tablet Take 20 mEq by mouth once daily.    spironolactone (ALDACTONE) 25 MG tablet Take 25 mg by mouth once daily.    [DISCONTINUED] clopidogreL (PLAVIX) 75 mg tablet Take 75 mg by mouth once daily.    [DISCONTINUED] folic acid (FOLVITE) 1 MG tablet Take 1 mg by mouth once daily.    [DISCONTINUED] metoprolol succinate (TOPROL-XL) 25 MG 24 hr tablet Take 25 mg by mouth 2 (two) times a day.     Family History    None       Tobacco Use    Smoking status: Former Smoker     Types: Cigarettes     Quit date:      Years since quittin.2    Smokeless  tobacco: Never Used   Substance and Sexual Activity    Alcohol use: Not Currently    Drug use: Never    Sexual activity: Not on file     Review of Systems   Constitutional:  Positive for unexpected weight change. Negative for chills, fatigue and fever.   HENT:  Positive for dental problem.    Eyes:  Negative for visual disturbance.   Respiratory:  Positive for shortness of breath. Negative for cough.    Cardiovascular:  Positive for leg swelling. Negative for chest pain and palpitations.   Gastrointestinal:  Positive for abdominal pain. Negative for diarrhea, nausea and vomiting.   Genitourinary:         Tea colored urine   Musculoskeletal:  Positive for back pain.   Skin:  Positive for rash.   Neurological:  Negative for dizziness, weakness and headaches.   Psychiatric/Behavioral: Negative.     Objective:     Vital Signs (Most Recent):  Temp: 98 °F (36.7 °C) (04/05/22 0950)  Pulse: 78 (04/05/22 1202)  Resp: 13 (04/05/22 1202)  BP: 107/63 (04/05/22 1202)  SpO2: 100 % (04/05/22 1202) Vital Signs (24h Range):  Temp:  [98 °F (36.7 °C)] 98 °F (36.7 °C)  Pulse:  [78-92] 78  Resp:  [13-17] 13  SpO2:  [99 %-100 %] 100 %  BP: ()/(55-63) 107/63     Weight: 89.4 kg (197 lb)  Body mass index is 25.29 kg/m².    Physical Exam  Vitals and nursing note reviewed.   Constitutional:       Appearance: He is well-developed. He is ill-appearing.   HENT:      Head: Normocephalic and atraumatic.      Nose: Nose normal.   Eyes:      General: Scleral icterus present.      Pupils: Pupils are equal, round, and reactive to light.   Cardiovascular:      Rate and Rhythm: Normal rate and regular rhythm.      Heart sounds: Murmur heard.     No friction rub. No gallop.   Pulmonary:      Effort: Pulmonary effort is normal.      Breath sounds: Normal breath sounds.   Abdominal:      General: Bowel sounds are normal. There is no distension.      Palpations: Abdomen is soft.      Tenderness: There is no abdominal tenderness. There is no guarding.    Musculoskeletal:         General: No tenderness. Normal range of motion.      Cervical back: Normal range of motion and neck supple.      Right lower leg: Edema present.      Left lower leg: Edema present.   Skin:     General: Skin is warm and dry.      Coloration: Skin is jaundiced.   Neurological:      Mental Status: He is alert and oriented to person, place, and time.   Psychiatric:         Behavior: Behavior normal.         CRANIAL NERVES     CN III, IV, VI   Pupils are equal, round, and reactive to light.     Significant Labs: All pertinent labs within the past 24 hours have been reviewed.  CBC:   Recent Labs   Lab 04/04/22  1135 04/05/22  1037   WBC 5.20 4.79   HGB 10.4* 9.5*   HCT 29.2* 25.6*    229     CMP:   Recent Labs   Lab 04/04/22  1135 04/05/22  1037   * 132*   K 3.6 3.4*   CL 98 99   CO2 22* 22*    135*   BUN 17 17   CREATININE 0.8 0.8   CALCIUM 9.9 9.6   PROT 6.9 6.6   ALBUMIN 2.5* 2.4*   BILITOT 18.5* 18.7*   ALKPHOS 951* 977*   AST 78* 90*   ALT 57* 62*   ANIONGAP 13 11   EGFRNONAA >60.0 >60       Significant Imaging: I have reviewed all pertinent imaging results/findings within the past 24 hours.

## 2022-04-05 NOTE — ASSESSMENT & PLAN NOTE
Echo    Interpretation Summary  · The left ventricle is moderately enlarged with severely decreased systolic function.  · The estimated ejection fraction is 15%.  · Grade III left ventricular diastolic dysfunction.  · Normal right ventricular size with moderately reduced right ventricular systolic function.  · There is mild aortic valve stenosis.  · Mild to moderate tricuspid regurgitation.  · Moderate mitral regurgitation.  · Moderate left atrial enlargement.  · The estimated PA systolic pressure is 47 mmHg.  · There is pulmonary hypertension.  .   Recent Labs   Lab 04/05/22  1037   *   .

## 2022-04-05 NOTE — H&P (VIEW-ONLY)
[facility] - Telemetry ([facility])  Gastroenterology  Consult Note    Patient Name: Avery Earl  MRN: [id number]  Admission Date: 4/5/2022  Hospital Length of Stay: 0 days  Code Status: Full Code   Attending Provider: Jose Rafael Dyson MD   Consulting Provider: Felisa Quintero MD  Primary Care Physician: Keven Cuadra DO  Principal Problem:Pancreatic mass    Inpatient consult to Gastroenterology  Consult performed by: Felisa Quintero MD  Consult ordered by: Edson Santa MD  Reason for consult: pancreatic mass        Subjective:     HPI:  66 y.o male with chronic systolic CHF (on chronic dobutamine infusion/inotropic therapy), CAD s/p CABG x 2, s/p AICD, PAD and severe MR was referred to ED today for painless jaundice present for 2 weeks. In ED, labs revealed abnormal LFTs including AST 90, ALT 62 and total bilirubin of 18.  US abdomen performed today shows 3.6 cm solid mass seen at the level the head of the pancreas with significant ductal dilatation of the pancreatic duct measuring up to 11 mm.   Moderate intrahepatic and extrheptic dilatation also seen. GI has been asked to evaluate patient and aid in diagnosis.    Patient last took Eliquis this morning. He is seen in his room. Denies abdominal pain. No previous imaging at [facility]. Outside records shows he had a CT scan abd/pelvis in 2017. Pancreas was unremarkable. Colonoscopy in 2013 and EGD in 2015 however unable to view records. No family history of pancreatic cancer. No history of chronic pancreatitis.       Past Medical History:   Diagnosis Date    Arthritis     Cardiomyopathy     Ischemic    CHF (congestive heart failure)     Coronary artery disease     Dyslipidemia     Hyperlipidemia     Hypertension     Mitral insufficiency     Myocardial infarction     Peripheral arterial disease     Pneumonia 12/2021    Pulmonary hypertension        Past Surgical History:   Procedure Laterality Date    CORONARY ARTERY BYPASS GRAFT      [place] to LAD,  saphenous vein graft to right PDA    INSERTION OF IMPLANTABLE CARDIOVERTER-DEFIBRILLATOR (ICD) GENERATOR WITH TWO EXISTING LEADS         Review of patient's allergies indicates:  No Known Allergies  Family History    None       Tobacco Use    Smoking status: Former Smoker     Types: Cigarettes     Quit date:      Years since quittin.2    Smokeless tobacco: Never Used   Substance and Sexual Activity    Alcohol use: Not Currently    Drug use: Never    Sexual activity: Not on file     Review of Systems   Constitutional:  Positive for fatigue and unexpected weight change.   Skin:  Positive for color change.   Neurological:  Positive for weakness.   Objective:     Vital Signs (Most Recent):  Temp: 97.9 °F (36.6 °C) (22 1454)  Pulse: 87 (22 1454)  Resp: 18 (22 1454)  BP: 109/72 (22 1454)  SpO2: 99 % (22 1454) Vital Signs (24h Range):  Temp:  [97.9 °F (36.6 °C)-98 °F (36.7 °C)] 97.9 °F (36.6 °C)  Pulse:  [78-92] 87  Resp:  [13-18] 18  SpO2:  [99 %-100 %] 99 %  BP: ()/(55-72) 109/72     Weight: 89.4 kg (197 lb) (22 1202)  Body mass index is 25.29 kg/m².    No intake or output data in the 24 hours ending 22 1730    Lines/Drains/Airways       Peripherally Inserted Central Catheter Line  Duration             PICC Double Lumen right brachial -- days                    Physical Exam  Vitals and nursing note reviewed.   Constitutional:       Appearance: He is ill-appearing.   Eyes:      General: Scleral icterus present.   Cardiovascular:      Rate and Rhythm: Normal rate and regular rhythm.      Heart sounds: Heart sounds are distant. Murmur heard.   Systolic murmur is present with a grade of 3/6.   Abdominal:      General: Bowel sounds are normal. There is no distension.      Palpations: Abdomen is soft.      Tenderness: There is no abdominal tenderness. There is no guarding or rebound.   Musculoskeletal:        Arms:       Comments: Right arm PICC line   Skin:      Coloration: Skin is jaundiced.   Neurological:      General: No focal deficit present.      Mental Status: He is oriented to person, place, and time. Mental status is at baseline.   Psychiatric:         Mood and Affect: Mood normal.         Behavior: Behavior normal.         Thought Content: Thought content normal.         Judgment: Judgment normal.       Significant Labs:  CBC:   Recent Labs   Lab 04/04/22  1135 04/05/22  1037   WBC 5.20 4.79   HGB 10.4* 9.5*   HCT 29.2* 25.6*    229     CMP:   Recent Labs   Lab 04/05/22  1037   *   CALCIUM 9.6   ALBUMIN 2.4*   PROT 6.6   *   K 3.4*   CO2 22*   CL 99   BUN 17   CREATININE 0.8   ALKPHOS 977*   ALT 62*   AST 90*   BILITOT 18.7*     Coagulation: No results for input(s): PT, INR, APTT in the last 48 hours.    Significant Imaging:  Imaging results within the past 24 hours have been reviewed.    Assessment/Plan:     * Pancreatic mass  - pancreatic head mass on US  - CA 19-9 pending    Chronic combined systolic and diastolic congestive heart failure  Echo    Interpretation Summary  · The left ventricle is moderately enlarged with severely decreased systolic function.  · The estimated ejection fraction is 15%.  · Grade III left ventricular diastolic dysfunction.  · Normal right ventricular size with moderately reduced right ventricular systolic function.  · There is mild aortic valve stenosis.  · Mild to moderate tricuspid regurgitation.  · Moderate mitral regurgitation.  · Moderate left atrial enlargement.  · The estimated PA systolic pressure is 47 mmHg.  · There is pulmonary hypertension.  .   Recent Labs   Lab 04/05/22  1037   *   .      Recommendations:  1. Hold Eliquis  2. EUS with FNA and possible ERCP  3. Zosyn 4.5g IV q 8 hours  4. Dobutamine gtt per CV  5. F/U CA 19-9 lab    Thank you for your consult. Per CV, patient is high risk for endoscopic procedure. He is obstructed and has been empirically started on antibiotics. He was provided  with educational material of pancreatic/biliary anatomy and discussed possible EUS and ERCP once he is safely off anticoagulation. Give patient's significant cardiac co-morbidities and the advance nature of the pancreatic mass on imaging, palliative stent placement may be appropriate. He is not a surgical candidate or candidate for systemic chemotherapy due to his cardiac history.  I will follow-up with patient. Please contact us if you have any additional questions.      Felisa Quintero MD  Gastroenterology  [facility] - Telemetry ([facility])

## 2022-04-05 NOTE — ASSESSMENT & PLAN NOTE
-No angina  -s/p recent LHC in 3/22 at [facility] which showed patent grafts  -Hold ASA/Plavix given impending procedure, resume post-op ASAP  -No statin given jaundice

## 2022-04-05 NOTE — TELEPHONE ENCOUNTER
mart and alk phos elevated , done for jaundice work up, patient on dobutamine drip, with relatively normal AST/ALT   Possible obstructive jaundice, needs prompt work up , with US/CT scan , amylase lipase   Advised to go to the ED, patient not sure where to go , [facility] or [facility] , states he will go to [facility] later on today

## 2022-04-05 NOTE — SUBJECTIVE & OBJECTIVE
Past Medical History:   Diagnosis Date    Arthritis     Cardiomyopathy     Ischemic    CHF (congestive heart failure)     Coronary artery disease     Dyslipidemia     Hyperlipidemia     Hypertension     Mitral insufficiency     Myocardial infarction     Peripheral arterial disease     Pneumonia 2021    Pulmonary hypertension        Past Surgical History:   Procedure Laterality Date    CORONARY ARTERY BYPASS GRAFT      Lima to LAD, saphenous vein graft to right PDA    INSERTION OF IMPLANTABLE CARDIOVERTER-DEFIBRILLATOR (ICD) GENERATOR WITH TWO EXISTING LEADS         Review of patient's allergies indicates:  No Known Allergies  Family History    None       Tobacco Use    Smoking status: Former Smoker     Types: Cigarettes     Quit date:      Years since quittin.2    Smokeless tobacco: Never Used   Substance and Sexual Activity    Alcohol use: Not Currently    Drug use: Never    Sexual activity: Not on file     Review of Systems   Constitutional:  Positive for fatigue and unexpected weight change.   Skin:  Positive for color change.   Neurological:  Positive for weakness.   Objective:     Vital Signs (Most Recent):  Temp: 97.9 °F (36.6 °C) (22 1454)  Pulse: 87 (22 1454)  Resp: 18 (22 1454)  BP: 109/72 (22 1454)  SpO2: 99 % (22 1454) Vital Signs (24h Range):  Temp:  [97.9 °F (36.6 °C)-98 °F (36.7 °C)] 97.9 °F (36.6 °C)  Pulse:  [78-92] 87  Resp:  [13-18] 18  SpO2:  [99 %-100 %] 99 %  BP: ()/(55-72) 109/72     Weight: 89.4 kg (197 lb) (22 1202)  Body mass index is 25.29 kg/m².    No intake or output data in the 24 hours ending 22 1730    Lines/Drains/Airways       Peripherally Inserted Central Catheter Line  Duration             PICC Double Lumen right brachial -- days                    Physical Exam  Vitals and nursing note reviewed.   Constitutional:       Appearance: He is ill-appearing.   Eyes:      General: Scleral icterus present.   Cardiovascular:       Rate and Rhythm: Normal rate and regular rhythm.      Heart sounds: Heart sounds are distant. Murmur heard.   Systolic murmur is present with a grade of 3/6.   Abdominal:      General: Bowel sounds are normal. There is no distension.      Palpations: Abdomen is soft.      Tenderness: There is no abdominal tenderness. There is no guarding or rebound.   Musculoskeletal:        Arms:       Comments: Right arm PICC line   Skin:     Coloration: Skin is jaundiced.   Neurological:      General: No focal deficit present.      Mental Status: He is oriented to person, place, and time. Mental status is at baseline.   Psychiatric:         Mood and Affect: Mood normal.         Behavior: Behavior normal.         Thought Content: Thought content normal.         Judgment: Judgment normal.       Significant Labs:  CBC:   Recent Labs   Lab 04/04/22  1135 04/05/22  1037   WBC 5.20 4.79   HGB 10.4* 9.5*   HCT 29.2* 25.6*    229     CMP:   Recent Labs   Lab 04/05/22  1037   *   CALCIUM 9.6   ALBUMIN 2.4*   PROT 6.6   *   K 3.4*   CO2 22*   CL 99   BUN 17   CREATININE 0.8   ALKPHOS 977*   ALT 62*   AST 90*   BILITOT 18.7*     Coagulation: No results for input(s): PT, INR, APTT in the last 48 hours.    Significant Imaging:  Imaging results within the past 24 hours have been reviewed.

## 2022-04-05 NOTE — ED PROVIDER NOTES
SCRIBE #1 NOTE: I, Becky Mcguire, am scribing for, and in the presence of, Edson Santa MD. I have scribed the entire note.      History      Chief Complaint   Patient presents with    Jaundice     Pt seen at [facility] yesterday. Referred to ED by cardiologist for jaundice and dark urine. Pt offers no CC       Review of patient's allergies indicates:  No Known Allergies     HPI   HPI    4/5/2022, 10:07 AM   History obtained from the patient      History of Present Illness: Avery Earl is a 66 y.o. male patient with a PMHx of cardiomyopathy, CHF, CAD, dyslipidemia, HLD, HTN, mitral insufficiency, MI, peripheral aterial disease, and pulmonary hypertension who presents to the Emergency Department for jaundice. The pt was seen yesterday by Dr. Infante (Cardiology) and had lab work done during his visit. Today, the pt was referred to the ER by Dr. Infante for an evaluation of jaundice and elevated bilirubin and alkaline phosphatase. The pt reports that he is unsure of the onset of his jaundice. Symptoms are constant and moderate in severity. No mitigating or exacerbating factors reported. Associated sxs include dark urine and epigastric abdominal pain. Patient denies any fever, chills, CP, SOB, N/V/D, weakness, and all other sxs at this time. No prior Tx reported. The pt denies a history of hepatitis. He reports that he used to drink EtOH, but stopped 4 to 5 months ago. No further complaints or concerns at this time.         Arrival mode: Personal vehicle     PCP: Keven Cuadra DO       Past Medical History:  Past Medical History:   Diagnosis Date    Arthritis     Cardiomyopathy     Ischemic    CHF (congestive heart failure)     Coronary artery disease     Dyslipidemia     Hyperlipidemia     Hypertension     Mitral insufficiency     Myocardial infarction     Peripheral arterial disease     Pneumonia 12/2021    Pulmonary hypertension        Past Surgical History:  Past Surgical History:   Procedure  Laterality Date    CORONARY ARTERY BYPASS GRAFT      Lima to LAD, saphenous vein graft to right PDA    INSERTION OF IMPLANTABLE CARDIOVERTER-DEFIBRILLATOR (ICD) GENERATOR WITH TWO EXISTING LEADS           Family History:  No family history on file.    Social History:  Social History     Tobacco Use    Smoking status: Former Smoker     Types: Cigarettes     Quit date:      Years since quittin.2    Smokeless tobacco: Never Used   Substance and Sexual Activity    Alcohol use: Not Currently    Drug use: Never    Sexual activity: Not on file       ROS   Review of Systems   Constitutional: Negative for chills and fever.   HENT: Negative for sore throat.    Respiratory: Negative for shortness of breath.    Cardiovascular: Negative for chest pain.   Gastrointestinal: Positive for abdominal pain (epigastric). Negative for diarrhea, nausea and vomiting.   Genitourinary: Negative for dysuria.        (+) dark urine   Musculoskeletal: Negative for back pain.   Skin: Negative for rash.        (+) jaundice   Neurological: Negative for weakness.   Hematological: Does not bruise/bleed easily.   All other systems reviewed and are negative.    Physical Exam      Initial Vitals [22 0950]   BP Pulse Resp Temp SpO2   (!) 86/55 92 14 98 °F (36.7 °C) 100 %      MAP       --          Physical Exam  Nursing Notes and Vital Signs Reviewed.  Constitutional: Patient is in no acute distress. Elderly and frail.  Head: Atraumatic. Normocephalic.  Eyes: PERRL. EOM intact. Conjunctivae are not pale. There is scleral icterus.  ENT: Mucous membranes are moist. Oropharynx is clear and symmetric.    Neck: Supple. Full ROM. No lymphadenopathy.  Cardiovascular: Regular rate. Regular rhythm. No murmurs, rubs, or gallops. Distal pulses are 2+ and symmetric.  Pulmonary/Chest: No respiratory distress. Clear to auscultation bilaterally. No wheezing or rales.  Abdominal: Soft and non-distended.  There is no tenderness.  No rebound,  "guarding, or rigidity.   Musculoskeletal: Moves all extremities. No obvious deformities. No edema.  Skin: Warm and dry.  Neurological:  Alert, awake, and appropriate.  Normal speech.  No acute focal neurological deficits are appreciated.  Psychiatric: Normal affect. Good eye contact. Appropriate in content.    ED Course    Procedures  ED Vital Signs:  Vitals:    04/05/22 0950 04/05/22 1009 04/05/22 1027 04/05/22 1202   BP: (!) 86/55 (!) 101/59  107/63   Pulse: 92 78 79 78   Resp: 14 17  13   Temp: 98 °F (36.7 °C)      TempSrc: Oral      SpO2: 100% 99%  100%   Weight: 89.4 kg (197 lb 1.5 oz)      Height: 6' 2" (1.88 m)          Abnormal Lab Results:  Labs Reviewed   CBC W/ AUTO DIFFERENTIAL - Abnormal; Notable for the following components:       Result Value    RBC 3.34 (*)     Hemoglobin 9.5 (*)     Hematocrit 25.6 (*)     MCV 77 (*)     MCHC 37.1 (*)     RDW 16.4 (*)     All other components within normal limits   COMPREHENSIVE METABOLIC PANEL - Abnormal; Notable for the following components:    Sodium 132 (*)     Potassium 3.4 (*)     CO2 22 (*)     Glucose 135 (*)     Albumin 2.4 (*)     Total Bilirubin 18.7 (*)     Alkaline Phosphatase 977 (*)     AST 90 (*)     ALT 62 (*)     All other components within normal limits   URINALYSIS, REFLEX TO URINE CULTURE - Abnormal; Notable for the following components:    Color, UA Brown (*)     Appearance, UA Hazy (*)     Protein, UA 1+ (*)     Ketones, UA Trace (*)     Bilirubin (UA) 3+ (*)     Nitrite, UA Positive (*)     Leukocytes, UA Trace (*)     All other components within normal limits    Narrative:     Specimen Source->Urine   B-TYPE NATRIURETIC PEPTIDE - Abnormal; Notable for the following components:     (*)     All other components within normal limits   SALICYLATE LEVEL - Abnormal; Notable for the following components:    Salicylate Lvl <5.0 (*)     All other components within normal limits   ACETAMINOPHEN LEVEL - Abnormal; Notable for the following " components:    Acetaminophen (Tylenol), Serum 3.0 (*)     All other components within normal limits   URINALYSIS MICROSCOPIC - Abnormal; Notable for the following components:    Bacteria Few (*)     Granular Casts, UA 2 (*)     All other components within normal limits    Narrative:     Specimen Source->Urine   AMMONIA   LIPASE   CK   TROPONIN I   ALCOHOL,MEDICAL (ETHANOL)   HEPATITIS C ANTIBODY    Narrative:     Release to patient->Immediate   HEP C VIRUS HOLD SPECIMEN    Narrative:     Release to patient->Immediate   SARS-COV-2 RNA AMPLIFICATION, QUAL   DRUG SCREEN PANEL, URINE EMERGENCY   HEPATITIS PANEL, ACUTE        All Lab Results:  Results for orders placed or performed during the hospital encounter of 04/05/22   CBC Auto Differential   Result Value Ref Range    WBC 4.79 3.90 - 12.70 K/uL    RBC 3.34 (L) 4.60 - 6.20 M/uL    Hemoglobin 9.5 (L) 14.0 - 18.0 g/dL    Hematocrit 25.6 (L) 40.0 - 54.0 %    MCV 77 (L) 82 - 98 fL    MCH 28.4 27.0 - 31.0 pg    MCHC 37.1 (H) 32.0 - 36.0 g/dL    RDW 16.4 (H) 11.5 - 14.5 %    Platelets 229 150 - 450 K/uL    MPV 9.3 9.2 - 12.9 fL    Immature Granulocytes 0.2 0.0 - 0.5 %    Gran # (ANC) 2.7 1.8 - 7.7 K/uL    Immature Grans (Abs) 0.01 0.00 - 0.04 K/uL    Lymph # 1.3 1.0 - 4.8 K/uL    Mono # 0.7 0.3 - 1.0 K/uL    Eos # 0.1 0.0 - 0.5 K/uL    Baso # 0.02 0.00 - 0.20 K/uL    nRBC 0 0 /100 WBC    Gran % 56.0 38.0 - 73.0 %    Lymph % 26.9 18.0 - 48.0 %    Mono % 14.0 4.0 - 15.0 %    Eosinophil % 2.5 0.0 - 8.0 %    Basophil % 0.4 0.0 - 1.9 %    Differential Method Automated    Ammonia   Result Value Ref Range    Ammonia 26 10 - 50 umol/L   Lipase   Result Value Ref Range    Lipase 17 4 - 60 U/L   Comprehensive Metabolic Panel   Result Value Ref Range    Sodium 132 (L) 136 - 145 mmol/L    Potassium 3.4 (L) 3.5 - 5.1 mmol/L    Chloride 99 95 - 110 mmol/L    CO2 22 (L) 23 - 29 mmol/L    Glucose 135 (H) 70 - 110 mg/dL    BUN 17 8 - 23 mg/dL    Creatinine 0.8 0.5 - 1.4 mg/dL    Calcium  9.6 8.7 - 10.5 mg/dL    Total Protein 6.6 6.0 - 8.4 g/dL    Albumin 2.4 (L) 3.5 - 5.2 g/dL    Total Bilirubin 18.7 (H) 0.1 - 1.0 mg/dL    Alkaline Phosphatase 977 (H) 55 - 135 U/L    AST 90 (H) 10 - 40 U/L    ALT 62 (H) 10 - 44 U/L    Anion Gap 11 8 - 16 mmol/L    eGFR if African American >60 >60 mL/min/1.73 m^2    eGFR if non African American >60 >60 mL/min/1.73 m^2   Urinalysis, Reflex to Urine Culture Urine, Clean Catch    Specimen: Urine   Result Value Ref Range    Specimen UA Urine, Clean Catch     Color, UA Brown (A) Yellow, Straw, Deysi    Appearance, UA Hazy (A) Clear    pH, UA 6.0 5.0 - 8.0    Specific Gravity, UA 1.025 1.005 - 1.030    Protein, UA 1+ (A) Negative    Glucose, UA Negative Negative    Ketones, UA Trace (A) Negative    Bilirubin (UA) 3+ (A) Negative    Occult Blood UA Negative Negative    Nitrite, UA Positive (A) Negative    Urobilinogen, UA 1.0 <2.0 EU/dL    Leukocytes, UA Trace (A) Negative   BNP   Result Value Ref Range     (H) 0 - 99 pg/mL   CK   Result Value Ref Range    CPK 25 20 - 200 U/L   Troponin I   Result Value Ref Range    Troponin I 0.010 0.000 - 0.026 ng/mL   Ethanol   Result Value Ref Range    Alcohol, Serum <10 <10 mg/dL   Salicylate Level   Result Value Ref Range    Salicylate Lvl <5.0 (L) 15.0 - 30.0 mg/dL   Acetaminophen Level   Result Value Ref Range    Acetaminophen (Tylenol), Serum 3.0 (L) 10.0 - 20.0 ug/mL   Hepatitis C Antibody   Result Value Ref Range    Hepatitis C Ab Negative Negative   HCV Virus Hold Specimen   Result Value Ref Range    HEP C Virus Hold Specimen Hold for HCV sendout    COVID-19 Rapid Screening   Result Value Ref Range    SARS-CoV-2 RNA, Amplification, Qual Negative Negative   Urinalysis Microscopic   Result Value Ref Range    RBC, UA 3 0 - 4 /hpf    WBC, UA 5 0 - 5 /hpf    Bacteria Few (A) None-Occ /hpf    Hyaline Casts, UA 1 0-1/lpf /lpf    Granular Casts, UA 2 (A) None /lpf    Microscopic Comment SEE COMMENT          Imaging  Results:  Imaging Results          US Abdomen Limited (Final result)  Result time 04/05/22 12:17:53    Final result by Lacho Hair MD (04/05/22 12:17:53)                 Impression:      3.6 cm solid mass seen at the level the head of the pancreas with significant ductal dilatation of the pancreatic duct measuring up to 11 mm.   Moderate intrahepatic and common bile duct dilatation. 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  Recommend ERCP with endoscopic ultrasound and tissue sampling.      Electronically signed by: Lacho Hair MD  Date:    04/05/2022  Time:    12:17             Narrative:    EXAMINATION:  US ABDOMEN LIMITED    CLINICAL HISTORY:  jaundice;    COMPARISON:  None    FINDINGS:  Limited right upper quadrant ultrasound performed.  The liver measures 18.5 cm in length and is homogeneous in echogenicity.  3.7 and 1.6 cm solid lesions are seen within the right hepatic lobe concerning for metastatic disease.  Portal vein is patent.  Common bile duct is dilated at 15 mm.  Moderate intrahepatic ductal dilatation.  Sludge is seen within the gallbladder.  Gallbladder is moderately distended.  No gallstones, gallbladder wall thickening, or focal tenderness over the gallbladder.  3.6 cm solid mass seen at the level the head of the pancreas with significant ductal dilatation of the pancreatic duct measuring up to 11 mm.  The right kidney is normal in length measuring 12 cm.  14 mm cyst is seen within the right kidney.  No right sided hydronephrosis or renal masses identified.                               X-Ray Chest AP Portable (Final result)  Result time 04/05/22 10:19:12    Final result by Lacho Hair MD (04/05/22 10:19:12)                 Impression:      No acute process seen.      Electronically signed by: Lacho Hair MD  Date:    04/05/2022  Time:    10:19             Narrative:    EXAMINATION:  XR CHEST AP PORTABLE    CLINICAL  HISTORY:  weakness;    FINDINGS:  Single view of the chest.  No comparison.    Left-sided pacing wire noted.  Right-sided PICC line tip overlies the SVC.    Cardiac silhouette is mildly enlarged.  Aorta demonstrates atherosclerotic disease..  The lungs demonstrate no evidence of active disease.  No evidence of pleural effusion or pneumothorax.  Bones demonstrate scattered degenerative change.                               The EKG was ordered, reviewed, and independently interpreted by the ED provider.  Interpretation time: 10:17  Rate: 78 BPM  Rhythm: Sinus rhythm with frequent premature ventricular complexes  Interpretation: Low voltage QRS. Cannot rule out Inferior infarct, age undetermined. No STEMI.           The Emergency Provider reviewed the vital signs and test results, which are outlined above.    ED Discussion     12:46 PM: Discussed pt's case with Dr. Darby (Gastroenterology) who recommends admission to Hospital Medicine.    12:58 PM: Discussed pt's case with Dr. Darby (Gastroenterology) who recommends a cardiac evaluation and holding Plavix. Dr. Darby states that the pt can eat today.    1:02 PM: Discussed pt's case with Dr. Quintero (Gastroenterology) who recommends admission for ERCP.    1:12 PM: Discussed pt's case with Dr. Infante (Cardiology) who states that the pt can be off of blood thinners and recommends an inpatient workup, if GI agrees, and palliative evaluation if the pancreatic mass is malignant.    1:12 PM: Discussed case with Maida Jamil NP (Hospital Medicine). Dr. Dyson agrees with current care and management of pt and accepts admission.   Admitting Service: Hospital Medicine  Admitting Physician: Dr. Dyson  Admit to: Obs Tele    1:13 PM: Re-evaluated pt. I have discussed test results, shared treatment plan, and the need for admission with patient and family at bedside. Pt and family express understanding at this time and agree with all information. All questions  answered. Pt and family have no further questions or concerns at this time. Pt is ready for admit.         ED Medication(s):  Medications - No data to display        New Prescriptions    No medications on file         Medical Decision Making    Medical Decision Making:   Clinical Tests:   Lab Tests: Ordered and Reviewed  Radiological Study: Ordered and Reviewed  Medical Tests: Ordered and Reviewed           Scribe Attestation:   Scribe #1: I performed the above scribed service and the documentation accurately describes the services I performed. I attest to the accuracy of the note.    Attending:   Physician Attestation Statement for Scribe #1: I, Edson Santa MD, personally performed the services described in this documentation, as scribed by Becky Mcguire, in my presence, and it is both accurate and complete.          Clinical Impression       ICD-10-CM ICD-9-CM   1. Jaundice  R17 782.4   2. Weakness  R53.1 780.79   3. Pancreatic mass  K86.89 577.8   4. CHF (congestive heart failure)  I50.9 428.0       Disposition:   Disposition: Placed in Observation  Condition: Fair         Edson Santa MD  04/05/22 [id number]

## 2022-04-05 NOTE — ASSESSMENT & PLAN NOTE
ERCP warranted for biopsy - GI consult pending  Hold Eliquis x 2 days as stated by Dr. Chauhan for patient to eat

## 2022-04-05 NOTE — HPI
66 y.o male with chronic systolic CHF (on chronic dobutamine infusion/inotropic therapy), CAD s/p CABG x 2, s/p AICD, PAD and severe MR was referred to ED today for painless jaundice present for 2 weeks. In ED, labs revealed abnormal LFTs including AST 90, ALT 62 and total bilirubin of 18.  US abdomen performed today shows 3.6 cm solid mass seen at the level the head of the pancreas with significant ductal dilatation of the pancreatic duct measuring up to 11 mm.   Moderate intrahepatic and extrheptic dilatation also seen. GI has been asked to evaluate patient and aid in diagnosis.    Patient last took Eliquis this morning. He is seen in his room. Denies abdominal pain. No previous imaging at [facility]. Outside records shows he had a CT scan abd/pelvis in 2017. Pancreas was unremarkable. Colonoscopy in 2013 and EGD in 2015 however unable to view records. No family history of pancreatic cancer. No history of chronic pancreatitis.

## 2022-04-05 NOTE — ASSESSMENT & PLAN NOTE
-Patient on Eliquis as OP for possible DVT  -Ok to hold for planned procedure and resume post-operatively ASAP

## 2022-04-05 NOTE — PHARMACY MED REC
"Admission Medication History     The home medication history was taken by Buster Shay.    You may go to "Admission" then "Reconcile Home Medications" tabs to review and/or act upon these items.      The home medication list has been updated by the Pharmacy department.    Please read ALL comments highlighted in yellow.    Please address this information as you see fit.     Feel free to contact us if you have any questions or require assistance.      The medications listed below were removed from the home medication list. Please reorder if appropriate:  Patient reports no longer taking the following medication(s):   PLAVIX 75MG   FOLIC ACID 1MG   TOPROL XL 25MG    Medications listed below were obtained from: Patient/family  (Not in a hospital admission)    Buster Shay  [id number]    Current Outpatient Medications on File Prior to Encounter   Medication Sig Dispense Refill Last Dose    apixaban (ELIQUIS) 5 mg Tab Take 5 mg by mouth once daily.   4/5/2022 at Unknown time    aspirin 81 MG Chew Take 81 mg by mouth once daily.   4/5/2022 at Unknown time    b complex vitamins capsule Take 1 capsule by mouth once daily.   4/5/2022 at Unknown time    bumetanide (BUMEX) 1 MG tablet Take 1 mg by mouth once daily.   4/5/2022 at Unknown time    cholecalciferol, vitamin D3, (VITAMIN D3) 25 mcg (1,000 unit) capsule Take 1,000 Units by mouth once daily.   4/5/2022 at Unknown time    ENTRESTO 49-51 mg per tablet Take 49-51 tablets by mouth 2 (two) times a day.   4/5/2022 at Unknown time    omega-3/dha/epa/ala/vitamin D3 (OMEGA-3-DHA-EPA-ALA-VIT D3 ORAL) Take 2 capsules by mouth once daily at 6am.   4/5/2022 at Unknown time    potassium chloride SA (K-DUR,KLOR-CON) 20 MEQ tablet Take 20 mEq by mouth once daily.   4/5/2022 at Unknown time    spironolactone (ALDACTONE) 25 MG tablet Take 25 mg by mouth once daily.   4/5/2022 at Unknown time    [DISCONTINUED] clopidogreL (PLAVIX) 75 mg tablet Take 75 mg by " mouth once daily.       [DISCONTINUED] folic acid (FOLVITE) 1 MG tablet Take 1 mg by mouth once daily.       [DISCONTINUED] metoprolol succinate (TOPROL-XL) 25 MG 24 hr tablet Take 25 mg by mouth 2 (two) times a day.                                .

## 2022-04-05 NOTE — CONSULTS
[facility] ([facility])  Gastroenterology  Consult Note    Patient Name: Avery Earl  MRN: [id number]  Admission Date: 4/5/2022  Hospital Length of Stay: 0 days  Code Status: Full Code   Attending Provider: Jose Rafael Dyson MD   Consulting Provider: Felisa Quintero MD  Primary Care Physician: Keven Cuadra DO  Principal Problem:Pancreatic mass    Inpatient consult to Gastroenterology  Consult performed by: Felisa Quintero MD  Consult ordered by: Edson Santa MD  Reason for consult: pancreatic mass        Subjective:     HPI:  66 y.o male with chronic systolic CHF (on chronic dobutamine infusion/inotropic therapy), CAD s/p CABG x 2, s/p AICD, PAD and severe MR was referred to ED today for painless jaundice present for 2 weeks. In ED, labs revealed abnormal LFTs including AST 90, ALT 62 and total bilirubin of 18.  US abdomen performed today shows 3.6 cm solid mass seen at the level the head of the pancreas with significant ductal dilatation of the pancreatic duct measuring up to 11 mm.   Moderate intrahepatic and extrheptic dilatation also seen. GI has been asked to evaluate patient and aid in diagnosis.    Patient last took Eliquis this morning. He is seen in his room. Denies abdominal pain. No previous imaging at [facility]. Outside records shows he had a CT scan abd/pelvis in 2017. Pancreas was unremarkable. Colonoscopy in 2013 and EGD in 2015 however unable to view records. No family history of pancreatic cancer. No history of chronic pancreatitis.       Past Medical History:   Diagnosis Date    Arthritis     Cardiomyopathy     Ischemic    CHF (congestive heart failure)     Coronary artery disease     Dyslipidemia     Hyperlipidemia     Hypertension     Mitral insufficiency     Myocardial infarction     Peripheral arterial disease     Pneumonia 12/2021    Pulmonary hypertension        Past Surgical History:   Procedure Laterality Date    CORONARY ARTERY BYPASS GRAFT      Dodson to LAD,  saphenous vein graft to right PDA    INSERTION OF IMPLANTABLE CARDIOVERTER-DEFIBRILLATOR (ICD) GENERATOR WITH TWO EXISTING LEADS         Review of patient's allergies indicates:  No Known Allergies  Family History    None       Tobacco Use    Smoking status: Former Smoker     Types: Cigarettes     Quit date:      Years since quittin.2    Smokeless tobacco: Never Used   Substance and Sexual Activity    Alcohol use: Not Currently    Drug use: Never    Sexual activity: Not on file     Review of Systems   Constitutional:  Positive for fatigue and unexpected weight change.   Skin:  Positive for color change.   Neurological:  Positive for weakness.   Objective:     Vital Signs (Most Recent):  Temp: 97.9 °F (36.6 °C) (22 1454)  Pulse: 87 (22 1454)  Resp: 18 (22 1454)  BP: 109/72 (22 1454)  SpO2: 99 % (22 1454) Vital Signs (24h Range):  Temp:  [97.9 °F (36.6 °C)-98 °F (36.7 °C)] 97.9 °F (36.6 °C)  Pulse:  [78-92] 87  Resp:  [13-18] 18  SpO2:  [99 %-100 %] 99 %  BP: ()/(55-72) 109/72     Weight: 89.4 kg (197 lb) (22 1202)  Body mass index is 25.29 kg/m².    No intake or output data in the 24 hours ending 22 1730    Lines/Drains/Airways       Peripherally Inserted Central Catheter Line  Duration             PICC Double Lumen right brachial -- days                    Physical Exam  Vitals and nursing note reviewed.   Constitutional:       Appearance: He is ill-appearing.   Eyes:      General: Scleral icterus present.   Cardiovascular:      Rate and Rhythm: Normal rate and regular rhythm.      Heart sounds: Heart sounds are distant. Murmur heard.   Systolic murmur is present with a grade of 3/6.   Abdominal:      General: Bowel sounds are normal. There is no distension.      Palpations: Abdomen is soft.      Tenderness: There is no abdominal tenderness. There is no guarding or rebound.   Musculoskeletal:        Arms:       Comments: Right arm PICC line   Skin:      Coloration: Skin is jaundiced.   Neurological:      General: No focal deficit present.      Mental Status: He is oriented to person, place, and time. Mental status is at baseline.   Psychiatric:         Mood and Affect: Mood normal.         Behavior: Behavior normal.         Thought Content: Thought content normal.         Judgment: Judgment normal.       Significant Labs:  CBC:   Recent Labs   Lab 04/04/22  1135 04/05/22  1037   WBC 5.20 4.79   HGB 10.4* 9.5*   HCT 29.2* 25.6*    229     CMP:   Recent Labs   Lab 04/05/22  1037   *   CALCIUM 9.6   ALBUMIN 2.4*   PROT 6.6   *   K 3.4*   CO2 22*   CL 99   BUN 17   CREATININE 0.8   ALKPHOS 977*   ALT 62*   AST 90*   BILITOT 18.7*     Coagulation: No results for input(s): PT, INR, APTT in the last 48 hours.    Significant Imaging:  Imaging results within the past 24 hours have been reviewed.    Assessment/Plan:     * Pancreatic mass  - pancreatic head mass on US  - CA 19-9 pending    Chronic combined systolic and diastolic congestive heart failure  Echo    Interpretation Summary  · The left ventricle is moderately enlarged with severely decreased systolic function.  · The estimated ejection fraction is 15%.  · Grade III left ventricular diastolic dysfunction.  · Normal right ventricular size with moderately reduced right ventricular systolic function.  · There is mild aortic valve stenosis.  · Mild to moderate tricuspid regurgitation.  · Moderate mitral regurgitation.  · Moderate left atrial enlargement.  · The estimated PA systolic pressure is 47 mmHg.  · There is pulmonary hypertension.  .   Recent Labs   Lab 04/05/22  1037   *   .      Recommendations:  1. Hold Eliquis  2. EUS with FNA and possible ERCP  3. Zosyn 4.5g IV q 8 hours  4. Dobutamine gtt per CV  5. F/U CA 19-9 lab    Thank you for your consult. Per CV, patient is high risk for endoscopic procedure. He is obstructed and has been empirically started on antibiotics. He was provided  with educational material of pancreatic/biliary anatomy and discussed possible EUS and ERCP once he is safely off anticoagulation. Give patient's significant cardiac co-morbidities and the advance nature of the pancreatic mass on imaging, palliative stent placement may be appropriate. He is not a surgical candidate or candidate for systemic chemotherapy due to his cardiac history.  I will follow-up with patient. Please contact us if you have any additional questions.      Felisa Quintero MD  Gastroenterology  [facility] ([facility])

## 2022-04-06 LAB
ALBUMIN SERPL BCP-MCNC: 2.1 G/DL (ref 3.5–5.2)
ALP SERPL-CCNC: 914 U/L (ref 55–135)
ALT SERPL W/O P-5'-P-CCNC: 56 U/L (ref 10–44)
ANION GAP SERPL CALC-SCNC: 11 MMOL/L (ref 8–16)
AST SERPL-CCNC: 80 U/L (ref 10–40)
BASOPHILS # BLD AUTO: 0.04 K/UL (ref 0–0.2)
BASOPHILS NFR BLD: 0.8 % (ref 0–1.9)
BILIRUB SERPL-MCNC: 18.1 MG/DL (ref 0.1–1)
BUN SERPL-MCNC: 14 MG/DL (ref 8–23)
CALCIUM SERPL-MCNC: 9.3 MG/DL (ref 8.7–10.5)
CANCER AG19-9 SERPL-ACNC: <2.1 U/ML (ref 0–40)
CHLORIDE SERPL-SCNC: 99 MMOL/L (ref 95–110)
CO2 SERPL-SCNC: 21 MMOL/L (ref 23–29)
CREAT SERPL-MCNC: 0.7 MG/DL (ref 0.5–1.4)
DIFFERENTIAL METHOD: ABNORMAL
EOSINOPHIL # BLD AUTO: 0.2 K/UL (ref 0–0.5)
EOSINOPHIL NFR BLD: 3.3 % (ref 0–8)
ERYTHROCYTE [DISTWIDTH] IN BLOOD BY AUTOMATED COUNT: 16.2 % (ref 11.5–14.5)
EST. GFR  (AFRICAN AMERICAN): >60 ML/MIN/1.73 M^2
EST. GFR  (NON AFRICAN AMERICAN): >60 ML/MIN/1.73 M^2
GLUCOSE SERPL-MCNC: 99 MG/DL (ref 70–110)
HAV IGM SERPL QL IA: NEGATIVE
HBV CORE IGM SERPL QL IA: NEGATIVE
HBV SURFACE AG SERPL QL IA: NEGATIVE
HCT VFR BLD AUTO: 24 % (ref 40–54)
HCV AB SERPL QL IA: NEGATIVE
HGB BLD-MCNC: 9 G/DL (ref 14–18)
IMM GRANULOCYTES # BLD AUTO: 0.02 K/UL (ref 0–0.04)
IMM GRANULOCYTES NFR BLD AUTO: 0.4 % (ref 0–0.5)
LYMPHOCYTES # BLD AUTO: 1.4 K/UL (ref 1–4.8)
LYMPHOCYTES NFR BLD: 27.8 % (ref 18–48)
MCH RBC QN AUTO: 28.7 PG (ref 27–31)
MCHC RBC AUTO-ENTMCNC: 37.5 G/DL (ref 32–36)
MCV RBC AUTO: 76 FL (ref 82–98)
MONOCYTES # BLD AUTO: 0.7 K/UL (ref 0.3–1)
MONOCYTES NFR BLD: 13.8 % (ref 4–15)
NEUTROPHILS # BLD AUTO: 2.8 K/UL (ref 1.8–7.7)
NEUTROPHILS NFR BLD: 53.9 % (ref 38–73)
NRBC BLD-RTO: 0 /100 WBC
PLATELET # BLD AUTO: 249 K/UL (ref 150–450)
PMV BLD AUTO: 9.8 FL (ref 9.2–12.9)
POTASSIUM SERPL-SCNC: 3.4 MMOL/L (ref 3.5–5.1)
PROT SERPL-MCNC: 6.1 G/DL (ref 6–8.4)
RBC # BLD AUTO: 3.14 M/UL (ref 4.6–6.2)
SODIUM SERPL-SCNC: 131 MMOL/L (ref 136–145)
WBC # BLD AUTO: 5.14 K/UL (ref 3.9–12.7)

## 2022-04-06 PROCEDURE — 99233 PR SUBSEQUENT HOSPITAL CARE,LEVL III: ICD-10-PCS | Mod: ,,, | Performed by: INTERNAL MEDICINE

## 2022-04-06 PROCEDURE — 63600175 PHARM REV CODE 636 W HCPCS: Performed by: INTERNAL MEDICINE

## 2022-04-06 PROCEDURE — 36415 COLL VENOUS BLD VENIPUNCTURE: CPT | Performed by: INTERNAL MEDICINE

## 2022-04-06 PROCEDURE — 99233 SBSQ HOSP IP/OBS HIGH 50: CPT | Mod: ,,, | Performed by: INTERNAL MEDICINE

## 2022-04-06 PROCEDURE — 25000003 PHARM REV CODE 250: Performed by: INTERNAL MEDICINE

## 2022-04-06 PROCEDURE — 94761 N-INVAS EAR/PLS OXIMETRY MLT: CPT

## 2022-04-06 PROCEDURE — 25000003 PHARM REV CODE 250: Performed by: NURSE PRACTITIONER

## 2022-04-06 PROCEDURE — 99205 PR OFFICE/OUTPT VISIT, NEW, LEVL V, 60-74 MIN: ICD-10-PCS | Mod: ,,, | Performed by: NURSE PRACTITIONER

## 2022-04-06 PROCEDURE — 99497 ADVNCD CARE PLAN 30 MIN: CPT | Mod: 25,,, | Performed by: NURSE PRACTITIONER

## 2022-04-06 PROCEDURE — 96365 THER/PROPH/DIAG IV INF INIT: CPT | Performed by: EMERGENCY MEDICINE

## 2022-04-06 PROCEDURE — 99497 PR ADVNCD CARE PLAN 30 MIN: ICD-10-PCS | Mod: 25,,, | Performed by: NURSE PRACTITIONER

## 2022-04-06 PROCEDURE — 80053 COMPREHEN METABOLIC PANEL: CPT | Performed by: NURSE PRACTITIONER

## 2022-04-06 PROCEDURE — 85025 COMPLETE CBC W/AUTO DIFF WBC: CPT | Performed by: NURSE PRACTITIONER

## 2022-04-06 PROCEDURE — 86301 IMMUNOASSAY TUMOR CA 19-9: CPT | Performed by: INTERNAL MEDICINE

## 2022-04-06 PROCEDURE — 99205 OFFICE O/P NEW HI 60 MIN: CPT | Mod: ,,, | Performed by: NURSE PRACTITIONER

## 2022-04-06 PROCEDURE — 96366 THER/PROPH/DIAG IV INF ADDON: CPT | Performed by: EMERGENCY MEDICINE

## 2022-04-06 PROCEDURE — G0378 HOSPITAL OBSERVATION PER HR: HCPCS

## 2022-04-06 RX ORDER — DOBUTAMINE HYDROCHLORIDE 400 MG/100ML
2.5 INJECTION INTRAVENOUS CONTINUOUS
Status: DISCONTINUED | OUTPATIENT
Start: 2022-04-06 | End: 2022-04-08

## 2022-04-06 RX ORDER — POTASSIUM CHLORIDE 20 MEQ/1
20 TABLET, EXTENDED RELEASE ORAL ONCE
Status: COMPLETED | OUTPATIENT
Start: 2022-04-06 | End: 2022-04-06

## 2022-04-06 RX ADMIN — SACUBITRIL AND VALSARTAN 1 TABLET: 49; 51 TABLET, FILM COATED ORAL at 09:04

## 2022-04-06 RX ADMIN — PIPERACILLIN SODIUM AND TAZOBACTAM SODIUM 4.5 G: 4; .5 INJECTION, POWDER, FOR SOLUTION INTRAVENOUS at 09:04

## 2022-04-06 RX ADMIN — POTASSIUM CHLORIDE 20 MEQ: 1500 TABLET, EXTENDED RELEASE ORAL at 12:04

## 2022-04-06 RX ADMIN — PIPERACILLIN SODIUM AND TAZOBACTAM SODIUM 4.5 G: 4; .5 INJECTION, POWDER, FOR SOLUTION INTRAVENOUS at 05:04

## 2022-04-06 RX ADMIN — BUMETANIDE 1 MG: 1 TABLET ORAL at 09:04

## 2022-04-06 RX ADMIN — POTASSIUM CHLORIDE 20 MEQ: 1500 TABLET, EXTENDED RELEASE ORAL at 09:04

## 2022-04-06 RX ADMIN — PIPERACILLIN SODIUM AND TAZOBACTAM SODIUM 4.5 G: 4; .5 INJECTION, POWDER, FOR SOLUTION INTRAVENOUS at 03:04

## 2022-04-06 RX ADMIN — SPIRONOLACTONE 25 MG: 25 TABLET, FILM COATED ORAL at 09:04

## 2022-04-06 NOTE — SUBJECTIVE & OBJECTIVE
Review of Systems   Constitutional: Positive for malaise/fatigue.   HENT: Negative.     Eyes: Negative.    Cardiovascular:  Positive for dyspnea on exertion.   Respiratory:  Positive for shortness of breath.    Endocrine: Negative.    Hematologic/Lymphatic: Negative.    Skin: Negative.    Musculoskeletal: Negative.    Gastrointestinal:  Positive for jaundice.   Genitourinary: Negative.    Neurological:  Positive for weakness.   Psychiatric/Behavioral: Negative.     Allergic/Immunologic: Negative.    Objective:     Vital Signs (Most Recent):  Temp: 98.4 °F (36.9 °C) (04/06/22 1109)  Pulse: 81 (04/06/22 1109)  Resp: 20 (04/06/22 1109)  BP: (!) 100/56 (04/06/22 1109)  SpO2: 99 % (04/06/22 1109)   Vital Signs (24h Range):  Temp:  [97.4 °F (36.3 °C)-99.2 °F (37.3 °C)] 98.4 °F (36.9 °C)  Pulse:  [68-87] 81  Resp:  [13-20] 20  SpO2:  [97 %-100 %] 99 %  BP: ()/(51-72) 100/56     Weight: 89.4 kg (197 lb 1.5 oz)  Body mass index is 25.31 kg/m².     SpO2: 99 %  O2 Device (Oxygen Therapy): room air      Intake/Output Summary (Last 24 hours) at 4/6/2022 1141  Last data filed at 4/6/2022 1000  Gross per 24 hour   Intake 118 ml   Output --   Net 118 ml       Lines/Drains/Airways       Peripherally Inserted Central Catheter Line  Duration             PICC Double Lumen right brachial -- days                    Physical Exam  Vitals and nursing note reviewed.   Constitutional:       Appearance: He is well-developed. He is ill-appearing.   HENT:      Head: Normocephalic.   Neck:      Vascular: No carotid bruit or JVD.   Cardiovascular:      Rate and Rhythm: Normal rate and regular rhythm.      Pulses: No midsystolic click and no opening snap.           Radial pulses are 2+ on the right side and 2+ on the left side.      Heart sounds: S1 normal and S2 normal. Heart sounds not distant. Murmur heard.   Medium-pitched midsystolic murmur is present with a grade of 2/6 at the upper left sternal border.     No friction rub. No S3  or S4 sounds.   Pulmonary:      Effort: Pulmonary effort is normal.      Breath sounds: Normal breath sounds. No wheezing or rales.   Abdominal:      General: Bowel sounds are decreased. There is no distension.      Palpations: Abdomen is soft.      Tenderness: There is no abdominal tenderness.   Musculoskeletal:      Cervical back: Neck supple.   Skin:     General: Skin is warm.   Neurological:      Mental Status: He is alert and oriented to person, place, and time.   Psychiatric:         Behavior: Behavior normal. Behavior is cooperative.       Significant Labs: ABG: No results for input(s): PH, PCO2, HCO3, POCSATURATED, BE in the last 48 hours., Blood Culture: No results for input(s): LABBLOO in the last 48 hours., BMP:   Recent Labs   Lab 04/05/22  1037 04/06/22  0346   * 99   * 131*   K 3.4* 3.4*   CL 99 99   CO2 22* 21*   BUN 17 14   CREATININE 0.8 0.7   CALCIUM 9.6 9.3   , CMP   Recent Labs   Lab 04/05/22  1037 04/06/22  0346   * 131*   K 3.4* 3.4*   CL 99 99   CO2 22* 21*   * 99   BUN 17 14   CREATININE 0.8 0.7   CALCIUM 9.6 9.3   PROT 6.6 6.1   ALBUMIN 2.4* 2.1*   BILITOT 18.7* 18.1*   ALKPHOS 977* 914*   AST 90* 80*   ALT 62* 56*   ANIONGAP 11 11   ESTGFRAFRICA >60 >60   EGFRNONAA >60 >60   , CBC   Recent Labs   Lab 04/05/22  1037 04/06/22  0346   WBC 4.79 5.14   HGB 9.5* 9.0*   HCT 25.6* 24.0*    249   , INR No results for input(s): INR, PROTIME in the last 48 hours., Lipid Panel No results for input(s): CHOL, HDL, LDLCALC, TRIG, CHOLHDL in the last 48 hours., and Troponin   Recent Labs   Lab 04/05/22  1037   TROPONINI 0.010       Significant Imaging: Echocardiogram: Transthoracic echo (TTE) complete ([facility] Only):   Results for orders placed or performed during the hospital encounter of 04/05/22   Echo   Result Value Ref Range    BSA 2.16 m2    TDI SEPTAL 0.05 m/s    LV LATERAL E/E' RATIO 11.20 m/s    LV SEPTAL E/E' RATIO 22.40 m/s    LA WIDTH 4.67 cm    IVC diameter 1.42  cm    Left Ventricular Outflow Tract Mean Velocity 0.19335728138 cm/s    Left Ventricular Outflow Tract Mean Gradient 1.07 mmHg    TDI LATERAL 0.10 m/s    LVIDd 6.40 (A) 3.5 - 6.0 cm    IVS 1.27 (A) 0.6 - 1.1 cm    Posterior Wall 1.33 (A) 0.6 - 1.1 cm    Ao root annulus 3.83 cm    LVIDs 6.19 (A) 2.1 - 4.0 cm    FS 3 28 - 44 %    LA volume 89.94 cm3    Sinus 3.67 cm    STJ 3.29 cm    Ascending aorta 3.17 cm    LV mass 389.02 g    LA size 4.15 cm    TAPSE 1.81 cm    Left Ventricle Relative Wall Thickness 0.42 cm    AV mean gradient 13 mmHg    AV valve area 1.11 cm2    AV Velocity Ratio 0.29     AV index (prosthetic) 0.33     MV valve area p 1/2 method 5.14 cm2    E/A ratio 3.29     Mean e' 0.08 m/s    E wave deceleration time 147.160634335633597 msec    IVRT 62.154987125015825 msec    LVOT diameter 2.07 cm    LVOT area 3.4 cm2    LVOT peak milan 0.68 m/s    LVOT peak VTI 14.20 cm    Ao peak milan 2.37 m/s    Ao VTI 43.0 cm    RVOT peak milan 0.71 m/s    RVOT peak VTI 10.4 cm    Mr max milan 4.84 m/s    LVOT stroke volume 47.76 cm3    AV peak gradient 22 mmHg    PV mean gradient 1.29 mmHg    E/E' ratio 14.93 m/s    MV Peak E Milan 1.12 m/s    TR Max Milan 3.33 m/s    MV stenosis pressure 1/2 time 42.361984944700643 ms    MV Peak A [place] 0.34 m/s    LV Systolic Volume 193.28 mL    LV Systolic Volume Index 89.5 mL/m2    LV Diastolic Volume 208.66 mL    LV Diastolic Volume Index 96.60 mL/m2    LA Volume Index 41.6 mL/m2    LV Mass Index 180 g/m2    Echo EF Estimated 7 %    RA Major Axis 5.42 cm    Left Atrium Minor Axis 4.67 cm    Left Atrium Major Axis 6.57 cm    Triscuspid Valve Regurgitation Peak Gradient 44 mmHg    RA Width 5.42 cm    Right Atrial Pressure (from IVC) 3 mmHg    EF 15 %    TV rest pulmonary artery pressure 47 mmHg    Narrative    · The left ventricle is moderately enlarged with severely decreased   systolic function.  · The estimated ejection fraction is 15%.  · Grade III left ventricular diastolic dysfunction.  ·  Normal right ventricular size with moderately reduced right ventricular   systolic function.  · There is mild aortic valve stenosis.  · Mild to moderate tricuspid regurgitation.  · Moderate mitral regurgitation.  · Moderate left atrial enlargement.  · The estimated PA systolic pressure is 47 mmHg.  · There is pulmonary hypertension.

## 2022-04-06 NOTE — PLAN OF CARE
Recommendations     Recommendation/Intervention:   1. Rec'd Cardiac Diet with Mechanical soft restrictions.   2. Rec'd Boost Plus BID to provide an additional 720 kcal and 28g protein.   3. Rec'd honoring pt food preferences to increase PO intake.   4. RD to follow and make rec's accordingly.     Goals: Pt will meet at least 75% of ENN by next RD f/u  Nutrition Goal Status: alejandro Kaur, Student Dietitian

## 2022-04-06 NOTE — PLAN OF CARE
POC reviewed. Comfort measures and safety measures addressed. Pt wearing home IV dobutamine. MD aware.   Problem: Adult Inpatient Plan of Care  Goal: Plan of Care Review  Outcome: Ongoing, Progressing  Goal: Patient-Specific Goal (Individualized)  Outcome: Ongoing, Progressing  Goal: Absence of Hospital-Acquired Illness or Injury  Outcome: Ongoing, Progressing  Goal: Optimal Comfort and Wellbeing  Outcome: Ongoing, Progressing  Goal: Readiness for Transition of Care  Outcome: Ongoing, Progressing     Problem: Adult Inpatient Plan of Care  Goal: Patient-Specific Goal (Individualized)  Outcome: Ongoing, Progressing     Problem: Adult Inpatient Plan of Care  Goal: Absence of Hospital-Acquired Illness or Injury  Outcome: Ongoing, Progressing     Problem: Adult Inpatient Plan of Care  Goal: Optimal Comfort and Wellbeing  Outcome: Ongoing, Progressing     Problem: Adult Inpatient Plan of Care  Goal: Readiness for Transition of Care  Outcome: Ongoing, Progressing     Problem: Infection  Goal: Absence of Infection Signs and Symptoms  Outcome: Ongoing, Progressing     Problem: Skin Injury Risk Increased  Goal: Skin Health and Integrity  Outcome: Ongoing, Progressing

## 2022-04-06 NOTE — PROGRESS NOTES
[facility] - Tele[facility] ([facility])  Adult Nutrition  Progress Note    SUMMARY       Recommendations    Recommendation/Intervention:   1. Rec'd Cardiac Diet with Mechanical soft restrictions.   2. Rec'd Boost Plus BID to provide an additional 720 kcal and 28g protein.   3. Rec'd honoring pt food preferences to increase PO intake.   4. RD to follow and make rec's accordingly.    Goals: Pt will meet at least 75% of ENN by next RD f/u  Nutrition Goal Status: new  Communication of RD Recs: other (comment) (POC)    Assessment and Plan    Nutrition Problem  Inadequate oral intake    Related to (etiology):   Chewing difficulties     Signs and Symptoms (as evidenced by):   Pt only consuming 25-50% of ENN 2/2 having no teeth.    Interventions/Recommendations (treatment strategy):  1. Rec'd Cardiac Diet with Mechanical soft restrictions.   2. Rec'd Boost Plus BID to provide an additional 720 kcal and 28g protein.   3. Rec'd honoring pt food preferences to increase PO intake.   4. RD to follow and make rec's accordingly.    Nutrition Diagnosis Status:   New         Malnutrition Assessment                                       Reason for Assessment    Reason For Assessment: identified at risk by screening criteria (MST 4)  Diagnosis: other (see comments) (pancreatic mass)  Relevant Medical History: CHF, CAD, Microcytic anemia, pulmonary HTN, HLD, PVD, ischemic cardiomyopathy, CABG  Interdisciplinary Rounds: did not attend  General Information Comments: Visited Pt d/t MST score of 4.  Pt reports having a 30 lb wt loss d/t recent teeth extraction back in January. Pt states he eats soft foods and drinks Boost Plus 2x/day.  Pt reports rarley eating meats.  Pt reports abdominal pain and nausea but denies vomiting, constipation and diarrhea. No current I/O recorded and LBM was 4/4.  Nutrition Discharge Planning: TBD as care progresses    Nutrition Risk Screen    Nutrition Risk Screen: other (see comments) (chewing dificulty, no  "teeth)    Nutrition/Diet History    Patient Reported Diet/Restrictions/Preferences: soft  Typical Food/Fluid Intake: Grits, oatmeal, red beans, rice, van. pudding, ice cream  Spiritual, Cultural Beliefs, Orthodox Practices, Values that Affect Care: no  Supplemental Drinks or Food Habits: Boost Plus (vanilla)  Food Allergies: NKFA  Factors Affecting Nutritional Intake: abdominal pain, chewing difficulties/inability to chew food, decreased appetite    Anthropometrics    Temp: 99.2 °F (37.3 °C)  Height Method: Stated  Height: 6' 2" (188 cm)  Height (inches): 74 in  Weight Method: Standard Scale  Weight: 89.4 kg (197 lb 1.5 oz)  Weight (lb): 197.09 lb  Ideal Body Weight (IBW), Male: 190 lb  % Ideal Body Weight, Male (lb): 103.73 %  BMI (Calculated): 25.3  BMI Grade: 25 - 29.9 - overweight  Usual Body Weight (UBW), k kg  % Usual Body Weight: 82.19  % Weight Change From Usual Weight: -17.98 %       Lab/Procedures/Meds    Pertinent Labs Reviewed: reviewed  Pertinent Labs Comments: alb: 2.1, co2: 21, Na: 131, K+: 3.4, Ca: 9.3/10.81, AST/ALT: 80/56, bilirubin: 18.1, H/H: , MCV: 76, MCHC: 37.5  Pertinent Medications Reviewed: reviewed  Pertinent Medications Comments: ABX, K+Cl tab, bumetanide, spiroholactone    Physical Findings/Assessment         Estimated/Assessed Needs    Weight Used For Calorie Calculations: 89.4 kg (197 lb 1.5 oz)  Energy Calorie Requirements (kcal): 4380-8603  Energy Need Method: Kcal/kg (20-25 kcal/kg)  Protein Requirements: 107-134g  Weight Used For Protein Calculations: 89.4 kg (197 lb 1.5 oz)  Fluid Requirements (mL): 1500 ml or per MD/NP  Estimated Fluid Requirement Method: other (see comments) (CHF rec's or MD/NP)  RDA Method (mL): 1788         Nutrition Prescription Ordered    Current Diet Order: cardiac    Evaluation of Received Nutrient/Fluid Intake    % Kcal Needs: 25-50%  % Protein Needs: 25-50%  Energy Calories Required: not meeting needs  Protein Required: not meeting needs  % " Intake of Estimated Energy Needs: 25 - 50 %  % Meal Intake: 25 - 50 %    Nutrition Risk    Level of Risk/Frequency of Follow-up: moderate - high     Monitor and Evaluation    Food and Nutrient Intake: energy intake, food and beverage intake  Food and Nutrient Adminstration: diet order  Knowledge/Beliefs/Attitudes: food and nutrition knowledge/skill, beliefs and attitudes  Physical Activity and Function: nutrition-related ADLs and IADLs  Anthropometric Measurements: height/length, weight, body mass index, weight change, growth pattern indices/percentile ranks  Biochemical Data, Medical Tests and Procedures: electrolyte and renal panel, gastrointestinal profile, glucose/endocrine profile, inflammatory profile, lipid profile  Nutrition-Focused Physical Findings: overall appearance     Nutrition Follow-Up    RD Follow-up?: Yes    Diane Kaur, Student Dietitian

## 2022-04-06 NOTE — CONSULTS
"Advance Care Planning    Consult Note  Palliative Medicine      Consult Requested By: DANYA Galindo with GI  Reason for Consult: Goals of care and Advance care planning  SUBJECTIVE:     History of Present Illness:  Avery Earl is a 66 y.o. year old AAM with a past medical history of chronic systolic CHF (on chronic dobutamine infusion/inotropic therapy)-EF 15%, CAD s/p CABG x 2, s/p AICD, PAD, and severe MR who was sent to hospital from cardiology clinic due to jaundice. Imaging revealed abdominal pancreatic head mass with significant ductal dilatation of the pancreatic duct as well as moderate intrahepatic and common bile duct dilatation, concerning for primary pancreatic malignancy with metastatic disease to the liver. GI consulted with plans for ERCP with stenting and tissue biopsy.  Patient is high-risk for cardiac event for procedure.   We were consulted to clarify GOC and begin ACP.      NARRATIVE     I had the opportunity to meet with patient along with his girlfriend Delmi at the bedside to introduce palliative medicine and our supportive role.  Patient is able to articulate the reasons for his hospitalization and knows he has a "mass on his pancreas".   We talked openly about the concerns for malignancy which have not been verified as he awaits procedure Friday with tissue biopsy.  Patient has AICD and end stage heart failure on continuous dobutamine.  He knows he is a high risk for a cardiac event.  Because of this, I explained the importance of advance care planning as well as goals of care.      We also discussed resuscitation, what it means, when it may be helpful, and when it may not be helpful. We clarified that code status is a term used to signify to the medical team what the plan of care is at the time of cardiac or respiratory arrest, especially knowing he is high risk for cardiac event for an upcoming procedure.   We defined the difference between a "full code" status and a "do not attempt " "resuscitation" status.  At this time, patient is aware of his high risks, yet elects to remain a FULL CODE.  His girlfriend Delmi also supports this decision.  We talked about why a Do Not Resuscitate is recommended in patients with life-limiting illness, especially considering he has a suspected malignancy.  At this point, they have dealt with his long-standing cardiac issues and have pushed for aggressive care.  Until his has a confirmed diagnosis of cancer, they are not ready to consider any other options.  I prepared them for more difficult discussions and we even dicussed my outpatient palliative clinic.      We will be available to continue difficult discussions pending his course, but he wanting to know more about his diagnosis and options.      We will follow peripherally and re-engage as needed.    ASSESSEMENT / PLAN     1.  Encounter for Palliative Care   -patient is decisional   -patient is not ; he has a girlfriend Delmi Mabry   -patient has two adult children: son Avery Dietrich and cathy Manzo   -philosophy of palliative medicine reviewed with patient and family   -new patient folder given to and reviewed with patient and family   -reviewed importance of ACP documents   -HCPOA:  Cathy Earl at [phone]; uploaded in EM   -patient is a FULL CODE    2.    Pancreatic mass; suspected malignancy with liver involvement         Obstructive jaundice   -GI consulted   -plans for ERCP with stenting/tissue biopsy                    3.    Chronic combined systolic and diastolic heart failure          Ischemic cardiomyopathy          CAD   -cardiology following   -ASA and eliquis on hold for procedure   -EF 15%   -on dobutamine   -high risk for procedure/surgery      Thank you for allowing Palliative Medicine to be involved in the care of Avery Earl.    Past Medical History:   Diagnosis Date    Arthritis     Cardiomyopathy     Ischemic    CHF (congestive heart failure)     Coronary " artery disease     Dyslipidemia     Hyperlipidemia     Hypertension     Mitral insufficiency     Myocardial infarction     Peripheral arterial disease     Pneumonia 2021    Pulmonary hypertension      Past Surgical History:   Procedure Laterality Date    CORONARY ARTERY BYPASS GRAFT      [place] to LAD, saphenous vein graft to right PDA    INSERTION OF IMPLANTABLE CARDIOVERTER-DEFIBRILLATOR (ICD) GENERATOR WITH TWO EXISTING LEADS       No family history on file.  Social History     Socioeconomic History    Marital status: Unknown   Tobacco Use    Smoking status: Former Smoker     Types: Cigarettes     Quit date:      Years since quittin.2    Smokeless tobacco: Never Used   Substance and Sexual Activity    Alcohol use: Not Currently    Drug use: Never      Review of patient's allergies indicates:  No Known Allergies    Medications:    Current Facility-Administered Medications:     0.9%  NaCl infusion, , Intravenous, PRN, Jose Rafael Dyson MD, Last Rate: 5 mL/hr at 22, New Bag at 22    albuterol-ipratropium 2.5 mg-0.5 mg/3 mL nebulizer solution 3 mL, 3 mL, Nebulization, Q6H PRN, Maida Jamil NP    aluminum-magnesium hydroxide-simethicone 200-200-20 mg/5 mL suspension 30 mL, 30 mL, Oral, QID PRN, Maida Jamil NP    bumetanide tablet 1 mg, 1 mg, Oral, Daily, Maida Jamil NP, 1 mg at 22    melatonin tablet 6 mg, 6 mg, Oral, Nightly PRN, Maida Jamil NP, 6 mg at 22    naloxone 0.4 mg/mL injection 0.02 mg, 0.02 mg, Intravenous, PRN, Maida Jamil NP    ondansetron injection 4 mg, 4 mg, Intravenous, Q8H PRN, Maida Jamil NP    piperacillin-tazobactam 4.5 g in dextrose 5 % 100 mL IVPB (ready to mix system), 4.5 g, Intravenous, Q8H, Felisa Quintero MD, Stopped at 22 1321    potassium chloride SA CR tablet 20 mEq, 20 mEq, Oral, Daily, Maida Jamil NP, 20 mEq at 22 0920    prochlorperazine  injection Soln 5 mg, 5 mg, Intravenous, Q6H PRN, Maida Jamil, AUBRIE    sacubitriL-valsartan 49-51 mg per tablet 1 tablet, 1 tablet, Oral, BID, Maida Jamil NP, 1 tablet at 04/06/22 0919    simethicone chewable tablet 80 mg, 1 tablet, Oral, QID PRN, Maida Jamil, NP    sodium chloride 0.9% flush 10 mL, 10 mL, Intravenous, Q8H PRN, Maida Jamil NP    spironolactone tablet 25 mg, 25 mg, Oral, Daily, Maida Jamil NP, 25 mg at 04/06/22 0919    ROS:  Review of Systems   Constitutional: Positive for activity change, appetite change and fatigue.   HENT: Positive for dental problem.    Respiratory: Negative for shortness of breath.    Cardiovascular: Positive for leg swelling. Negative for chest pain.   Gastrointestinal: Positive for abdominal pain. Negative for diarrhea, nausea and vomiting.   Endocrine: Negative.    Genitourinary: Negative for difficulty urinating.   Neurological: Positive for weakness. Negative for light-headedness and headaches.   Psychiatric/Behavioral: Negative for confusion. The patient is not nervous/anxious.        OBJECTIVE:     Physical Exam:  Vitals: Temp: 98.3 °F (36.8 °C) (04/06/22 1459)  Pulse: 73 (04/06/22 1459)  Resp: 20 (04/06/22 1459)  BP: (!) 96/52 (04/06/22 1459)  SpO2: 100 % (04/06/22 1459)    Physical Exam  Constitutional:       General: He is not in acute distress.     Appearance: He is ill-appearing.   HENT:      Head: Normocephalic and atraumatic.      Mouth/Throat:      Mouth: Mucous membranes are moist.      Pharynx: Oropharynx is clear.   Eyes:      General: No scleral icterus.  Pulmonary:      Effort: No respiratory distress.   Abdominal:      Tenderness: There is abdominal tenderness.   Musculoskeletal:         General: Swelling present.   Skin:     General: Skin is warm and dry.      Coloration: Skin is jaundiced.   Neurological:      General: No focal deficit present.      Mental Status: He is alert and oriented to person, place, and time. Mental  status is at baseline.      Motor: Weakness present.   Psychiatric:         Mood and Affect: Mood normal.         Behavior: Behavior normal.         Thought Content: Thought content normal.         Judgment: Judgment normal.         Review of Symptoms    Symptom Assessment (ESAS 0-10 Scale)  Unable to complete assessment due to Other         Living Arrangements:  Lives in home and Lives with friend    Psychosocial/Cultural: Patient lives with his girlfriend Delmi; he also has two children involved in his care      Advance Directives:   [place]: No    Do Not Resuscitate Status: No    Medical Power of : Yes      Decision Making:  Patient answered questions and Family answered questions      Labs:  WBC   Date Value Ref Range Status   04/06/2022 5.14 3.90 - 12.70 K/uL Final     Hemoglobin   Date Value Ref Range Status   04/06/2022 9.0 (L) 14.0 - 18.0 g/dL Final     Hematocrit   Date Value Ref Range Status   04/06/2022 24.0 (L) 40.0 - 54.0 % Final     MCV   Date Value Ref Range Status   04/06/2022 76 (L) 82 - 98 fL Final     Platelets   Date Value Ref Range Status   04/06/2022 249 150 - 450 K/uL Final       BMP  Lab Results   Component Value Date     (L) 04/06/2022    K 3.4 (L) 04/06/2022    CL 99 04/06/2022    CO2 21 (L) 04/06/2022    BUN 14 04/06/2022    CREATININE 0.7 04/06/2022    CALCIUM 9.3 04/06/2022    ANIONGAP 11 04/06/2022    ESTGFRAFRICA >60 04/06/2022    EGFRNONAA >60 04/06/2022       Lab Results   Component Value Date    AST 80 (H) 04/06/2022    ALKPHOS 914 (H) 04/06/2022    BILITOT 18.1 (H) 04/06/2022       Albumin   Date Value Ref Range Status   04/06/2022 2.1 (L) 3.5 - 5.2 g/dL Final       Radiology:U/S: I have reviewed all pertinent results/findings within the past 24 hours:  US abdomen 04/05/2022     Impression:   3.6 cm solid mass seen at the level the head of the pancreas with significant ductal dilatation of the pancreatic duct measuring up to 11 mm.   Moderate intrahepatic and common  "bile duct dilatation. 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  Recommend ERCP with endoscopic ultrasound and tissue sampling.    Discussed case and visit details with GI       Medical decision making: HIGH based on high risk of death high risk medications poor prognosis management of more than one chronic illness in exacerbation or progression of disease    Plan required increased review of medication choice, interaction, dosing, frequency, and route due to patient complexity. Patient complexity increased by: high risk medication use    25 min ACP time spent discussing: code status, assessed patient specific goals and addressed the best way to achieve them, coordination of care and emotional support, formulating and communicating prognosis, exploring burden/ benefit of various approaches of treatment, inquired about existing or willingness to complete advance directive documents.        Alisson Dumont NP  Palliative Medicine    Portions of this note may have been created with voice recognition software. Occasional "wrong word" or "sound alike" substitutions may have occurred due to the inherent limitations of voice recognition software. Please, read the note carefully and recognize, using context, where substitutions have occurred.     "

## 2022-04-06 NOTE — PROGRESS NOTES
[facility] - Telemetry ([facility] Medicine  Progress Note    Patient Name: Avery Earl  MRN: [id number]  Patient Class: OP- Observation   Admission Date: 4/5/2022  Length of Stay: 0 days  Attending Physician: Jose Rafael Dyson MD  Primary Care Provider: Keven Cuadra DO        Subjective:     Principal Problem:Pancreatic mass        HPI:  Pt is a 67 yo male with pMHx of CABG, ischemic cardiomyopathy on continuous dobutamine infusion at home, DVT, PAD, HPL, severe mitral insuffiencey and pulmonary hypertension. He presents today due to dark, tea colored urine onset approx 2 weeks and scleral jaundice for approx 1.5 weeks. He endorses intermittent generalized abdominal pain and back pain. Within the last 3 months, pt has lost approx 30 pounds as he had all his teeth extracted due to possible cardiac procedure. He describes KIMBALL and bilateral lower leg swelling.   In the ED, temp 98, pulse 92, resp 14, B/P 86/55 and SpO2 100 %  Labs find H/H 9.5/25.6, Na 132, potassium 3.4, gluc 135, alk phos 977, total bilirubin 18.7 and AST 90 and ALT 62  Urine is tea colored and analysis reflects multiple abnormalities.  CXR - no acute cardiopulmonary findings  Abdominal US - 3.6 cm solid mass seen at the level the head of the pancreas with significant ductal dilatation of the pancreatic duct measuring up to 11 mm.   Moderate intrahepatic and common bile duct dilatation. 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  Recommend ERCP with endoscopic ultrasound and tissue sampling.  ED spoke with Dr. Miller - hold further anticoagulation - full consult pending by GI.   Cardiology to provide opinion regarding cardiac clearance given ischemic cardiomyopathy prior to ERCP.      Overview/Hospital Course:  Patient was placed into observation for suspected pancreatic malignancy. Patient is also on continuous dobutamine drip for ischemic cardiomyopathy. GI consulted-Anticoagulants  held and patient started on zosyn with plans for ERCP Friday. Cardiology consulted for clearance. Palliative care and Pastoral care consulted. Labs and vitals reviewed. Low K this morning- repleted. Continue pain control and IV ABX.       Interval History: NAEON. Pain controlled. Continue dobutamine,  IV ABX, hold anticoagulants. Awaiting cards recs.    Review of Systems   Constitutional:  Positive for unexpected weight change. Negative for chills, fatigue and fever.   HENT:  Positive for dental problem.    Eyes:  Negative for visual disturbance.   Respiratory:  Positive for shortness of breath. Negative for cough.    Cardiovascular:  Positive for leg swelling. Negative for chest pain and palpitations.   Gastrointestinal:  Positive for abdominal pain. Negative for diarrhea, nausea and vomiting.   Genitourinary:         Tea colored urine   Musculoskeletal:  Positive for back pain.   Skin:  Positive for rash.   Neurological:  Negative for dizziness, weakness and headaches.   Psychiatric/Behavioral: Negative.     Objective:     Vital Signs (Most Recent):  Temp: 99.2 °F (37.3 °C) (04/06/22 0744)  Pulse: 84 (04/06/22 0800)  Resp: 18 (04/06/22 0744)  BP: (!) 112/52 (04/06/22 0919)  SpO2: 97 % (04/06/22 0744)   Vital Signs (24h Range):  Temp:  [97.4 °F (36.3 °C)-99.2 °F (37.3 °C)] 99.2 °F (37.3 °C)  Pulse:  [68-87] 84  Resp:  [13-20] 18  SpO2:  [97 %-100 %] 97 %  BP: ()/(51-72) 112/52     Weight: 89.4 kg (197 lb 1.5 oz)  Body mass index is 25.31 kg/m².    Intake/Output Summary (Last 24 hours) at 4/6/2022 1006  Last data filed at 4/6/2022 1000  Gross per 24 hour   Intake 118 ml   Output --   Net 118 ml      Physical Exam  Vitals and nursing note reviewed.   Constitutional:       Appearance: He is well-developed. He is ill-appearing.   HENT:      Head: Normocephalic and atraumatic.      Nose: Nose normal.   Eyes:      General: Scleral icterus present.      Pupils: Pupils are equal, round, and reactive to light.    Cardiovascular:      Rate and Rhythm: Normal rate and regular rhythm.      Heart sounds: Murmur heard.     No friction rub. No gallop.   Pulmonary:      Effort: Pulmonary effort is normal.      Breath sounds: Normal breath sounds.   Abdominal:      General: Bowel sounds are normal. There is no distension.      Palpations: Abdomen is soft.      Tenderness: There is no abdominal tenderness. There is no guarding.   Musculoskeletal:         General: No tenderness. Normal range of motion.      Cervical back: Normal range of motion and neck supple.      Right lower leg: Edema present.      Left lower leg: Edema present.   Skin:     General: Skin is warm and dry.      Coloration: Skin is jaundiced.   Neurological:      Mental Status: He is alert and oriented to person, place, and time.   Psychiatric:         Behavior: Behavior normal.       Significant Labs: All pertinent labs within the past 24 hours have been reviewed.  Results for orders placed or performed during the hospital encounter of 04/05/22   CBC Auto Differential   Result Value Ref Range    WBC 4.79 3.90 - 12.70 K/uL    RBC 3.34 (L) 4.60 - 6.20 M/uL    Hemoglobin 9.5 (L) 14.0 - 18.0 g/dL    Hematocrit 25.6 (L) 40.0 - 54.0 %    MCV 77 (L) 82 - 98 fL    MCH 28.4 27.0 - 31.0 pg    MCHC 37.1 (H) 32.0 - 36.0 g/dL    RDW 16.4 (H) 11.5 - 14.5 %    Platelets 229 150 - 450 K/uL    MPV 9.3 9.2 - 12.9 fL    Immature Granulocytes 0.2 0.0 - 0.5 %    Gran # (ANC) 2.7 1.8 - 7.7 K/uL    Immature Grans (Abs) 0.01 0.00 - 0.04 K/uL    Lymph # 1.3 1.0 - 4.8 K/uL    Mono # 0.7 0.3 - 1.0 K/uL    Eos # 0.1 0.0 - 0.5 K/uL    Baso # 0.02 0.00 - 0.20 K/uL    nRBC 0 0 /100 WBC    Gran % 56.0 38.0 - 73.0 %    Lymph % 26.9 18.0 - 48.0 %    Mono % 14.0 4.0 - 15.0 %    Eosinophil % 2.5 0.0 - 8.0 %    Basophil % 0.4 0.0 - 1.9 %    Differential Method Automated    Ammonia   Result Value Ref Range    Ammonia 26 10 - 50 umol/L   Lipase   Result Value Ref Range    Lipase 17 4 - 60 U/L    Comprehensive Metabolic Panel   Result Value Ref Range    Sodium 132 (L) 136 - 145 mmol/L    Potassium 3.4 (L) 3.5 - 5.1 mmol/L    Chloride 99 95 - 110 mmol/L    CO2 22 (L) 23 - 29 mmol/L    Glucose 135 (H) 70 - 110 mg/dL    BUN 17 8 - 23 mg/dL    Creatinine 0.8 0.5 - 1.4 mg/dL    Calcium 9.6 8.7 - 10.5 mg/dL    Total Protein 6.6 6.0 - 8.4 g/dL    Albumin 2.4 (L) 3.5 - 5.2 g/dL    Total Bilirubin 18.7 (H) 0.1 - 1.0 mg/dL    Alkaline Phosphatase 977 (H) 55 - 135 U/L    AST 90 (H) 10 - 40 U/L    ALT 62 (H) 10 - 44 U/L    Anion Gap 11 8 - 16 mmol/L    eGFR if African American >60 >60 mL/min/1.73 m^2    eGFR if non African American >60 >60 mL/min/1.73 m^2   Urinalysis, Reflex to Urine Culture Urine, Clean Catch    Specimen: Urine   Result Value Ref Range    Specimen UA Urine, Clean Catch     Color, UA Brown (A) Yellow, Straw, Deysi    Appearance, UA Hazy (A) Clear    pH, UA 6.0 5.0 - 8.0    Specific Gravity, UA 1.025 1.005 - 1.030    Protein, UA 1+ (A) Negative    Glucose, UA Negative Negative    Ketones, UA Trace (A) Negative    Bilirubin (UA) 3+ (A) Negative    Occult Blood UA Negative Negative    Nitrite, UA Positive (A) Negative    Urobilinogen, UA 1.0 <2.0 EU/dL    Leukocytes, UA Trace (A) Negative   BNP   Result Value Ref Range     (H) 0 - 99 pg/mL   CK   Result Value Ref Range    CPK 25 20 - 200 U/L   Troponin I   Result Value Ref Range    Troponin I 0.010 0.000 - 0.026 ng/mL   Drug screen panel, in-house   Result Value Ref Range    Benzodiazepines Negative Negative    Methadone metabolites Negative Negative    Cocaine (Metab.) Negative Negative    Opiate Scrn, Ur Negative Negative    Barbiturate Screen, Ur Negative Negative    Amphetamine Screen, Ur Negative Negative    THC Negative Negative    Phencyclidine Negative Negative    Creatinine, Urine 177.2 23.0 - 375.0 mg/dL    Toxicology Information SEE COMMENT    Ethanol   Result Value Ref Range    Alcohol, Serum <10 <10 mg/dL   Salicylate Level   Result  Value Ref Range    Salicylate Lvl <5.0 (L) 15.0 - 30.0 mg/dL   Acetaminophen Level   Result Value Ref Range    Acetaminophen (Tylenol), Serum 3.0 (L) 10.0 - 20.0 ug/mL   Hepatitis C Antibody   Result Value Ref Range    Hepatitis C Ab Negative Negative   HCV Virus Hold Specimen   Result Value Ref Range    HEP C Virus Hold Specimen Hold for HCV sendout    COVID-19 Rapid Screening   Result Value Ref Range    SARS-CoV-2 RNA, Amplification, Qual Negative Negative   Urinalysis Microscopic   Result Value Ref Range    RBC, UA 3 0 - 4 /hpf    WBC, UA 5 0 - 5 /hpf    Bacteria Few (A) None-Occ /hpf    Hyaline Casts, UA 1 0-1/lpf /lpf    Granular Casts, UA 2 (A) None /lpf    Microscopic Comment SEE COMMENT    Ferritin   Result Value Ref Range    Ferritin 1,157 (H) 20.0 - 300.0 ng/mL   Folate   Result Value Ref Range    Folate 8.6 4.0 - 24.0 ng/mL   Iron and TIBC   Result Value Ref Range    Iron 113 45 - 160 ug/dL    Transferrin 176 (L) 200 - 375 mg/dL    TIBC 260 250 - 450 ug/dL    Saturated Iron 43 20 - 50 %   Transferrin   Result Value Ref Range    Transferrin 176 (L) 200 - 375 mg/dL   CBC with Automated Differential   Result Value Ref Range    WBC 5.14 3.90 - 12.70 K/uL    RBC 3.14 (L) 4.60 - 6.20 M/uL    Hemoglobin 9.0 (L) 14.0 - 18.0 g/dL    Hematocrit 24.0 (L) 40.0 - 54.0 %    MCV 76 (L) 82 - 98 fL    MCH 28.7 27.0 - 31.0 pg    MCHC 37.5 (H) 32.0 - 36.0 g/dL    RDW 16.2 (H) 11.5 - 14.5 %    Platelets 249 150 - 450 K/uL    MPV 9.8 9.2 - 12.9 fL    Immature Granulocytes 0.4 0.0 - 0.5 %    Gran # (ANC) 2.8 1.8 - 7.7 K/uL    Immature Grans (Abs) 0.02 0.00 - 0.04 K/uL    Lymph # 1.4 1.0 - 4.8 K/uL    Mono # 0.7 0.3 - 1.0 K/uL    Eos # 0.2 0.0 - 0.5 K/uL    Baso # 0.04 0.00 - 0.20 K/uL    nRBC 0 0 /100 WBC    Gran % 53.9 38.0 - 73.0 %    Lymph % 27.8 18.0 - 48.0 %    Mono % 13.8 4.0 - 15.0 %    Eosinophil % 3.3 0.0 - 8.0 %    Basophil % 0.8 0.0 - 1.9 %    Differential Method Automated    Comprehensive Metabolic Panel (CMP)    Result Value Ref Range    Sodium 131 (L) 136 - 145 mmol/L    Potassium 3.4 (L) 3.5 - 5.1 mmol/L    Chloride 99 95 - 110 mmol/L    CO2 21 (L) 23 - 29 mmol/L    Glucose 99 70 - 110 mg/dL    BUN 14 8 - 23 mg/dL    Creatinine 0.7 0.5 - 1.4 mg/dL    Calcium 9.3 8.7 - 10.5 mg/dL    Total Protein 6.1 6.0 - 8.4 g/dL    Albumin 2.1 (L) 3.5 - 5.2 g/dL    Total Bilirubin 18.1 (H) 0.1 - 1.0 mg/dL    Alkaline Phosphatase 914 (H) 55 - 135 U/L    AST 80 (H) 10 - 40 U/L    ALT 56 (H) 10 - 44 U/L    Anion Gap 11 8 - 16 mmol/L    eGFR if African American >60 >60 mL/min/1.73 m^2    eGFR if non African American >60 >60 mL/min/1.73 m^2   Echo   Result Value Ref Range    BSA 2.16 m2    TDI SEPTAL 0.05 m/s    LV LATERAL E/E' RATIO 11.20 m/s    LV SEPTAL E/E' RATIO 22.40 m/s    LA WIDTH 4.67 cm    IVC diameter 1.42 cm    Left Ventricular Outflow Tract Mean Velocity 0.38009371621 cm/s    Left Ventricular Outflow Tract Mean Gradient 1.07 mmHg    TDI LATERAL 0.10 m/s    LVIDd 6.40 (A) 3.5 - 6.0 cm    IVS 1.27 (A) 0.6 - 1.1 cm    Posterior Wall 1.33 (A) 0.6 - 1.1 cm    Ao root annulus 3.83 cm    LVIDs 6.19 (A) 2.1 - 4.0 cm    FS 3 28 - 44 %    LA volume 89.94 cm3    Sinus 3.67 cm    STJ 3.29 cm    Ascending aorta 3.17 cm    LV mass 389.02 g    LA size 4.15 cm    TAPSE 1.81 cm    Left Ventricle Relative Wall Thickness 0.42 cm    AV mean gradient 13 mmHg    AV valve area 1.11 cm2    AV Velocity Ratio 0.29     AV index (prosthetic) 0.33     MV valve area p 1/2 method 5.14 cm2    E/A ratio 3.29     Mean e' 0.08 m/s    E wave deceleration time 147.142771871825473 msec    IVRT 62.003529431017584 msec    LVOT diameter 2.07 cm    LVOT area 3.4 cm2    LVOT peak milan 0.68 m/s    LVOT peak VTI 14.20 cm    Ao peak milan 2.37 m/s    Ao VTI 43.0 cm    RVOT peak milan 0.71 m/s    RVOT peak VTI 10.4 cm    Mr max milan 4.84 m/s    LVOT stroke volume 47.76 cm3    AV peak gradient 22 mmHg    PV mean gradient 1.29 mmHg    E/E' ratio 14.93 m/s    MV Peak E Milan 1.12 m/s     TR Max Milan 3.33 m/s    MV stenosis pressure 1/2 time 42.429724081602111 ms    MV Peak A Milan 0.34 m/s    LV Systolic Volume 193.28 mL    LV Systolic Volume Index 89.5 mL/m2    LV Diastolic Volume 208.66 mL    LV Diastolic Volume Index 96.60 mL/m2    LA Volume Index 41.6 mL/m2    LV Mass Index 180 g/m2    Echo EF Estimated 7 %    RA Major Axis 5.42 cm    Left Atrium Minor Axis 4.67 cm    Left Atrium Major Axis 6.57 cm    Triscuspid Valve Regurgitation Peak Gradient 44 mmHg    RA Width 5.42 cm    Right Atrial Pressure (from IVC) 3 mmHg    EF 15 %    TV rest pulmonary artery pressure 47 mmHg       Significant Imaging: I have reviewed all pertinent imaging results/findings within the past 24 hours.  Imaging Results              US Abdomen Limited (Final result)  Result time 04/05/22 12:17:53      Final result by Lacho Hair MD (04/05/22 12:17:53)                   Impression:      3.6 cm solid mass seen at the level the head of the pancreas with significant ductal dilatation of the pancreatic duct measuring up to 11 mm.   Moderate intrahepatic and common bile duct dilatation. 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  Recommend ERCP with endoscopic ultrasound and tissue sampling.      Electronically signed by: Lacho Hair MD  Date:    04/05/2022  Time:    12:17               Narrative:    EXAMINATION:  US ABDOMEN LIMITED    CLINICAL HISTORY:  jaundice;    COMPARISON:  None    FINDINGS:  Limited right upper quadrant ultrasound performed.  The liver measures 18.5 cm in length and is homogeneous in echogenicity.  3.7 and 1.6 cm solid lesions are seen within the right hepatic lobe concerning for metastatic disease.  Portal vein is patent.  Common bile duct is dilated at 15 mm.  Moderate intrahepatic ductal dilatation.  Sludge is seen within the gallbladder.  Gallbladder is moderately distended.  No gallstones, gallbladder wall thickening, or focal tenderness  over the gallbladder.  3.6 cm solid mass seen at the level the head of the pancreas with significant ductal dilatation of the pancreatic duct measuring up to 11 mm.  The right kidney is normal in length measuring 12 cm.  14 mm cyst is seen within the right kidney.  No right sided hydronephrosis or renal masses identified.                                       X-Ray Chest AP Portable (Final result)  Result time 04/05/22 10:19:12      Final result by Lacho Hair MD (04/05/22 10:19:12)                   Impression:      No acute process seen.      Electronically signed by: Lacho Hair MD  Date:    04/05/2022  Time:    10:19               Narrative:    EXAMINATION:  XR CHEST AP PORTABLE    CLINICAL HISTORY:  weakness;    FINDINGS:  Single view of the chest.  No comparison.    Left-sided pacing wire noted.  Right-sided PICC line tip overlies the SVC.    Cardiac silhouette is mildly enlarged.  Aorta demonstrates atherosclerotic disease..  The lungs demonstrate no evidence of active disease.  No evidence of pleural effusion or pneumothorax.  Bones demonstrate scattered degenerative change.                                          Assessment/Plan:      * Pancreatic mass  ERCP warranted for biopsy - GI consulted  Hold Eliquis x 2 days  IV ABX  Pain Control    Microcytic anemia  Monitor CBC  Iron panel pending      On continuous oral anticoagulation  According to Cardiology - okay to hold oral anticoagulation for planned procedure and resume post operatively ASAP  According to Dr. Miller  - Eliquis will need to be held for 2 days      Pre-op evaluation    Patient who presents with obstructive jaundice/pancreatic head mass concerning for underlying malignancy  -ERCP warranted with tissue sampling  -Known history of severe MR/severe ICM/CHF, on chronic inotropic therapy  -Echo pending  -Recent LHC 3/22 at [facility] showed patent grafts  -High risk of tyra and post OP CV complications for any procedure given  co-morbidities/cardiac status    Ischemic cardiomyopathy  On dobutamine  Cards following  Cardiac monitoring      Coronary artery disease involving native coronary artery of native heart without angina pectoris  Hold ASA and eliquis  No STATIN due to elevated liver enzymes  Cards following      Chronic combined systolic and diastolic congestive heart failure  Continue Entresto  Patient is identified as having Combined Systolic and Diastolic heart failure that is Chronic. CHF is currently controlled. Latest ECHO performed and demonstrates- Results for orders placed during the hospital encounter of 04/05/22    Echo    Interpretation Summary  · The left ventricle is moderately enlarged with severely decreased systolic function.  · The estimated ejection fraction is 15%.  · Grade III left ventricular diastolic dysfunction.  · Normal right ventricular size with moderately reduced right ventricular systolic function.  · There is mild aortic valve stenosis.  · Mild to moderate tricuspid regurgitation.  · Moderate mitral regurgitation.  · Moderate left atrial enlargement.  · The estimated PA systolic pressure is 47 mmHg.  · There is pulmonary hypertension.  . Continue Bumex and aldactone and monitor clinical status closely. Monitor on telemetry. Patient is off CHF pathway.  Monitor strict Is&Os and daily weights.  Place on fluid restriction of 1.5 L. Continue to stress to patient importance of self efficacy and  on diet for CHF. Last BNP reviewed- and noted below   Recent Labs   Lab 04/05/22  1037   *           VTE Risk Mitigation (From admission, onward)         Ordered     IP VTE HIGH RISK PATIENT  Once         04/05/22 1452     Place sequential compression device  Until discontinued         04/05/22 1452     Reason for No Pharmacological VTE Prophylaxis  Once        Question:  Reasons:  Answer:  Physician Provided (leave comment)    04/05/22 1452                Discharge Planning   GERG:      Code Status:  Full Code   Is the patient medically ready for discharge?:     Reason for patient still in hospital (select all that apply): Treatment and Consult recommendations                     Yolanda Preston NP  Department of Hospital Medicine   [facility])

## 2022-04-06 NOTE — ACP (ADVANCE CARE PLANNING)
[facility] - Telemetry ([facility])  Palliative Care       Patient Name: Avery Earl  MRN: [id number]  Admission Date: 4/5/2022  Hospital Length of Stay: 0 days  Code Status: Full Code   Attending Provider: Jose Rafael Dyson MD  Palliative Care Provider: Alisson Dumont NP  Primary Care Physician: Keven Cuadra DO  Principal Problem:Pancreatic mass  Advance Care Planning     Power of   Met with patient and his s/o, Delmi, to assist with completion of a Healthcare Power of  following their visit with PM NP. Patient has decided to complete a HCPOA and has appointed his daughter, health care agent: Jovany Earl & health care agent number: [phone] . Patient lists his son (Avery Dietrich, [phone]) and his s/o (Delmi Mabry, [phone]) as alternative healthcare agents. HCPOA was scanned into Epic and original plus copies were returned to patient and his s/o. Patient denies any other needs or questions at this time.     MAYURI Toscano, [facility]  Palliative Medicine  [phone]

## 2022-04-06 NOTE — PLAN OF CARE
[facility] - Telemetry (Hospital)  Initial Discharge Assessment       Primary Care Provider: Keven Cuadra DO    Admission Diagnosis: CHF (congestive heart failure) [I50.9]  Weakness [R53.1]  Jaundice [R17]  Pancreatic mass [K86.89]  Chest pain [R07.9]    Admission Date: 4/5/2022  Expected Discharge Date:     Discharge Barriers Identified: None    Payor: [employer] / Plan: [employer] TOTAL CARE / Product Type: HMO /     Extended Emergency Contact Information  Primary Emergency Contact: Jovany Earl  Mobile Phone: [phone]  Relation: Daughter    Discharge Plan A: Home Health  Discharge Plan B: Home with family      [facility]  [place] - [address]  [address] [address]  Phone: [phone] Fax: [phone]      Initial Assessment (most recent)     Adult Discharge Assessment - 04/06/22 1244        Discharge Assessment    Assessment Type Discharge Planning Assessment     Confirmed/corrected address, phone number and insurance Yes     Confirmed Demographics Correct on Facesheet     Source of Information patient     Communicated GREG with patient/caregiver Date not available/Unable to determine     Lives With significant other     Do you expect to return to your current living situation? Yes     Do you have help at home or someone to help you manage your care at home? No     Prior to hospitilization cognitive status: Alert/Oriented     Current cognitive status: Alert/Oriented     Walking or Climbing Stairs Difficulty none     Dressing/Bathing Difficulty none     Home Layout Able to live on 1st floor     Equipment Currently Used at Home medication pump     Readmission within 30 days? No     Patient currently being followed by outpatient case management? No     Do you currently have service(s) that help you manage your care at home? Yes     Name and Contact number of agency [facility]     Is the pt/caregiver preference to resume services with current agency Yes      Do you take prescription medications? Yes     Do you have prescription coverage? Yes     Do you have any problems affording any of your prescribed medications? No     Is the patient taking medications as prescribed? yes     Who is going to help you get home at discharge? Self v SO     How do you get to doctors appointments? car, drives self     Are you on dialysis? No     Do you take coumadin? No     Discharge Plan A Home Health     Discharge Plan B Home with family     Discharge Plan discussed with: Patient     Discharge Barriers Identified None               Met with pt at bedside for DC assessment. Pt lives at home with his girlfriend and has a medication pump for home dobutamine. Pt may benefit from HH if appropriate on DC for PICC . SWer provided a transitional care folder, information on advanced directives, information on pharmacy bedside delivery, and discharge planning begins on admission with contact information for any needs/questions.    Teddy Roque LMSW 4/6/2022 12:48 PM

## 2022-04-06 NOTE — SUBJECTIVE & OBJECTIVE
Interval History: NAEON. Pain controlled. Continue dobutamine,  IV ABX, hold anticoagulants. Awaiting cards recs.    Review of Systems   Constitutional:  Positive for unexpected weight change. Negative for chills, fatigue and fever.   HENT:  Positive for dental problem.    Eyes:  Negative for visual disturbance.   Respiratory:  Positive for shortness of breath. Negative for cough.    Cardiovascular:  Positive for leg swelling. Negative for chest pain and palpitations.   Gastrointestinal:  Positive for abdominal pain. Negative for diarrhea, nausea and vomiting.   Genitourinary:         Tea colored urine   Musculoskeletal:  Positive for back pain.   Skin:  Positive for rash.   Neurological:  Negative for dizziness, weakness and headaches.   Psychiatric/Behavioral: Negative.     Objective:     Vital Signs (Most Recent):  Temp: 99.2 °F (37.3 °C) (04/06/22 0744)  Pulse: 84 (04/06/22 0800)  Resp: 18 (04/06/22 0744)  BP: (!) 112/52 (04/06/22 0919)  SpO2: 97 % (04/06/22 0744)   Vital Signs (24h Range):  Temp:  [97.4 °F (36.3 °C)-99.2 °F (37.3 °C)] 99.2 °F (37.3 °C)  Pulse:  [68-87] 84  Resp:  [13-20] 18  SpO2:  [97 %-100 %] 97 %  BP: ()/(51-72) 112/52     Weight: 89.4 kg (197 lb 1.5 oz)  Body mass index is 25.31 kg/m².    Intake/Output Summary (Last 24 hours) at 4/6/2022 1006  Last data filed at 4/6/2022 1000  Gross per 24 hour   Intake 118 ml   Output --   Net 118 ml      Physical Exam  Vitals and nursing note reviewed.   Constitutional:       Appearance: He is well-developed. He is ill-appearing.   HENT:      Head: Normocephalic and atraumatic.      Nose: Nose normal.   Eyes:      General: Scleral icterus present.      Pupils: Pupils are equal, round, and reactive to light.   Cardiovascular:      Rate and Rhythm: Normal rate and regular rhythm.      Heart sounds: Murmur heard.     No friction rub. No gallop.   Pulmonary:      Effort: Pulmonary effort is normal.      Breath sounds: Normal breath sounds.   Abdominal:       General: Bowel sounds are normal. There is no distension.      Palpations: Abdomen is soft.      Tenderness: There is no abdominal tenderness. There is no guarding.   Musculoskeletal:         General: No tenderness. Normal range of motion.      Cervical back: Normal range of motion and neck supple.      Right lower leg: Edema present.      Left lower leg: Edema present.   Skin:     General: Skin is warm and dry.      Coloration: Skin is jaundiced.   Neurological:      Mental Status: He is alert and oriented to person, place, and time.   Psychiatric:         Behavior: Behavior normal.       Significant Labs: All pertinent labs within the past 24 hours have been reviewed.  Results for orders placed or performed during the hospital encounter of 04/05/22   CBC Auto Differential   Result Value Ref Range    WBC 4.79 3.90 - 12.70 K/uL    RBC 3.34 (L) 4.60 - 6.20 M/uL    Hemoglobin 9.5 (L) 14.0 - 18.0 g/dL    Hematocrit 25.6 (L) 40.0 - 54.0 %    MCV 77 (L) 82 - 98 fL    MCH 28.4 27.0 - 31.0 pg    MCHC 37.1 (H) 32.0 - 36.0 g/dL    RDW 16.4 (H) 11.5 - 14.5 %    Platelets 229 150 - 450 K/uL    MPV 9.3 9.2 - 12.9 fL    Immature Granulocytes 0.2 0.0 - 0.5 %    Gran # (ANC) 2.7 1.8 - 7.7 K/uL    Immature Grans (Abs) 0.01 0.00 - 0.04 K/uL    Lymph # 1.3 1.0 - 4.8 K/uL    Mono # 0.7 0.3 - 1.0 K/uL    Eos # 0.1 0.0 - 0.5 K/uL    Baso # 0.02 0.00 - 0.20 K/uL    nRBC 0 0 /100 WBC    Gran % 56.0 38.0 - 73.0 %    Lymph % 26.9 18.0 - 48.0 %    Mono % 14.0 4.0 - 15.0 %    Eosinophil % 2.5 0.0 - 8.0 %    Basophil % 0.4 0.0 - 1.9 %    Differential Method Automated    Ammonia   Result Value Ref Range    Ammonia 26 10 - 50 umol/L   Lipase   Result Value Ref Range    Lipase 17 4 - 60 U/L   Comprehensive Metabolic Panel   Result Value Ref Range    Sodium 132 (L) 136 - 145 mmol/L    Potassium 3.4 (L) 3.5 - 5.1 mmol/L    Chloride 99 95 - 110 mmol/L    CO2 22 (L) 23 - 29 mmol/L    Glucose 135 (H) 70 - 110 mg/dL    BUN 17 8 - 23 mg/dL     Creatinine 0.8 0.5 - 1.4 mg/dL    Calcium 9.6 8.7 - 10.5 mg/dL    Total Protein 6.6 6.0 - 8.4 g/dL    Albumin 2.4 (L) 3.5 - 5.2 g/dL    Total Bilirubin 18.7 (H) 0.1 - 1.0 mg/dL    Alkaline Phosphatase 977 (H) 55 - 135 U/L    AST 90 (H) 10 - 40 U/L    ALT 62 (H) 10 - 44 U/L    Anion Gap 11 8 - 16 mmol/L    eGFR if African American >60 >60 mL/min/1.73 m^2    eGFR if non African American >60 >60 mL/min/1.73 m^2   Urinalysis, Reflex to Urine Culture Urine, Clean Catch    Specimen: Urine   Result Value Ref Range    Specimen UA Urine, Clean Catch     Color, UA Brown (A) Yellow, Straw, Deysi    Appearance, UA Hazy (A) Clear    pH, UA 6.0 5.0 - 8.0    Specific Gravity, UA 1.025 1.005 - 1.030    Protein, UA 1+ (A) Negative    Glucose, UA Negative Negative    Ketones, UA Trace (A) Negative    Bilirubin (UA) 3+ (A) Negative    Occult Blood UA Negative Negative    Nitrite, UA Positive (A) Negative    Urobilinogen, UA 1.0 <2.0 EU/dL    Leukocytes, UA Trace (A) Negative   BNP   Result Value Ref Range     (H) 0 - 99 pg/mL   CK   Result Value Ref Range    CPK 25 20 - 200 U/L   Troponin I   Result Value Ref Range    Troponin I 0.010 0.000 - 0.026 ng/mL   Drug screen panel, in-house   Result Value Ref Range    Benzodiazepines Negative Negative    Methadone metabolites Negative Negative    Cocaine (Metab.) Negative Negative    Opiate Scrn, Ur Negative Negative    Barbiturate Screen, Ur Negative Negative    Amphetamine Screen, Ur Negative Negative    THC Negative Negative    Phencyclidine Negative Negative    Creatinine, Urine 177.2 23.0 - 375.0 mg/dL    Toxicology Information SEE COMMENT    Ethanol   Result Value Ref Range    Alcohol, Serum <10 <10 mg/dL   Salicylate Level   Result Value Ref Range    Salicylate Lvl <5.0 (L) 15.0 - 30.0 mg/dL   Acetaminophen Level   Result Value Ref Range    Acetaminophen (Tylenol), Serum 3.0 (L) 10.0 - 20.0 ug/mL   Hepatitis C Antibody   Result Value Ref Range    Hepatitis C Ab Negative  Negative   HCV Virus Hold Specimen   Result Value Ref Range    HEP C Virus Hold Specimen Hold for HCV sendout    COVID-19 Rapid Screening   Result Value Ref Range    SARS-CoV-2 RNA, Amplification, Qual Negative Negative   Urinalysis Microscopic   Result Value Ref Range    RBC, UA 3 0 - 4 /hpf    WBC, UA 5 0 - 5 /hpf    Bacteria Few (A) None-Occ /hpf    Hyaline Casts, UA 1 0-1/lpf /lpf    Granular Casts, UA 2 (A) None /lpf    Microscopic Comment SEE COMMENT    Ferritin   Result Value Ref Range    Ferritin 1,157 (H) 20.0 - 300.0 ng/mL   Folate   Result Value Ref Range    Folate 8.6 4.0 - 24.0 ng/mL   Iron and TIBC   Result Value Ref Range    Iron 113 45 - 160 ug/dL    Transferrin 176 (L) 200 - 375 mg/dL    TIBC 260 250 - 450 ug/dL    Saturated Iron 43 20 - 50 %   Transferrin   Result Value Ref Range    Transferrin 176 (L) 200 - 375 mg/dL   CBC with Automated Differential   Result Value Ref Range    WBC 5.14 3.90 - 12.70 K/uL    RBC 3.14 (L) 4.60 - 6.20 M/uL    Hemoglobin 9.0 (L) 14.0 - 18.0 g/dL    Hematocrit 24.0 (L) 40.0 - 54.0 %    MCV 76 (L) 82 - 98 fL    MCH 28.7 27.0 - 31.0 pg    MCHC 37.5 (H) 32.0 - 36.0 g/dL    RDW 16.2 (H) 11.5 - 14.5 %    Platelets 249 150 - 450 K/uL    MPV 9.8 9.2 - 12.9 fL    Immature Granulocytes 0.4 0.0 - 0.5 %    Gran # (ANC) 2.8 1.8 - 7.7 K/uL    Immature Grans (Abs) 0.02 0.00 - 0.04 K/uL    Lymph # 1.4 1.0 - 4.8 K/uL    Mono # 0.7 0.3 - 1.0 K/uL    Eos # 0.2 0.0 - 0.5 K/uL    Baso # 0.04 0.00 - 0.20 K/uL    nRBC 0 0 /100 WBC    Gran % 53.9 38.0 - 73.0 %    Lymph % 27.8 18.0 - 48.0 %    Mono % 13.8 4.0 - 15.0 %    Eosinophil % 3.3 0.0 - 8.0 %    Basophil % 0.8 0.0 - 1.9 %    Differential Method Automated    Comprehensive Metabolic Panel (CMP)   Result Value Ref Range    Sodium 131 (L) 136 - 145 mmol/L    Potassium 3.4 (L) 3.5 - 5.1 mmol/L    Chloride 99 95 - 110 mmol/L    CO2 21 (L) 23 - 29 mmol/L    Glucose 99 70 - 110 mg/dL    BUN 14 8 - 23 mg/dL    Creatinine 0.7 0.5 - 1.4 mg/dL     Calcium 9.3 8.7 - 10.5 mg/dL    Total Protein 6.1 6.0 - 8.4 g/dL    Albumin 2.1 (L) 3.5 - 5.2 g/dL    Total Bilirubin 18.1 (H) 0.1 - 1.0 mg/dL    Alkaline Phosphatase 914 (H) 55 - 135 U/L    AST 80 (H) 10 - 40 U/L    ALT 56 (H) 10 - 44 U/L    Anion Gap 11 8 - 16 mmol/L    eGFR if African American >60 >60 mL/min/1.73 m^2    eGFR if non African American >60 >60 mL/min/1.73 m^2   Echo   Result Value Ref Range    BSA 2.16 m2    TDI SEPTAL 0.05 m/s    LV LATERAL E/E' RATIO 11.20 m/s    LV SEPTAL E/E' RATIO 22.40 m/s    LA WIDTH 4.67 cm    IVC diameter 1.42 cm    Left Ventricular Outflow Tract Mean Velocity 0.39765056964 cm/s    Left Ventricular Outflow Tract Mean Gradient 1.07 mmHg    TDI LATERAL 0.10 m/s    LVIDd 6.40 (A) 3.5 - 6.0 cm    IVS 1.27 (A) 0.6 - 1.1 cm    Posterior Wall 1.33 (A) 0.6 - 1.1 cm    Ao root annulus 3.83 cm    LVIDs 6.19 (A) 2.1 - 4.0 cm    FS 3 28 - 44 %    LA volume 89.94 cm3    Sinus 3.67 cm    STJ 3.29 cm    Ascending aorta 3.17 cm    LV mass 389.02 g    LA size 4.15 cm    TAPSE 1.81 cm    Left Ventricle Relative Wall Thickness 0.42 cm    AV mean gradient 13 mmHg    AV valve area 1.11 cm2    AV Velocity Ratio 0.29     AV index (prosthetic) 0.33     MV valve area p 1/2 method 5.14 cm2    E/A ratio 3.29     Mean e' 0.08 m/s    E wave deceleration time 147.338243562154677 msec    IVRT 62.368891564204601 msec    LVOT diameter 2.07 cm    LVOT area 3.4 cm2    LVOT peak milan 0.68 m/s    LVOT peak VTI 14.20 cm    Ao peak milan 2.37 m/s    Ao VTI 43.0 cm    RVOT peak milan 0.71 m/s    RVOT peak VTI 10.4 cm    Mr max milan 4.84 m/s    LVOT stroke volume 47.76 cm3    AV peak gradient 22 mmHg    PV mean gradient 1.29 mmHg    E/E' ratio 14.93 m/s    MV Peak E [place] 1.12 m/s    TR Max [place] 3.33 m/s    MV stenosis pressure 1/2 time 42.352490132725580 ms    MV Peak A [place] 0.34 m/s    LV Systolic Volume 193.28 mL    LV Systolic Volume Index 89.5 mL/m2    LV Diastolic Volume 208.66 mL    LV Diastolic Volume Index 96.60  mL/m2    LA Volume Index 41.6 mL/m2    LV Mass Index 180 g/m2    Echo EF Estimated 7 %    RA Major Axis 5.42 cm    Left Atrium Minor Axis 4.67 cm    Left Atrium Major Axis 6.57 cm    Triscuspid Valve Regurgitation Peak Gradient 44 mmHg    RA Width 5.42 cm    Right Atrial Pressure (from IVC) 3 mmHg    EF 15 %    TV rest pulmonary artery pressure 47 mmHg       Significant Imaging: I have reviewed all pertinent imaging results/findings within the past 24 hours.  Imaging Results              US Abdomen Limited (Final result)  Result time 04/05/22 12:17:53      Final result by Lacho Hair MD (04/05/22 12:17:53)                   Impression:      3.6 cm solid mass seen at the level the head of the pancreas with significant ductal dilatation of the pancreatic duct measuring up to 11 mm.   Moderate intrahepatic and common bile duct dilatation. 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  Recommend ERCP with endoscopic ultrasound and tissue sampling.      Electronically signed by: Lacho Hair MD  Date:    04/05/2022  Time:    12:17               Narrative:    EXAMINATION:  US ABDOMEN LIMITED    CLINICAL HISTORY:  jaundice;    COMPARISON:  None    FINDINGS:  Limited right upper quadrant ultrasound performed.  The liver measures 18.5 cm in length and is homogeneous in echogenicity.  3.7 and 1.6 cm solid lesions are seen within the right hepatic lobe concerning for metastatic disease.  Portal vein is patent.  Common bile duct is dilated at 15 mm.  Moderate intrahepatic ductal dilatation.  Sludge is seen within the gallbladder.  Gallbladder is moderately distended.  No gallstones, gallbladder wall thickening, or focal tenderness over the gallbladder.  3.6 cm solid mass seen at the level the head of the pancreas with significant ductal dilatation of the pancreatic duct measuring up to 11 mm.  The right kidney is normal in length measuring 12 cm.  14 mm cyst is seen within  the right kidney.  No right sided hydronephrosis or renal masses identified.                                       X-Ray Chest AP Portable (Final result)  Result time 04/05/22 10:19:12      Final result by Lacho Hair MD (04/05/22 10:19:12)                   Impression:      No acute process seen.      Electronically signed by: Lacho Hair MD  Date:    04/05/2022  Time:    10:19               Narrative:    EXAMINATION:  XR CHEST AP PORTABLE    CLINICAL HISTORY:  weakness;    FINDINGS:  Single view of the chest.  No comparison.    Left-sided pacing wire noted.  Right-sided PICC line tip overlies the SVC.    Cardiac silhouette is mildly enlarged.  Aorta demonstrates atherosclerotic disease..  The lungs demonstrate no evidence of active disease.  No evidence of pleural effusion or pneumothorax.  Bones demonstrate scattered degenerative change.

## 2022-04-06 NOTE — PLAN OF CARE
Pt AAOx4 .POC reviewed with pt. Pt verbalized understanding   Pt remains free of injuries and falls; fall precaution in place   Dobutamine Drip 3.4mL/hr from home [facility] PICC  IV Zosyn Q8H  No C/O of pain  Room  air  possible EUS and ERCP once he is safely off anticoagulation per Gastro  Bed low, side rails up x2, non slip socks in use, call light in reach   Reminded to call for assistance  Hourly rounding complete will continue to monitor

## 2022-04-06 NOTE — HOSPITAL COURSE
Patient was placed into observation for suspected pancreatic malignancy. Patient is also on continuous dobutamine drip for ischemic cardiomyopathy. GI consulted-Anticoagulants held and patient started on zosyn with plans for ERCP Friday. Cardiology consulted for clearance. Palliative care and Pastoral care consulted. Labs and vitals reviewed. Low K this morning- repleted. Continue pain control and IV ABX.   04/07/2022  Labs and vitals reviewed. Family at bedside today. Assisted patient with setting up [employer] account. Appears saddened, flat affect, ready to have some answers. Low BP this morning that improved after IVF. Continue to monitor closely. ERCP planned for tomorrow- scheduled for 1400. NPO after midnight.   04/08: Patient had ERCP this AM. He will be monitored overnight and will dc home tomorrow if stable with OP f/u with Heme/onc. Oral anticoag will be restarted tomorrow morning. Admitted to inpatient. Home health requesting Dobutamine infusion to milrinone infusion due to Dobutamine shortage. Cardiology notified.    4/9- looks and feels much better, comfortable. Last evening he was started on Milrinone gtt but dropped his BP soon which remained low 60-70 systolic while on the infusion, eventually Dr. Remy ordered it stopped and resumed Dobutamine gtt at the same old dose which he tolerated well and his BP improved. He feels much better now, and wishes to go home. His home Infusion team is trying to arrange for Dobutamine gtt to be delivered here and once that happens, he may be discharged home this evening. He is eating drinking better. Liver Bx may be back next week. [facility] was able to bring his Dobutamine infusion to his room and he was hooked up with it. He felt and fine and was ready for discharge. He was seen and examined and deemed stable for discharge home today.

## 2022-04-06 NOTE — PROGRESS NOTES
"[facility] - Telemetry ([facility])  Cardiology  Progress Note    Patient Name: Avery Earl  MRN: [id number]  Admission Date: 4/5/2022  Hospital Length of Stay: 0 days  Code Status: Full Code   Attending Physician: Jose Rafael Dyson MD   Primary Care Physician: Keven Cuadra DO  Expected Discharge Date:   Principal Problem:Pancreatic mass    Subjective:     HPI:  Mr. Earl is a 66 year old male patient whose current medical conditions include chronic systolic CHF (on chronic dobutamine infusion/inotropic therapy), CAD s/p CABG x 2, s/p AICD, PAD, and severe MR who was sent to [facility] ED from cardiology clinic due to jaundice that onset two weeks ago. Patient denied any associated fever, chills, abdominal pain, SOB, or chest pain. 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 Cardiology consulted for pre-op risk assessment. Patient seen and examined today, resting in bed. Feels weak. Reports decreased appetite and weight loss recently along with "pale stools". Chronic KIMBALL, but does not seem to be worse than his norm. No chest pain/angina. He reports compliance with his medications, on Eliquis as OP for unclear reasons (patient states he believes it is due to prior DVT). Recently had LHC at [facility] which showed patent grafts (LIMA to LAD, SVG to RCA) in 3/22./Previously deemed not to be a suitable for candidate for MitralClip or advanced options (LVAD). He has been evaluated by several cardiologists, Dr. Funes, Dr. Barnhart, and Dr. Simpson, most recently was seen by Dr. Infante. Echo pending.  Hospital Course:   4/6/22:  Pt seen and examined this am. CV status seemed stable. No angina. On home dobutamine.  No acute CHF sxs. Awaiting GI workup for " pancreatic mass, possible malignancy. Labs reviewed.          Review of Systems   Constitutional: Positive for malaise/fatigue.   HENT: Negative.     Eyes: Negative.    Cardiovascular:  Positive for dyspnea on exertion.   Respiratory:  Positive for shortness of breath.    Endocrine: Negative.    Hematologic/Lymphatic: Negative.    Skin: Negative.    Musculoskeletal: Negative.    Gastrointestinal:  Positive for jaundice.   Genitourinary: Negative.    Neurological:  Positive for weakness.   Psychiatric/Behavioral: Negative.     Allergic/Immunologic: Negative.    Objective:     Vital Signs (Most Recent):  Temp: 98.4 °F (36.9 °C) (04/06/22 1109)  Pulse: 81 (04/06/22 1109)  Resp: 20 (04/06/22 1109)  BP: (!) 100/56 (04/06/22 1109)  SpO2: 99 % (04/06/22 1109)   Vital Signs (24h Range):  Temp:  [97.4 °F (36.3 °C)-99.2 °F (37.3 °C)] 98.4 °F (36.9 °C)  Pulse:  [68-87] 81  Resp:  [13-20] 20  SpO2:  [97 %-100 %] 99 %  BP: ()/(51-72) 100/56     Weight: 89.4 kg (197 lb 1.5 oz)  Body mass index is 25.31 kg/m².     SpO2: 99 %  O2 Device (Oxygen Therapy): room air      Intake/Output Summary (Last 24 hours) at 4/6/2022 1141  Last data filed at 4/6/2022 1000  Gross per 24 hour   Intake 118 ml   Output --   Net 118 ml       Lines/Drains/Airways       Peripherally Inserted Central Catheter Line  Duration             PICC Double Lumen right brachial -- days                    Physical Exam  Vitals and nursing note reviewed.   Constitutional:       Appearance: He is well-developed. He is ill-appearing.   HENT:      Head: Normocephalic.   Neck:      Vascular: No carotid bruit or JVD.   Cardiovascular:      Rate and Rhythm: Normal rate and regular rhythm.      Pulses: No midsystolic click and no opening snap.           Radial pulses are 2+ on the right side and 2+ on the left side.      Heart sounds: S1 normal and S2 normal. Heart sounds not distant. Murmur heard.   Medium-pitched midsystolic murmur is present with a grade of 2/6 at  the upper left sternal border.     No friction rub. No S3 or S4 sounds.   Pulmonary:      Effort: Pulmonary effort is normal.      Breath sounds: Normal breath sounds. No wheezing or rales.   Abdominal:      General: Bowel sounds are decreased. There is no distension.      Palpations: Abdomen is soft.      Tenderness: There is no abdominal tenderness.   Musculoskeletal:      Cervical back: Neck supple.   Skin:     General: Skin is warm.   Neurological:      Mental Status: He is alert and oriented to person, place, and time.   Psychiatric:         Behavior: Behavior normal. Behavior is cooperative.       Significant Labs: ABG: No results for input(s): PH, PCO2, HCO3, POCSATURATED, BE in the last 48 hours., Blood Culture: No results for input(s): LABBLOO in the last 48 hours., BMP:   Recent Labs   Lab 04/05/22  1037 04/06/22  0346   * 99   * 131*   K 3.4* 3.4*   CL 99 99   CO2 22* 21*   BUN 17 14   CREATININE 0.8 0.7   CALCIUM 9.6 9.3   , CMP   Recent Labs   Lab 04/05/22  1037 04/06/22  0346   * 131*   K 3.4* 3.4*   CL 99 99   CO2 22* 21*   * 99   BUN 17 14   CREATININE 0.8 0.7   CALCIUM 9.6 9.3   PROT 6.6 6.1   ALBUMIN 2.4* 2.1*   BILITOT 18.7* 18.1*   ALKPHOS 977* 914*   AST 90* 80*   ALT 62* 56*   ANIONGAP 11 11   ESTGFRAFRICA >60 >60   EGFRNONAA >60 >60   , CBC   Recent Labs   Lab 04/05/22  1037 04/06/22  0346   WBC 4.79 5.14   HGB 9.5* 9.0*   HCT 25.6* 24.0*    249   , INR No results for input(s): INR, PROTIME in the last 48 hours., Lipid Panel No results for input(s): CHOL, HDL, LDLCALC, TRIG, CHOLHDL in the last 48 hours., and Troponin   Recent Labs   Lab 04/05/22  1037   TROPONINI 0.010       Significant Imaging: Echocardiogram: Transthoracic echo (TTE) complete ([facility] Only):   Results for orders placed or performed during the hospital encounter of 04/05/22   Echo   Result Value Ref Range    BSA 2.16 m2    TDI SEPTAL 0.05 m/s    LV LATERAL E/E' RATIO 11.20 m/s    LV SEPTAL E/E'  RATIO 22.40 m/s    LA WIDTH 4.67 cm    IVC diameter 1.42 cm    Left Ventricular Outflow Tract Mean Velocity 0.11006947602 cm/s    Left Ventricular Outflow Tract Mean Gradient 1.07 mmHg    TDI LATERAL 0.10 m/s    LVIDd 6.40 (A) 3.5 - 6.0 cm    IVS 1.27 (A) 0.6 - 1.1 cm    Posterior Wall 1.33 (A) 0.6 - 1.1 cm    Ao root annulus 3.83 cm    LVIDs 6.19 (A) 2.1 - 4.0 cm    FS 3 28 - 44 %    LA volume 89.94 cm3    Sinus 3.67 cm    STJ 3.29 cm    Ascending aorta 3.17 cm    LV mass 389.02 g    LA size 4.15 cm    TAPSE 1.81 cm    Left Ventricle Relative Wall Thickness 0.42 cm    AV mean gradient 13 mmHg    AV valve area 1.11 cm2    AV Velocity Ratio 0.29     AV index (prosthetic) 0.33     MV valve area p 1/2 method 5.14 cm2    E/A ratio 3.29     Mean e' 0.08 m/s    E wave deceleration time 147.809173195282893 msec    IVRT 62.347741277231865 msec    LVOT diameter 2.07 cm    LVOT area 3.4 cm2    LVOT peak milan 0.68 m/s    LVOT peak VTI 14.20 cm    Ao peak milan 2.37 m/s    Ao VTI 43.0 cm    RVOT peak milan 0.71 m/s    RVOT peak VTI 10.4 cm    Mr max milan 4.84 m/s    LVOT stroke volume 47.76 cm3    AV peak gradient 22 mmHg    PV mean gradient 1.29 mmHg    E/E' ratio 14.93 m/s    MV Peak E Milan 1.12 m/s    TR Max Milan 3.33 m/s    MV stenosis pressure 1/2 time 42.648418142042821 ms    MV Peak A [place] 0.34 m/s    LV Systolic Volume 193.28 mL    LV Systolic Volume Index 89.5 mL/m2    LV Diastolic Volume 208.66 mL    LV Diastolic Volume Index 96.60 mL/m2    LA Volume Index 41.6 mL/m2    LV Mass Index 180 g/m2    Echo EF Estimated 7 %    RA Major Axis 5.42 cm    Left Atrium Minor Axis 4.67 cm    Left Atrium Major Axis 6.57 cm    Triscuspid Valve Regurgitation Peak Gradient 44 mmHg    RA Width 5.42 cm    Right Atrial Pressure (from IVC) 3 mmHg    EF 15 %    TV rest pulmonary artery pressure 47 mmHg    Narrative    · The left ventricle is moderately enlarged with severely decreased   systolic function.  · The estimated ejection fraction is  15%.  · Grade III left ventricular diastolic dysfunction.  · Normal right ventricular size with moderately reduced right ventricular   systolic function.  · There is mild aortic valve stenosis.  · Mild to moderate tricuspid regurgitation.  · Moderate mitral regurgitation.  · Moderate left atrial enlargement.  · The estimated PA systolic pressure is 47 mmHg.  · There is pulmonary hypertension.        Assessment and Plan:         On continuous oral anticoagulation  -Patient on Eliquis as OP for possible DVT  -Ok to hold for planned procedure and resume post-operatively ASAP    Pre-op evaluation  -Patient who presents with obstructive jaundice/pancreatic head mass concerning for underlying malignancy  -ERCP warranted with tissue sampling  -Known history of severe MR/severe ICM/CHF, on chronic inotropic therapy  -Echo pending  -Recent LHC 3/22 at [facility] showed patent grafts  -High risk of tyra and post OP CV complications for any procedure given co-morbidities/cardiac status    Ischemic cardiomyopathy  -Stable, compensated, on chronic dobutamine infusion  -Continue Bumex, Entresto, Aldactone as tolerated  -No BB given inotropic support    Coronary artery disease involving native coronary artery of native heart without angina pectoris  -No angina  -s/p recent LHC in 3/22 at [facility] which showed patent grafts  -Hold ASA/Plavix given impending procedure, resume post-op ASAP  -No statin given jaundice    Nonrheumatic mitral valve regurgitation  -Known severe MR, previously turned down for Mitral clip  -Repeat echo pending    Chronic combined systolic and diastolic congestive heart failure  -Compensated  -Echo pending  -Continue Buemex, Aldactone, Entresto as tolerated  -No BB given inotropic support        VTE Risk Mitigation (From admission, onward)         Ordered     IP VTE HIGH RISK PATIENT  Once         04/05/22 1452     Place sequential compression device  Until discontinued         04/05/22 1452     Reason for No  Pharmacological VTE Prophylaxis  Once        Question:  Reasons:  Answer:  Physician Provided (leave comment)    04/05/22 3339                Nathaniel Remy MD  Cardiology  [facility] - Telemetry ([facility])

## 2022-04-06 NOTE — CHAPLAIN
Initial visit with patient.  Visited with patient to assess for spiritual and emotional needs.  Pt was doing okay at the time of my visit. He did take time to talk some about what is going on with his health and overall he is staying strong.  Pt asked for prayer and I took time to do this before leaving.  Spiritual care remains available as needed.    Chaplain Caleb Keating M.Div., [facility]

## 2022-04-07 LAB
ALBUMIN SERPL BCP-MCNC: 2 G/DL (ref 3.5–5.2)
ALP SERPL-CCNC: 936 U/L (ref 55–135)
ALT SERPL W/O P-5'-P-CCNC: 52 U/L (ref 10–44)
ANION GAP SERPL CALC-SCNC: 10 MMOL/L (ref 8–16)
AST SERPL-CCNC: 73 U/L (ref 10–40)
BASOPHILS # BLD AUTO: 0.04 K/UL (ref 0–0.2)
BASOPHILS NFR BLD: 0.8 % (ref 0–1.9)
BILIRUB SERPL-MCNC: 19.5 MG/DL (ref 0.1–1)
BUN SERPL-MCNC: 13 MG/DL (ref 8–23)
CALCIUM SERPL-MCNC: 9.3 MG/DL (ref 8.7–10.5)
CHLORIDE SERPL-SCNC: 99 MMOL/L (ref 95–110)
CO2 SERPL-SCNC: 22 MMOL/L (ref 23–29)
CREAT SERPL-MCNC: 0.8 MG/DL (ref 0.5–1.4)
DIFFERENTIAL METHOD: ABNORMAL
EOSINOPHIL # BLD AUTO: 0.1 K/UL (ref 0–0.5)
EOSINOPHIL NFR BLD: 2 % (ref 0–8)
ERYTHROCYTE [DISTWIDTH] IN BLOOD BY AUTOMATED COUNT: 16.6 % (ref 11.5–14.5)
EST. GFR  (AFRICAN AMERICAN): >60 ML/MIN/1.73 M^2
EST. GFR  (NON AFRICAN AMERICAN): >60 ML/MIN/1.73 M^2
GLUCOSE SERPL-MCNC: 120 MG/DL (ref 70–110)
HCT VFR BLD AUTO: 26.5 % (ref 40–54)
HGB BLD-MCNC: 9.6 G/DL (ref 14–18)
IMM GRANULOCYTES # BLD AUTO: 0.02 K/UL (ref 0–0.04)
IMM GRANULOCYTES NFR BLD AUTO: 0.4 % (ref 0–0.5)
LYMPHOCYTES # BLD AUTO: 1.3 K/UL (ref 1–4.8)
LYMPHOCYTES NFR BLD: 26.3 % (ref 18–48)
MCH RBC QN AUTO: 28.2 PG (ref 27–31)
MCHC RBC AUTO-ENTMCNC: 36.2 G/DL (ref 32–36)
MCV RBC AUTO: 78 FL (ref 82–98)
MONOCYTES # BLD AUTO: 0.7 K/UL (ref 0.3–1)
MONOCYTES NFR BLD: 13.4 % (ref 4–15)
NEUTROPHILS # BLD AUTO: 2.8 K/UL (ref 1.8–7.7)
NEUTROPHILS NFR BLD: 57.1 % (ref 38–73)
NRBC BLD-RTO: 0 /100 WBC
PLATELET # BLD AUTO: 277 K/UL (ref 150–450)
PMV BLD AUTO: 9.8 FL (ref 9.2–12.9)
POTASSIUM SERPL-SCNC: 3.6 MMOL/L (ref 3.5–5.1)
PROT SERPL-MCNC: 6.1 G/DL (ref 6–8.4)
RBC # BLD AUTO: 3.4 M/UL (ref 4.6–6.2)
SODIUM SERPL-SCNC: 131 MMOL/L (ref 136–145)
WBC # BLD AUTO: 4.94 K/UL (ref 3.9–12.7)

## 2022-04-07 PROCEDURE — 96361 HYDRATE IV INFUSION ADD-ON: CPT | Performed by: EMERGENCY MEDICINE

## 2022-04-07 PROCEDURE — 96366 THER/PROPH/DIAG IV INF ADDON: CPT | Performed by: EMERGENCY MEDICINE

## 2022-04-07 PROCEDURE — 85025 COMPLETE CBC W/AUTO DIFF WBC: CPT | Performed by: NURSE PRACTITIONER

## 2022-04-07 PROCEDURE — 80053 COMPREHEN METABOLIC PANEL: CPT | Performed by: NURSE PRACTITIONER

## 2022-04-07 PROCEDURE — 25000003 PHARM REV CODE 250: Performed by: INTERNAL MEDICINE

## 2022-04-07 PROCEDURE — 94761 N-INVAS EAR/PLS OXIMETRY MLT: CPT

## 2022-04-07 PROCEDURE — 25000003 PHARM REV CODE 250: Performed by: NURSE PRACTITIONER

## 2022-04-07 PROCEDURE — 63600175 PHARM REV CODE 636 W HCPCS: Performed by: INTERNAL MEDICINE

## 2022-04-07 PROCEDURE — 96376 TX/PRO/DX INJ SAME DRUG ADON: CPT | Performed by: EMERGENCY MEDICINE

## 2022-04-07 PROCEDURE — G0378 HOSPITAL OBSERVATION PER HR: HCPCS

## 2022-04-07 PROCEDURE — 36415 COLL VENOUS BLD VENIPUNCTURE: CPT | Performed by: NURSE PRACTITIONER

## 2022-04-07 RX ORDER — SODIUM CHLORIDE 9 MG/ML
INJECTION, SOLUTION INTRAVENOUS CONTINUOUS
Status: DISCONTINUED | OUTPATIENT
Start: 2022-04-07 | End: 2022-04-08

## 2022-04-07 RX ORDER — SODIUM CHLORIDE 9 MG/ML
INJECTION, SOLUTION INTRAVENOUS CONTINUOUS
Status: DISCONTINUED | OUTPATIENT
Start: 2022-04-07 | End: 2022-04-07

## 2022-04-07 RX ADMIN — SODIUM CHLORIDE: 0.9 INJECTION, SOLUTION INTRAVENOUS at 05:04

## 2022-04-07 RX ADMIN — PIPERACILLIN SODIUM AND TAZOBACTAM SODIUM 4.5 G: 4; .5 INJECTION, POWDER, FOR SOLUTION INTRAVENOUS at 01:04

## 2022-04-07 RX ADMIN — BUMETANIDE 1 MG: 1 TABLET ORAL at 09:04

## 2022-04-07 RX ADMIN — POTASSIUM CHLORIDE 20 MEQ: 1500 TABLET, EXTENDED RELEASE ORAL at 09:04

## 2022-04-07 RX ADMIN — PIPERACILLIN SODIUM AND TAZOBACTAM SODIUM 4.5 G: 4; .5 INJECTION, POWDER, FOR SOLUTION INTRAVENOUS at 05:04

## 2022-04-07 RX ADMIN — SODIUM CHLORIDE 125 ML: 0.9 INJECTION, SOLUTION INTRAVENOUS at 01:04

## 2022-04-07 RX ADMIN — SPIRONOLACTONE 25 MG: 25 TABLET, FILM COATED ORAL at 09:04

## 2022-04-07 RX ADMIN — PIPERACILLIN SODIUM AND TAZOBACTAM SODIUM 4.5 G: 4; .5 INJECTION, POWDER, FOR SOLUTION INTRAVENOUS at 09:04

## 2022-04-07 NOTE — PLAN OF CARE
Pt AAOx4 .POC reviewed with pt. Pt verbalized understanding   Pt remains free of injuries and falls; fall precaution in place   Dobutamine Drip 3.4mL/hr from home CHERISE PICC  125mL/hr bolus infusing for hypotension   IV Zosyn Q8H  No C/O of pain  Room  air  Palliative care consulted  Bed low, side rails up x2, non slip socks in use, call light in reach   Reminded to call for assistance  Hourly rounding complete will continue to monitor

## 2022-04-07 NOTE — ASSESSMENT & PLAN NOTE
ERCP warranted for biopsy - GI consulted  Hold Eliquis x 2 days  IV ABX  Pain Control    04/07/2022  ERCP with biopsy tomorrow at 1400  NPO after midnight

## 2022-04-07 NOTE — SUBJECTIVE & OBJECTIVE
Interval History: see above    Review of Systems   Constitutional:  Positive for unexpected weight change. Negative for chills, fatigue and fever.   HENT:  Positive for dental problem.    Eyes:  Negative for visual disturbance.   Respiratory:  Positive for shortness of breath. Negative for cough.    Cardiovascular:  Positive for leg swelling. Negative for chest pain and palpitations.   Gastrointestinal:  Positive for abdominal pain. Negative for diarrhea, nausea and vomiting.   Genitourinary:         Dark urine   Musculoskeletal:  Positive for back pain.   Skin:  Positive for rash.   Neurological:  Negative for dizziness, weakness and headaches.   Psychiatric/Behavioral: Negative.     All other systems reviewed and are negative.  Objective:     Vital Signs (Most Recent):  Temp: 98.3 °F (36.8 °C) (04/07/22 1202)  Pulse: 76 (04/07/22 1202)  Resp: 17 (04/07/22 1202)  BP: (!) 95/57 (04/07/22 1202)  SpO2: 98 % (04/07/22 1202)   Vital Signs (24h Range):  Temp:  [98 °F (36.7 °C)-98.7 °F (37.1 °C)] 98.3 °F (36.8 °C)  Pulse:  [72-81] 76  Resp:  [17-20] 17  SpO2:  [97 %-100 %] 98 %  BP: (86-96)/(50-57) 95/57     Weight: 89.2 kg (196 lb 10.4 oz)  Body mass index is 25.25 kg/m².    Intake/Output Summary (Last 24 hours) at 4/7/2022 1348  Last data filed at 4/7/2022 0800  Gross per 24 hour   Intake 600 ml   Output --   Net 600 ml      Physical Exam  Vitals and nursing note reviewed. Exam conducted with a chaperone present (Family at ).   Constitutional:       Appearance: He is well-developed. He is ill-appearing.   HENT:      Head: Normocephalic and atraumatic.      Nose: Nose normal.   Eyes:      General: Scleral icterus present.      Pupils: Pupils are equal, round, and reactive to light.   Cardiovascular:      Rate and Rhythm: Normal rate and regular rhythm.      Heart sounds: Murmur heard.     No friction rub. No gallop.   Pulmonary:      Effort: Pulmonary effort is normal.      Breath sounds: Normal breath sounds.    Abdominal:      General: Bowel sounds are normal. There is no distension.      Palpations: Abdomen is soft.      Tenderness: There is no abdominal tenderness. There is no guarding.   Musculoskeletal:         General: No tenderness. Normal range of motion.      Cervical back: Normal range of motion and neck supple.      Right lower leg: Edema present.      Left lower leg: Edema present.   Skin:     General: Skin is warm and dry.      Coloration: Skin is jaundiced.   Neurological:      Mental Status: He is alert and oriented to person, place, and time.   Psychiatric:         Behavior: Behavior normal.       Significant Labs: All pertinent labs within the past 24 hours have been reviewed.  Results for orders placed or performed during the hospital encounter of 04/05/22   CBC Auto Differential   Result Value Ref Range    WBC 4.79 3.90 - 12.70 K/uL    RBC 3.34 (L) 4.60 - 6.20 M/uL    Hemoglobin 9.5 (L) 14.0 - 18.0 g/dL    Hematocrit 25.6 (L) 40.0 - 54.0 %    MCV 77 (L) 82 - 98 fL    MCH 28.4 27.0 - 31.0 pg    MCHC 37.1 (H) 32.0 - 36.0 g/dL    RDW 16.4 (H) 11.5 - 14.5 %    Platelets 229 150 - 450 K/uL    MPV 9.3 9.2 - 12.9 fL    Immature Granulocytes 0.2 0.0 - 0.5 %    Gran # (ANC) 2.7 1.8 - 7.7 K/uL    Immature Grans (Abs) 0.01 0.00 - 0.04 K/uL    Lymph # 1.3 1.0 - 4.8 K/uL    Mono # 0.7 0.3 - 1.0 K/uL    Eos # 0.1 0.0 - 0.5 K/uL    Baso # 0.02 0.00 - 0.20 K/uL    nRBC 0 0 /100 WBC    Gran % 56.0 38.0 - 73.0 %    Lymph % 26.9 18.0 - 48.0 %    Mono % 14.0 4.0 - 15.0 %    Eosinophil % 2.5 0.0 - 8.0 %    Basophil % 0.4 0.0 - 1.9 %    Differential Method Automated    Ammonia   Result Value Ref Range    Ammonia 26 10 - 50 umol/L   Lipase   Result Value Ref Range    Lipase 17 4 - 60 U/L   Comprehensive Metabolic Panel   Result Value Ref Range    Sodium 132 (L) 136 - 145 mmol/L    Potassium 3.4 (L) 3.5 - 5.1 mmol/L    Chloride 99 95 - 110 mmol/L    CO2 22 (L) 23 - 29 mmol/L    Glucose 135 (H) 70 - 110 mg/dL    BUN 17 8 - 23  mg/dL    Creatinine 0.8 0.5 - 1.4 mg/dL    Calcium 9.6 8.7 - 10.5 mg/dL    Total Protein 6.6 6.0 - 8.4 g/dL    Albumin 2.4 (L) 3.5 - 5.2 g/dL    Total Bilirubin 18.7 (H) 0.1 - 1.0 mg/dL    Alkaline Phosphatase 977 (H) 55 - 135 U/L    AST 90 (H) 10 - 40 U/L    ALT 62 (H) 10 - 44 U/L    Anion Gap 11 8 - 16 mmol/L    eGFR if African American >60 >60 mL/min/1.73 m^2    eGFR if non African American >60 >60 mL/min/1.73 m^2   Urinalysis, Reflex to Urine Culture Urine, Clean Catch    Specimen: Urine   Result Value Ref Range    Specimen UA Urine, Clean Catch     Color, UA Brown (A) Yellow, Straw, Deysi    Appearance, UA Hazy (A) Clear    pH, UA 6.0 5.0 - 8.0    Specific Gravity, UA 1.025 1.005 - 1.030    Protein, UA 1+ (A) Negative    Glucose, UA Negative Negative    Ketones, UA Trace (A) Negative    Bilirubin (UA) 3+ (A) Negative    Occult Blood UA Negative Negative    Nitrite, UA Positive (A) Negative    Urobilinogen, UA 1.0 <2.0 EU/dL    Leukocytes, UA Trace (A) Negative   BNP   Result Value Ref Range     (H) 0 - 99 pg/mL   CK   Result Value Ref Range    CPK 25 20 - 200 U/L   Troponin I   Result Value Ref Range    Troponin I 0.010 0.000 - 0.026 ng/mL   Drug screen panel, in-house   Result Value Ref Range    Benzodiazepines Negative Negative    Methadone metabolites Negative Negative    Cocaine (Metab.) Negative Negative    Opiate Scrn, Ur Negative Negative    Barbiturate Screen, Ur Negative Negative    Amphetamine Screen, Ur Negative Negative    THC Negative Negative    Phencyclidine Negative Negative    Creatinine, Urine 177.2 23.0 - 375.0 mg/dL    Toxicology Information SEE COMMENT    Ethanol   Result Value Ref Range    Alcohol, Serum <10 <10 mg/dL   Salicylate Level   Result Value Ref Range    Salicylate Lvl <5.0 (L) 15.0 - 30.0 mg/dL   Acetaminophen Level   Result Value Ref Range    Acetaminophen (Tylenol), Serum 3.0 (L) 10.0 - 20.0 ug/mL   Hepatitis panel, acute   Result Value Ref Range    Hepatitis B Surface  Ag Negative Negative    Hep B C IgM Negative Negative    Hep A IgM Negative Negative    Hepatitis C Ab Negative Negative   Hepatitis C Antibody   Result Value Ref Range    Hepatitis C Ab Negative Negative   HCV Virus Hold Specimen   Result Value Ref Range    HEP C Virus Hold Specimen Hold for HCV sendout    COVID-19 Rapid Screening   Result Value Ref Range    SARS-CoV-2 RNA, Amplification, Qual Negative Negative   Urinalysis Microscopic   Result Value Ref Range    RBC, UA 3 0 - 4 /hpf    WBC, UA 5 0 - 5 /hpf    Bacteria Few (A) None-Occ /hpf    Hyaline Casts, UA 1 0-1/lpf /lpf    Granular Casts, UA 2 (A) None /lpf    Microscopic Comment SEE COMMENT    Ferritin   Result Value Ref Range    Ferritin 1,157 (H) 20.0 - 300.0 ng/mL   Folate   Result Value Ref Range    Folate 8.6 4.0 - 24.0 ng/mL   Iron and TIBC   Result Value Ref Range    Iron 113 45 - 160 ug/dL    Transferrin 176 (L) 200 - 375 mg/dL    TIBC 260 250 - 450 ug/dL    Saturated Iron 43 20 - 50 %   Transferrin   Result Value Ref Range    Transferrin 176 (L) 200 - 375 mg/dL   CBC with Automated Differential   Result Value Ref Range    WBC 5.14 3.90 - 12.70 K/uL    RBC 3.14 (L) 4.60 - 6.20 M/uL    Hemoglobin 9.0 (L) 14.0 - 18.0 g/dL    Hematocrit 24.0 (L) 40.0 - 54.0 %    MCV 76 (L) 82 - 98 fL    MCH 28.7 27.0 - 31.0 pg    MCHC 37.5 (H) 32.0 - 36.0 g/dL    RDW 16.2 (H) 11.5 - 14.5 %    Platelets 249 150 - 450 K/uL    MPV 9.8 9.2 - 12.9 fL    Immature Granulocytes 0.4 0.0 - 0.5 %    Gran # (ANC) 2.8 1.8 - 7.7 K/uL    Immature Grans (Abs) 0.02 0.00 - 0.04 K/uL    Lymph # 1.4 1.0 - 4.8 K/uL    Mono # 0.7 0.3 - 1.0 K/uL    Eos # 0.2 0.0 - 0.5 K/uL    Baso # 0.04 0.00 - 0.20 K/uL    nRBC 0 0 /100 WBC    Gran % 53.9 38.0 - 73.0 %    Lymph % 27.8 18.0 - 48.0 %    Mono % 13.8 4.0 - 15.0 %    Eosinophil % 3.3 0.0 - 8.0 %    Basophil % 0.8 0.0 - 1.9 %    Differential Method Automated    Comprehensive Metabolic Panel (CMP)   Result Value Ref Range    Sodium 131 (L) 136 -  145 mmol/L    Potassium 3.4 (L) 3.5 - 5.1 mmol/L    Chloride 99 95 - 110 mmol/L    CO2 21 (L) 23 - 29 mmol/L    Glucose 99 70 - 110 mg/dL    BUN 14 8 - 23 mg/dL    Creatinine 0.7 0.5 - 1.4 mg/dL    Calcium 9.3 8.7 - 10.5 mg/dL    Total Protein 6.1 6.0 - 8.4 g/dL    Albumin 2.1 (L) 3.5 - 5.2 g/dL    Total Bilirubin 18.1 (H) 0.1 - 1.0 mg/dL    Alkaline Phosphatase 914 (H) 55 - 135 U/L    AST 80 (H) 10 - 40 U/L    ALT 56 (H) 10 - 44 U/L    Anion Gap 11 8 - 16 mmol/L    eGFR if African American >60 >60 mL/min/1.73 m^2    eGFR if non African American >60 >60 mL/min/1.73 m^2   Cancer antigen 19-9   Result Value Ref Range    CA 19-9 <2.1 0.0 - 40.0 U/mL   CBC with Automated Differential   Result Value Ref Range    WBC 4.94 3.90 - 12.70 K/uL    RBC 3.40 (L) 4.60 - 6.20 M/uL    Hemoglobin 9.6 (L) 14.0 - 18.0 g/dL    Hematocrit 26.5 (L) 40.0 - 54.0 %    MCV 78 (L) 82 - 98 fL    MCH 28.2 27.0 - 31.0 pg    MCHC 36.2 (H) 32.0 - 36.0 g/dL    RDW 16.6 (H) 11.5 - 14.5 %    Platelets 277 150 - 450 K/uL    MPV 9.8 9.2 - 12.9 fL    Immature Granulocytes 0.4 0.0 - 0.5 %    Gran # (ANC) 2.8 1.8 - 7.7 K/uL    Immature Grans (Abs) 0.02 0.00 - 0.04 K/uL    Lymph # 1.3 1.0 - 4.8 K/uL    Mono # 0.7 0.3 - 1.0 K/uL    Eos # 0.1 0.0 - 0.5 K/uL    Baso # 0.04 0.00 - 0.20 K/uL    nRBC 0 0 /100 WBC    Gran % 57.1 38.0 - 73.0 %    Lymph % 26.3 18.0 - 48.0 %    Mono % 13.4 4.0 - 15.0 %    Eosinophil % 2.0 0.0 - 8.0 %    Basophil % 0.8 0.0 - 1.9 %    Differential Method Automated    Comprehensive Metabolic Panel (CMP)   Result Value Ref Range    Sodium 131 (L) 136 - 145 mmol/L    Potassium 3.6 3.5 - 5.1 mmol/L    Chloride 99 95 - 110 mmol/L    CO2 22 (L) 23 - 29 mmol/L    Glucose 120 (H) 70 - 110 mg/dL    BUN 13 8 - 23 mg/dL    Creatinine 0.8 0.5 - 1.4 mg/dL    Calcium 9.3 8.7 - 10.5 mg/dL    Total Protein 6.1 6.0 - 8.4 g/dL    Albumin 2.0 (L) 3.5 - 5.2 g/dL    Total Bilirubin 19.5 (H) 0.1 - 1.0 mg/dL    Alkaline Phosphatase 936 (H) 55 - 135 U/L     AST 73 (H) 10 - 40 U/L    ALT 52 (H) 10 - 44 U/L    Anion Gap 10 8 - 16 mmol/L    eGFR if African American >60 >60 mL/min/1.73 m^2    eGFR if non African American >60 >60 mL/min/1.73 m^2   Echo   Result Value Ref Range    BSA 2.16 m2    TDI SEPTAL 0.05 m/s    LV LATERAL E/E' RATIO 11.20 m/s    LV SEPTAL E/E' RATIO 22.40 m/s    LA WIDTH 4.67 cm    IVC diameter 1.42 cm    Left Ventricular Outflow Tract Mean Velocity 0.09726840293 cm/s    Left Ventricular Outflow Tract Mean Gradient 1.07 mmHg    TDI LATERAL 0.10 m/s    LVIDd 6.40 (A) 3.5 - 6.0 cm    IVS 1.27 (A) 0.6 - 1.1 cm    Posterior Wall 1.33 (A) 0.6 - 1.1 cm    Ao root annulus 3.83 cm    LVIDs 6.19 (A) 2.1 - 4.0 cm    FS 3 28 - 44 %    LA volume 89.94 cm3    Sinus 3.67 cm    STJ 3.29 cm    Ascending aorta 3.17 cm    LV mass 389.02 g    LA size 4.15 cm    TAPSE 1.81 cm    Left Ventricle Relative Wall Thickness 0.42 cm    AV mean gradient 13 mmHg    AV valve area 1.11 cm2    AV Velocity Ratio 0.29     AV index (prosthetic) 0.33     MV valve area p 1/2 method 5.14 cm2    E/A ratio 3.29     Mean e' 0.08 m/s    E wave deceleration time [id number] msec    IVRT [id number] msec    LVOT diameter 2.07 cm    LVOT area 3.4 cm2    LVOT peak milan 0.68 m/s    LVOT peak VTI 14.20 cm    Ao peak milan 2.37 m/s    Ao VTI 43.0 cm    RVOT peak milan 0.71 m/s    RVOT peak VTI 10.4 cm    Mr max milan 4.84 m/s    LVOT stroke volume 47.76 cm3    AV peak gradient 22 mmHg    PV mean gradient 1.29 mmHg    E/E' ratio 14.93 m/s    MV Peak E [place] 1.12 m/s    TR Max [place] 3.33 m/s    MV stenosis pressure 1/2 time [id number] ms    MV Peak A [place] 0.34 m/s    LV Systolic Volume 193.28 mL    LV Systolic Volume Index 89.5 mL/m2    LV Diastolic Volume 208.66 mL    LV Diastolic Volume Index 96.60 mL/m2    LA Volume Index 41.6 mL/m2    LV Mass Index 180 g/m2    Echo EF Estimated 7 %    RA Major Axis 5.42 cm    Left Atrium Minor Axis 4.67 cm    Left Atrium Major Axis 6.57 cm    Triscuspid  Valve Regurgitation Peak Gradient 44 mmHg    RA Width 5.42 cm    Right Atrial Pressure (from IVC) 3 mmHg    EF 15 %    TV rest pulmonary artery pressure 47 mmHg       Significant Imaging: I have reviewed all pertinent imaging results/findings within the past 24 hours.  Imaging Results              US Abdomen Limited (Final result)  Result time 04/05/22 12:17:53      Final result by Lacho Hair MD (04/05/22 12:17:53)                   Impression:      3.6 cm solid mass seen at the level the head of the pancreas with significant ductal dilatation of the pancreatic duct measuring up to 11 mm.   Moderate intrahepatic and common bile duct dilatation. 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  Recommend ERCP with endoscopic ultrasound and tissue sampling.      Electronically signed by: Lacho Hair MD  Date:    04/05/2022  Time:    12:17               Narrative:    EXAMINATION:  US ABDOMEN LIMITED    CLINICAL HISTORY:  jaundice;    COMPARISON:  None    FINDINGS:  Limited right upper quadrant ultrasound performed.  The liver measures 18.5 cm in length and is homogeneous in echogenicity.  3.7 and 1.6 cm solid lesions are seen within the right hepatic lobe concerning for metastatic disease.  Portal vein is patent.  Common bile duct is dilated at 15 mm.  Moderate intrahepatic ductal dilatation.  Sludge is seen within the gallbladder.  Gallbladder is moderately distended.  No gallstones, gallbladder wall thickening, or focal tenderness over the gallbladder.  3.6 cm solid mass seen at the level the head of the pancreas with significant ductal dilatation of the pancreatic duct measuring up to 11 mm.  The right kidney is normal in length measuring 12 cm.  14 mm cyst is seen within the right kidney.  No right sided hydronephrosis or renal masses identified.                                       X-Ray Chest AP Portable (Final result)  Result time 04/05/22 10:19:12       Final result by Lacho Hair MD (04/05/22 10:19:12)                   Impression:      No acute process seen.      Electronically signed by: Lacho Hair MD  Date:    04/05/2022  Time:    10:19               Narrative:    EXAMINATION:  XR CHEST AP PORTABLE    CLINICAL HISTORY:  weakness;    FINDINGS:  Single view of the chest.  No comparison.    Left-sided pacing wire noted.  Right-sided PICC line tip overlies the SVC.    Cardiac silhouette is mildly enlarged.  Aorta demonstrates atherosclerotic disease..  The lungs demonstrate no evidence of active disease.  No evidence of pleural effusion or pneumothorax.  Bones demonstrate scattered degenerative change.

## 2022-04-07 NOTE — PROGRESS NOTES
[facility] - Telemetry ([facility] Medicine  Progress Note    Patient Name: Avery Earl  MRN: [id number]  Patient Class: OP- Observation   Admission Date: 4/5/2022  Length of Stay: 0 days  Attending Physician: Jose Rafael Dyson MD  Primary Care Provider: Keven Cuadra DO        Subjective:     Principal Problem:Pancreatic mass        HPI:  Pt is a 67 yo male with pMHx of CABG, ischemic cardiomyopathy on continuous dobutamine infusion at home, DVT, PAD, HPL, severe mitral insuffiencey and pulmonary hypertension. He presents today due to dark, tea colored urine onset approx 2 weeks and scleral jaundice for approx 1.5 weeks. He endorses intermittent generalized abdominal pain and back pain. Within the last 3 months, pt has lost approx 30 pounds as he had all his teeth extracted due to possible cardiac procedure. He describes KIMBALL and bilateral lower leg swelling.   In the ED, temp 98, pulse 92, resp 14, B/P 86/55 and SpO2 100 %  Labs find H/H 9.5/25.6, Na 132, potassium 3.4, gluc 135, alk phos 977, total bilirubin 18.7 and AST 90 and ALT 62  Urine is tea colored and analysis reflects multiple abnormalities.  CXR - no acute cardiopulmonary findings  Abdominal US - 3.6 cm solid mass seen at the level the head of the pancreas with significant ductal dilatation of the pancreatic duct measuring up to 11 mm.   Moderate intrahepatic and common bile duct dilatation. 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  Recommend ERCP with endoscopic ultrasound and tissue sampling.  ED spoke with Dr. Miller - hold further anticoagulation - full consult pending by GI.   Cardiology to provide opinion regarding cardiac clearance given ischemic cardiomyopathy prior to ERCP.      Overview/Hospital Course:  Patient was placed into observation for suspected pancreatic malignancy. Patient is also on continuous dobutamine drip for ischemic cardiomyopathy. GI consulted-Anticoagulants  held and patient started on zosyn with plans for ERCP Friday. Cardiology consulted for clearance. Palliative care and Pastoral care consulted. Labs and vitals reviewed. Low K this morning- repleted. Continue pain control and IV ABX.   04/07/2022  Labs and vitals reviewed. Family at bedside today. Assisted patient with setting up [employer] account. Appears saddened, flat affect, ready to have some answers. Low BP this morning that improved after IVF. Continue to monitor closely. ERCP planned for tomorrow- scheduled for 1400. NPO after midnight.       Interval History: see above    Review of Systems   Constitutional:  Positive for unexpected weight change. Negative for chills, fatigue and fever.   HENT:  Positive for dental problem.    Eyes:  Negative for visual disturbance.   Respiratory:  Positive for shortness of breath. Negative for cough.    Cardiovascular:  Positive for leg swelling. Negative for chest pain and palpitations.   Gastrointestinal:  Positive for abdominal pain. Negative for diarrhea, nausea and vomiting.   Genitourinary:         Dark urine   Musculoskeletal:  Positive for back pain.   Skin:  Positive for rash.   Neurological:  Negative for dizziness, weakness and headaches.   Psychiatric/Behavioral: Negative.     All other systems reviewed and are negative.  Objective:     Vital Signs (Most Recent):  Temp: 98.3 °F (36.8 °C) (04/07/22 1202)  Pulse: 76 (04/07/22 1202)  Resp: 17 (04/07/22 1202)  BP: (!) 95/57 (04/07/22 1202)  SpO2: 98 % (04/07/22 1202)   Vital Signs (24h Range):  Temp:  [98 °F (36.7 °C)-98.7 °F (37.1 °C)] 98.3 °F (36.8 °C)  Pulse:  [72-81] 76  Resp:  [17-20] 17  SpO2:  [97 %-100 %] 98 %  BP: (86-96)/(50-57) 95/57     Weight: 89.2 kg (196 lb 10.4 oz)  Body mass index is 25.25 kg/m².    Intake/Output Summary (Last 24 hours) at 4/7/2022 1348  Last data filed at 4/7/2022 0800  Gross per 24 hour   Intake 600 ml   Output --   Net 600 ml      Physical Exam  Vitals and nursing note reviewed.  Exam conducted with a chaperone present (Family at ).   Constitutional:       Appearance: He is well-developed. He is ill-appearing.   HENT:      Head: Normocephalic and atraumatic.      Nose: Nose normal.   Eyes:      General: Scleral icterus present.      Pupils: Pupils are equal, round, and reactive to light.   Cardiovascular:      Rate and Rhythm: Normal rate and regular rhythm.      Heart sounds: Murmur heard.     No friction rub. No gallop.   Pulmonary:      Effort: Pulmonary effort is normal.      Breath sounds: Normal breath sounds.   Abdominal:      General: Bowel sounds are normal. There is no distension.      Palpations: Abdomen is soft.      Tenderness: There is no abdominal tenderness. There is no guarding.   Musculoskeletal:         General: No tenderness. Normal range of motion.      Cervical back: Normal range of motion and neck supple.      Right lower leg: Edema present.      Left lower leg: Edema present.   Skin:     General: Skin is warm and dry.      Coloration: Skin is jaundiced.   Neurological:      Mental Status: He is alert and oriented to person, place, and time.   Psychiatric:         Behavior: Behavior normal.       Significant Labs: All pertinent labs within the past 24 hours have been reviewed.  Results for orders placed or performed during the hospital encounter of 04/05/22   CBC Auto Differential   Result Value Ref Range    WBC 4.79 3.90 - 12.70 K/uL    RBC 3.34 (L) 4.60 - 6.20 M/uL    Hemoglobin 9.5 (L) 14.0 - 18.0 g/dL    Hematocrit 25.6 (L) 40.0 - 54.0 %    MCV 77 (L) 82 - 98 fL    MCH 28.4 27.0 - 31.0 pg    MCHC 37.1 (H) 32.0 - 36.0 g/dL    RDW 16.4 (H) 11.5 - 14.5 %    Platelets 229 150 - 450 K/uL    MPV 9.3 9.2 - 12.9 fL    Immature Granulocytes 0.2 0.0 - 0.5 %    Gran # (ANC) 2.7 1.8 - 7.7 K/uL    Immature Grans (Abs) 0.01 0.00 - 0.04 K/uL    Lymph # 1.3 1.0 - 4.8 K/uL    Mono # 0.7 0.3 - 1.0 K/uL    Eos # 0.1 0.0 - 0.5 K/uL    Baso # 0.02 0.00 - 0.20 K/uL    nRBC 0 0 /100  WBC    Gran % 56.0 38.0 - 73.0 %    Lymph % 26.9 18.0 - 48.0 %    Mono % 14.0 4.0 - 15.0 %    Eosinophil % 2.5 0.0 - 8.0 %    Basophil % 0.4 0.0 - 1.9 %    Differential Method Automated    Ammonia   Result Value Ref Range    Ammonia 26 10 - 50 umol/L   Lipase   Result Value Ref Range    Lipase 17 4 - 60 U/L   Comprehensive Metabolic Panel   Result Value Ref Range    Sodium 132 (L) 136 - 145 mmol/L    Potassium 3.4 (L) 3.5 - 5.1 mmol/L    Chloride 99 95 - 110 mmol/L    CO2 22 (L) 23 - 29 mmol/L    Glucose 135 (H) 70 - 110 mg/dL    BUN 17 8 - 23 mg/dL    Creatinine 0.8 0.5 - 1.4 mg/dL    Calcium 9.6 8.7 - 10.5 mg/dL    Total Protein 6.6 6.0 - 8.4 g/dL    Albumin 2.4 (L) 3.5 - 5.2 g/dL    Total Bilirubin 18.7 (H) 0.1 - 1.0 mg/dL    Alkaline Phosphatase 977 (H) 55 - 135 U/L    AST 90 (H) 10 - 40 U/L    ALT 62 (H) 10 - 44 U/L    Anion Gap 11 8 - 16 mmol/L    eGFR if African American >60 >60 mL/min/1.73 m^2    eGFR if non African American >60 >60 mL/min/1.73 m^2   Urinalysis, Reflex to Urine Culture Urine, Clean Catch    Specimen: Urine   Result Value Ref Range    Specimen UA Urine, Clean Catch     Color, UA Brown (A) Yellow, Straw, Deysi    Appearance, UA Hazy (A) Clear    pH, UA 6.0 5.0 - 8.0    Specific Gravity, UA 1.025 1.005 - 1.030    Protein, UA 1+ (A) Negative    Glucose, UA Negative Negative    Ketones, UA Trace (A) Negative    Bilirubin (UA) 3+ (A) Negative    Occult Blood UA Negative Negative    Nitrite, UA Positive (A) Negative    Urobilinogen, UA 1.0 <2.0 EU/dL    Leukocytes, UA Trace (A) Negative   BNP   Result Value Ref Range     (H) 0 - 99 pg/mL   CK   Result Value Ref Range    CPK 25 20 - 200 U/L   Troponin I   Result Value Ref Range    Troponin I 0.010 0.000 - 0.026 ng/mL   Drug screen panel, in-house   Result Value Ref Range    Benzodiazepines Negative Negative    Methadone metabolites Negative Negative    Cocaine (Metab.) Negative Negative    Opiate Scrn, Ur Negative Negative    Barbiturate  Screen, Ur Negative Negative    Amphetamine Screen, Ur Negative Negative    THC Negative Negative    Phencyclidine Negative Negative    Creatinine, Urine 177.2 23.0 - 375.0 mg/dL    Toxicology Information SEE COMMENT    Ethanol   Result Value Ref Range    Alcohol, Serum <10 <10 mg/dL   Salicylate Level   Result Value Ref Range    Salicylate Lvl <5.0 (L) 15.0 - 30.0 mg/dL   Acetaminophen Level   Result Value Ref Range    Acetaminophen (Tylenol), Serum 3.0 (L) 10.0 - 20.0 ug/mL   Hepatitis panel, acute   Result Value Ref Range    Hepatitis B Surface Ag Negative Negative    Hep B C IgM Negative Negative    Hep A IgM Negative Negative    Hepatitis C Ab Negative Negative   Hepatitis C Antibody   Result Value Ref Range    Hepatitis C Ab Negative Negative   HCV Virus Hold Specimen   Result Value Ref Range    HEP C Virus Hold Specimen Hold for HCV sendout    COVID-19 Rapid Screening   Result Value Ref Range    SARS-CoV-2 RNA, Amplification, Qual Negative Negative   Urinalysis Microscopic   Result Value Ref Range    RBC, UA 3 0 - 4 /hpf    WBC, UA 5 0 - 5 /hpf    Bacteria Few (A) None-Occ /hpf    Hyaline Casts, UA 1 0-1/lpf /lpf    Granular Casts, UA 2 (A) None /lpf    Microscopic Comment SEE COMMENT    Ferritin   Result Value Ref Range    Ferritin 1,157 (H) 20.0 - 300.0 ng/mL   Folate   Result Value Ref Range    Folate 8.6 4.0 - 24.0 ng/mL   Iron and TIBC   Result Value Ref Range    Iron 113 45 - 160 ug/dL    Transferrin 176 (L) 200 - 375 mg/dL    TIBC 260 250 - 450 ug/dL    Saturated Iron 43 20 - 50 %   Transferrin   Result Value Ref Range    Transferrin 176 (L) 200 - 375 mg/dL   CBC with Automated Differential   Result Value Ref Range    WBC 5.14 3.90 - 12.70 K/uL    RBC 3.14 (L) 4.60 - 6.20 M/uL    Hemoglobin 9.0 (L) 14.0 - 18.0 g/dL    Hematocrit 24.0 (L) 40.0 - 54.0 %    MCV 76 (L) 82 - 98 fL    MCH 28.7 27.0 - 31.0 pg    MCHC 37.5 (H) 32.0 - 36.0 g/dL    RDW 16.2 (H) 11.5 - 14.5 %    Platelets 249 150 - 450 K/uL     MPV 9.8 9.2 - 12.9 fL    Immature Granulocytes 0.4 0.0 - 0.5 %    Gran # (ANC) 2.8 1.8 - 7.7 K/uL    Immature Grans (Abs) 0.02 0.00 - 0.04 K/uL    Lymph # 1.4 1.0 - 4.8 K/uL    Mono # 0.7 0.3 - 1.0 K/uL    Eos # 0.2 0.0 - 0.5 K/uL    Baso # 0.04 0.00 - 0.20 K/uL    nRBC 0 0 /100 WBC    Gran % 53.9 38.0 - 73.0 %    Lymph % 27.8 18.0 - 48.0 %    Mono % 13.8 4.0 - 15.0 %    Eosinophil % 3.3 0.0 - 8.0 %    Basophil % 0.8 0.0 - 1.9 %    Differential Method Automated    Comprehensive Metabolic Panel (CMP)   Result Value Ref Range    Sodium 131 (L) 136 - 145 mmol/L    Potassium 3.4 (L) 3.5 - 5.1 mmol/L    Chloride 99 95 - 110 mmol/L    CO2 21 (L) 23 - 29 mmol/L    Glucose 99 70 - 110 mg/dL    BUN 14 8 - 23 mg/dL    Creatinine 0.7 0.5 - 1.4 mg/dL    Calcium 9.3 8.7 - 10.5 mg/dL    Total Protein 6.1 6.0 - 8.4 g/dL    Albumin 2.1 (L) 3.5 - 5.2 g/dL    Total Bilirubin 18.1 (H) 0.1 - 1.0 mg/dL    Alkaline Phosphatase 914 (H) 55 - 135 U/L    AST 80 (H) 10 - 40 U/L    ALT 56 (H) 10 - 44 U/L    Anion Gap 11 8 - 16 mmol/L    eGFR if African American >60 >60 mL/min/1.73 m^2    eGFR if non African American >60 >60 mL/min/1.73 m^2   Cancer antigen 19-9   Result Value Ref Range    CA 19-9 <2.1 0.0 - 40.0 U/mL   CBC with Automated Differential   Result Value Ref Range    WBC 4.94 3.90 - 12.70 K/uL    RBC 3.40 (L) 4.60 - 6.20 M/uL    Hemoglobin 9.6 (L) 14.0 - 18.0 g/dL    Hematocrit 26.5 (L) 40.0 - 54.0 %    MCV 78 (L) 82 - 98 fL    MCH 28.2 27.0 - 31.0 pg    MCHC 36.2 (H) 32.0 - 36.0 g/dL    RDW 16.6 (H) 11.5 - 14.5 %    Platelets 277 150 - 450 K/uL    MPV 9.8 9.2 - 12.9 fL    Immature Granulocytes 0.4 0.0 - 0.5 %    Gran # (ANC) 2.8 1.8 - 7.7 K/uL    Immature Grans (Abs) 0.02 0.00 - 0.04 K/uL    Lymph # 1.3 1.0 - 4.8 K/uL    Mono # 0.7 0.3 - 1.0 K/uL    Eos # 0.1 0.0 - 0.5 K/uL    Baso # 0.04 0.00 - 0.20 K/uL    nRBC 0 0 /100 WBC    Gran % 57.1 38.0 - 73.0 %    Lymph % 26.3 18.0 - 48.0 %    Mono % 13.4 4.0 - 15.0 %    Eosinophil % 2.0  0.0 - 8.0 %    Basophil % 0.8 0.0 - 1.9 %    Differential Method Automated    Comprehensive Metabolic Panel (CMP)   Result Value Ref Range    Sodium 131 (L) 136 - 145 mmol/L    Potassium 3.6 3.5 - 5.1 mmol/L    Chloride 99 95 - 110 mmol/L    CO2 22 (L) 23 - 29 mmol/L    Glucose 120 (H) 70 - 110 mg/dL    BUN 13 8 - 23 mg/dL    Creatinine 0.8 0.5 - 1.4 mg/dL    Calcium 9.3 8.7 - 10.5 mg/dL    Total Protein 6.1 6.0 - 8.4 g/dL    Albumin 2.0 (L) 3.5 - 5.2 g/dL    Total Bilirubin 19.5 (H) 0.1 - 1.0 mg/dL    Alkaline Phosphatase 936 (H) 55 - 135 U/L    AST 73 (H) 10 - 40 U/L    ALT 52 (H) 10 - 44 U/L    Anion Gap 10 8 - 16 mmol/L    eGFR if African American >60 >60 mL/min/1.73 m^2    eGFR if non African American >60 >60 mL/min/1.73 m^2   Echo   Result Value Ref Range    BSA 2.16 m2    TDI SEPTAL 0.05 m/s    LV LATERAL E/E' RATIO 11.20 m/s    LV SEPTAL E/E' RATIO 22.40 m/s    LA WIDTH 4.67 cm    IVC diameter 1.42 cm    Left Ventricular Outflow Tract Mean Velocity 0.99174533327 cm/s    Left Ventricular Outflow Tract Mean Gradient 1.07 mmHg    TDI LATERAL 0.10 m/s    LVIDd 6.40 (A) 3.5 - 6.0 cm    IVS 1.27 (A) 0.6 - 1.1 cm    Posterior Wall 1.33 (A) 0.6 - 1.1 cm    Ao root annulus 3.83 cm    LVIDs 6.19 (A) 2.1 - 4.0 cm    FS 3 28 - 44 %    LA volume 89.94 cm3    Sinus 3.67 cm    STJ 3.29 cm    Ascending aorta 3.17 cm    LV mass 389.02 g    LA size 4.15 cm    TAPSE 1.81 cm    Left Ventricle Relative Wall Thickness 0.42 cm    AV mean gradient 13 mmHg    AV valve area 1.11 cm2    AV Velocity Ratio 0.29     AV index (prosthetic) 0.33     MV valve area p 1/2 method 5.14 cm2    E/A ratio 3.29     Mean e' 0.08 m/s    E wave deceleration time 147.325345873339425 msec    IVRT 62.309768027489545 msec    LVOT diameter 2.07 cm    LVOT area 3.4 cm2    LVOT peak tre 0.68 m/s    LVOT peak VTI 14.20 cm    Ao peak tre 2.37 m/s    Ao VTI 43.0 cm    RVOT peak tre 0.71 m/s    RVOT peak VTI 10.4 cm    Mr max tre 4.84 m/s    LVOT stroke volume  47.76 cm3    AV peak gradient 22 mmHg    PV mean gradient 1.29 mmHg    E/E' ratio 14.93 m/s    MV Peak E [place] 1.12 m/s    TR Max [place] 3.33 m/s    MV stenosis pressure 1/2 time 42.004732610358505 ms    MV Peak A [place] 0.34 m/s    LV Systolic Volume 193.28 mL    LV Systolic Volume Index 89.5 mL/m2    LV Diastolic Volume 208.66 mL    LV Diastolic Volume Index 96.60 mL/m2    LA Volume Index 41.6 mL/m2    LV Mass Index 180 g/m2    Echo EF Estimated 7 %    RA Major Axis 5.42 cm    Left Atrium Minor Axis 4.67 cm    Left Atrium Major Axis 6.57 cm    Triscuspid Valve Regurgitation Peak Gradient 44 mmHg    RA Width 5.42 cm    Right Atrial Pressure (from IVC) 3 mmHg    EF 15 %    TV rest pulmonary artery pressure 47 mmHg       Significant Imaging: I have reviewed all pertinent imaging results/findings within the past 24 hours.  Imaging Results              US Abdomen Limited (Final result)  Result time 04/05/22 12:17:53      Final result by Lacho Hair MD (04/05/22 12:17:53)                   Impression:      3.6 cm solid mass seen at the level the head of the pancreas with significant ductal dilatation of the pancreatic duct measuring up to 11 mm.   Moderate intrahepatic and common bile duct dilatation. 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  Recommend ERCP with endoscopic ultrasound and tissue sampling.      Electronically signed by: Lacho Hair MD  Date:    04/05/2022  Time:    12:17               Narrative:    EXAMINATION:  US ABDOMEN LIMITED    CLINICAL HISTORY:  jaundice;    COMPARISON:  None    FINDINGS:  Limited right upper quadrant ultrasound performed.  The liver measures 18.5 cm in length and is homogeneous in echogenicity.  3.7 and 1.6 cm solid lesions are seen within the right hepatic lobe concerning for metastatic disease.  Portal vein is patent.  Common bile duct is dilated at 15 mm.  Moderate intrahepatic ductal dilatation.  Sludge is seen within the  gallbladder.  Gallbladder is moderately distended.  No gallstones, gallbladder wall thickening, or focal tenderness over the gallbladder.  3.6 cm solid mass seen at the level the head of the pancreas with significant ductal dilatation of the pancreatic duct measuring up to 11 mm.  The right kidney is normal in length measuring 12 cm.  14 mm cyst is seen within the right kidney.  No right sided hydronephrosis or renal masses identified.                                       X-Ray Chest AP Portable (Final result)  Result time 04/05/22 10:19:12      Final result by Lacho Hair MD (04/05/22 10:19:12)                   Impression:      No acute process seen.      Electronically signed by: Lacho Hair MD  Date:    04/05/2022  Time:    10:19               Narrative:    EXAMINATION:  XR CHEST AP PORTABLE    CLINICAL HISTORY:  weakness;    FINDINGS:  Single view of the chest.  No comparison.    Left-sided pacing wire noted.  Right-sided PICC line tip overlies the SVC.    Cardiac silhouette is mildly enlarged.  Aorta demonstrates atherosclerotic disease..  The lungs demonstrate no evidence of active disease.  No evidence of pleural effusion or pneumothorax.  Bones demonstrate scattered degenerative change.                                          Assessment/Plan:      * Pancreatic mass  ERCP warranted for biopsy - GI consulted  Hold Eliquis x 2 days  IV ABX  Pain Control    04/07/2022  ERCP with biopsy tomorrow at 1400  NPO after midnight    Microcytic anemia  Monitor CBC  Iron panel pending      On continuous oral anticoagulation  According to Cardiology - okay to hold oral anticoagulation for planned procedure and resume post operatively ASAP  According to Dr. Miller  - Eliquis will need to be held for 2 days      Pre-op evaluation    Patient who presents with obstructive jaundice/pancreatic head mass concerning for underlying malignancy  -ERCP warranted with tissue sampling  -Known history of severe MR/severe  ICM/CHF, on chronic inotropic therapy  -Echo pending  -Recent LHC 3/22 at [facility] showed patent grafts  -High risk of tyra and post OP CV complications for any procedure given co-morbidities/cardiac status    Ischemic cardiomyopathy  On dobutamine  Cards following  Cardiac monitoring      Coronary artery disease involving native coronary artery of native heart without angina pectoris  Hold ASA and eliquis  No STATIN due to elevated liver enzymes  On dobutamine  Cards following        Nonrheumatic mitral valve regurgitation  On dobutamine  Cards consulted    Chronic combined systolic and diastolic congestive heart failure  Continue Entresto  Patient is identified as having Combined Systolic and Diastolic heart failure that is Chronic. CHF is currently controlled. Latest ECHO performed and demonstrates- Results for orders placed during the hospital encounter of 04/05/22    Echo    Interpretation Summary  · The left ventricle is moderately enlarged with severely decreased systolic function.  · The estimated ejection fraction is 15%.  · Grade III left ventricular diastolic dysfunction.  · Normal right ventricular size with moderately reduced right ventricular systolic function.  · There is mild aortic valve stenosis.  · Mild to moderate tricuspid regurgitation.  · Moderate mitral regurgitation.  · Moderate left atrial enlargement.  · The estimated PA systolic pressure is 47 mmHg.  · There is pulmonary hypertension.  . Continue Bumex and aldactone and monitor clinical status closely. Monitor on telemetry. Patient is off CHF pathway.  Monitor strict Is&Os and daily weights.  Place on fluid restriction of 1.5 L. Continue to stress to patient importance of self efficacy and  on diet for CHF. Last BNP reviewed- and noted below   Recent Labs   Lab 04/05/22  1037   *   .        VTE Risk Mitigation (From admission, onward)         Ordered     IP VTE HIGH RISK PATIENT  Once         04/05/22 1452     Place sequential  compression device  Until discontinued         04/05/22 1452     Reason for No Pharmacological VTE Prophylaxis  Once        Question:  Reasons:  Answer:  Physician Provided (leave comment)    04/05/22 1452                Discharge Planning   GREG:      Code Status: Full Code   Is the patient medically ready for discharge?:     Reason for patient still in hospital (select all that apply): Treatment- IR biopsy  Discharge Plan A: Home Health                  Yolanda Preston NP  Department of Hospital Medicine   [facility] - Telemetry ([facility])

## 2022-04-07 NOTE — PLAN OF CARE
Problem: Adult Inpatient Plan of Care  Goal: Plan of Care Review  Outcome: Ongoing, Progressing  Goal: Patient-Specific Goal (Individualized)  Outcome: Ongoing, Progressing  Goal: Absence of Hospital-Acquired Illness or Injury  Outcome: Ongoing, Progressing  Goal: Optimal Comfort and Wellbeing  Outcome: Ongoing, Progressing  Goal: Readiness for Transition of Care  Outcome: Ongoing, Progressing     Problem: Infection  Goal: Absence of Infection Signs and Symptoms  Outcome: Ongoing, Progressing     Problem: Skin Injury Risk Increased  Goal: Skin Health and Integrity  Outcome: Ongoing, Progressing     Problem: Coping Ineffective  Goal: Effective Coping  Outcome: Ongoing, Progressing

## 2022-04-08 ENCOUNTER — TELEPHONE (OUTPATIENT)
Dept: CARDIOLOGY | Facility: CLINIC | Age: 67
End: 2022-04-08
Payer: MEDICARE

## 2022-04-08 ENCOUNTER — ANESTHESIA (OUTPATIENT)
Dept: ENDOSCOPY | Facility: HOSPITAL | Age: 67
DRG: 435 | End: 2022-04-08
Payer: MEDICARE

## 2022-04-08 ENCOUNTER — ANESTHESIA EVENT (OUTPATIENT)
Dept: ENDOSCOPY | Facility: HOSPITAL | Age: 67
DRG: 435 | End: 2022-04-08
Payer: MEDICARE

## 2022-04-08 LAB
ANION GAP SERPL CALC-SCNC: 12 MMOL/L (ref 8–16)
BASOPHILS # BLD AUTO: 0.03 K/UL (ref 0–0.2)
BASOPHILS NFR BLD: 0.6 % (ref 0–1.9)
BUN SERPL-MCNC: 13 MG/DL (ref 8–23)
CALCIUM SERPL-MCNC: 9.4 MG/DL (ref 8.7–10.5)
CHLORIDE SERPL-SCNC: 98 MMOL/L (ref 95–110)
CO2 SERPL-SCNC: 23 MMOL/L (ref 23–29)
CREAT SERPL-MCNC: 0.9 MG/DL (ref 0.5–1.4)
DIFFERENTIAL METHOD: ABNORMAL
EOSINOPHIL # BLD AUTO: 0.1 K/UL (ref 0–0.5)
EOSINOPHIL NFR BLD: 2.3 % (ref 0–8)
ERYTHROCYTE [DISTWIDTH] IN BLOOD BY AUTOMATED COUNT: 17.1 % (ref 11.5–14.5)
EST. GFR  (AFRICAN AMERICAN): >60 ML/MIN/1.73 M^2
EST. GFR  (NON AFRICAN AMERICAN): >60 ML/MIN/1.73 M^2
GLUCOSE SERPL-MCNC: 116 MG/DL (ref 70–110)
HCT VFR BLD AUTO: 27.1 % (ref 40–54)
HGB BLD-MCNC: 10.1 G/DL (ref 14–18)
IMM GRANULOCYTES # BLD AUTO: 0.04 K/UL (ref 0–0.04)
IMM GRANULOCYTES NFR BLD AUTO: 0.8 % (ref 0–0.5)
LYMPHOCYTES # BLD AUTO: 1.5 K/UL (ref 1–4.8)
LYMPHOCYTES NFR BLD: 28.9 % (ref 18–48)
MCH RBC QN AUTO: 28.3 PG (ref 27–31)
MCHC RBC AUTO-ENTMCNC: 37.3 G/DL (ref 32–36)
MCV RBC AUTO: 76 FL (ref 82–98)
MONOCYTES # BLD AUTO: 0.8 K/UL (ref 0.3–1)
MONOCYTES NFR BLD: 15 % (ref 4–15)
NEUTROPHILS # BLD AUTO: 2.8 K/UL (ref 1.8–7.7)
NEUTROPHILS NFR BLD: 52.4 % (ref 38–73)
NRBC BLD-RTO: 0 /100 WBC
PLATELET # BLD AUTO: 257 K/UL (ref 150–450)
PMV BLD AUTO: 9 FL (ref 9.2–12.9)
POTASSIUM SERPL-SCNC: 3.4 MMOL/L (ref 3.5–5.1)
RBC # BLD AUTO: 3.57 M/UL (ref 4.6–6.2)
SODIUM SERPL-SCNC: 133 MMOL/L (ref 136–145)
WBC # BLD AUTO: 5.33 K/UL (ref 3.9–12.7)

## 2022-04-08 PROCEDURE — 99232 PR SUBSEQUENT HOSPITAL CARE,LEVL II: ICD-10-PCS | Mod: ,,, | Performed by: INTERNAL MEDICINE

## 2022-04-08 PROCEDURE — 36415 COLL VENOUS BLD VENIPUNCTURE: CPT | Performed by: EMERGENCY MEDICINE

## 2022-04-08 PROCEDURE — 43261 ENDO CHOLANGIOPANCREATOGRAPH: CPT | Performed by: INTERNAL MEDICINE

## 2022-04-08 PROCEDURE — 43274 PR ERCP W/STENT PLCMNT BILIARY/PANCREATIC DUCT: ICD-10-PCS | Mod: ,,, | Performed by: INTERNAL MEDICINE

## 2022-04-08 PROCEDURE — 43261 PR ERCP,BIOPSY: ICD-10-PCS | Mod: 59,,, | Performed by: INTERNAL MEDICINE

## 2022-04-08 PROCEDURE — 37000008 HC ANESTHESIA 1ST 15 MINUTES: Performed by: INTERNAL MEDICINE

## 2022-04-08 PROCEDURE — 88305 TISSUE EXAM BY PATHOLOGIST: ICD-10-PCS | Mod: 26,,, | Performed by: PATHOLOGY

## 2022-04-08 PROCEDURE — 21400001 HC TELEMETRY ROOM

## 2022-04-08 PROCEDURE — C1769 GUIDE WIRE: HCPCS | Performed by: INTERNAL MEDICINE

## 2022-04-08 PROCEDURE — 25000003 PHARM REV CODE 250: Performed by: INTERNAL MEDICINE

## 2022-04-08 PROCEDURE — 85025 COMPLETE CBC W/AUTO DIFF WBC: CPT | Performed by: PHYSICIAN ASSISTANT

## 2022-04-08 PROCEDURE — 36415 COLL VENOUS BLD VENIPUNCTURE: CPT | Performed by: PHYSICIAN ASSISTANT

## 2022-04-08 PROCEDURE — 80048 BASIC METABOLIC PNL TOTAL CA: CPT | Performed by: EMERGENCY MEDICINE

## 2022-04-08 PROCEDURE — 99232 SBSQ HOSP IP/OBS MODERATE 35: CPT | Mod: ,,, | Performed by: INTERNAL MEDICINE

## 2022-04-08 PROCEDURE — C1874 STENT, COATED/COV W/DEL SYS: HCPCS | Performed by: INTERNAL MEDICINE

## 2022-04-08 PROCEDURE — 96361 HYDRATE IV INFUSION ADD-ON: CPT | Performed by: EMERGENCY MEDICINE

## 2022-04-08 PROCEDURE — 63600175 PHARM REV CODE 636 W HCPCS: Performed by: INTERNAL MEDICINE

## 2022-04-08 PROCEDURE — 88305 TISSUE EXAM BY PATHOLOGIST: CPT | Mod: 26,,, | Performed by: PATHOLOGY

## 2022-04-08 PROCEDURE — 25000003 PHARM REV CODE 250: Performed by: EMERGENCY MEDICINE

## 2022-04-08 PROCEDURE — 63600175 PHARM REV CODE 636 W HCPCS: Performed by: NURSE PRACTITIONER

## 2022-04-08 PROCEDURE — 63600175 PHARM REV CODE 636 W HCPCS: Performed by: EMERGENCY MEDICINE

## 2022-04-08 PROCEDURE — 43274 ERCP DUCT STENT PLACEMENT: CPT | Performed by: INTERNAL MEDICINE

## 2022-04-08 PROCEDURE — 43274 ERCP DUCT STENT PLACEMENT: CPT | Mod: ,,, | Performed by: INTERNAL MEDICINE

## 2022-04-08 PROCEDURE — 27202074 HC NEEDLE KNIFE, BILIARY: Performed by: INTERNAL MEDICINE

## 2022-04-08 PROCEDURE — 74328 X-RAY BILE DUCT ENDOSCOPY: CPT | Performed by: INTERNAL MEDICINE

## 2022-04-08 PROCEDURE — 88112 CYTOPATH CELL ENHANCE TECH: CPT | Performed by: PATHOLOGY

## 2022-04-08 PROCEDURE — 43261 ENDO CHOLANGIOPANCREATOGRAPH: CPT | Mod: 59,,, | Performed by: INTERNAL MEDICINE

## 2022-04-08 PROCEDURE — 25000003 PHARM REV CODE 250: Performed by: NURSE PRACTITIONER

## 2022-04-08 PROCEDURE — 63600175 PHARM REV CODE 636 W HCPCS: Performed by: NURSE ANESTHETIST, CERTIFIED REGISTERED

## 2022-04-08 PROCEDURE — 27201674 HC SPHINCTERTOME: Performed by: INTERNAL MEDICINE

## 2022-04-08 PROCEDURE — 25000003 PHARM REV CODE 250: Performed by: NURSE ANESTHETIST, CERTIFIED REGISTERED

## 2022-04-08 PROCEDURE — 27200946 HC BRUSH, CYTOLOGY: Performed by: INTERNAL MEDICINE

## 2022-04-08 PROCEDURE — 74328 X-RAY BILE DUCT ENDOSCOPY: CPT | Mod: 26,,, | Performed by: INTERNAL MEDICINE

## 2022-04-08 PROCEDURE — 27201012 HC FORCEPS, HOT/COLD, DISP: Performed by: INTERNAL MEDICINE

## 2022-04-08 PROCEDURE — 37000009 HC ANESTHESIA EA ADD 15 MINS: Performed by: INTERNAL MEDICINE

## 2022-04-08 PROCEDURE — 88112 CYTOPATH CELL ENHANCE TECH: CPT | Mod: 26,,, | Performed by: PATHOLOGY

## 2022-04-08 PROCEDURE — 74328 PR  X-RAY FOR BILE DUCT ENDOSCOPY: ICD-10-PCS | Mod: 26,,, | Performed by: INTERNAL MEDICINE

## 2022-04-08 PROCEDURE — 88112 PR  CYTOPATH, CELL ENHANCE TECH: ICD-10-PCS | Mod: 26,,, | Performed by: PATHOLOGY

## 2022-04-08 PROCEDURE — 88305 TISSUE EXAM BY PATHOLOGIST: CPT | Performed by: PATHOLOGY

## 2022-04-08 RX ORDER — ETOMIDATE 2 MG/ML
INJECTION INTRAVENOUS
Status: DISCONTINUED | OUTPATIENT
Start: 2022-04-08 | End: 2022-04-08

## 2022-04-08 RX ORDER — ROCURONIUM BROMIDE 10 MG/ML
INJECTION, SOLUTION INTRAVENOUS
Status: DISCONTINUED | OUTPATIENT
Start: 2022-04-08 | End: 2022-04-08

## 2022-04-08 RX ORDER — LIDOCAINE HYDROCHLORIDE 10 MG/ML
INJECTION, SOLUTION EPIDURAL; INFILTRATION; INTRACAUDAL; PERINEURAL
Status: DISCONTINUED | OUTPATIENT
Start: 2022-04-08 | End: 2022-04-08

## 2022-04-08 RX ORDER — MILRINONE LACTATE 0.2 MG/ML
0.25 INJECTION, SOLUTION INTRAVENOUS CONTINUOUS
Status: DISCONTINUED | OUTPATIENT
Start: 2022-04-08 | End: 2022-04-08

## 2022-04-08 RX ORDER — INDOMETHACIN 50 MG/1
SUPPOSITORY RECTAL
Status: COMPLETED | OUTPATIENT
Start: 2022-04-08 | End: 2022-04-08

## 2022-04-08 RX ORDER — INDOMETHACIN 50 MG/1
100 SUPPOSITORY RECTAL ONCE AS NEEDED
Status: DISCONTINUED | OUTPATIENT
Start: 2022-04-08 | End: 2022-04-09 | Stop reason: HOSPADM

## 2022-04-08 RX ORDER — SODIUM CHLORIDE, SODIUM LACTATE, POTASSIUM CHLORIDE, CALCIUM CHLORIDE 600; 310; 30; 20 MG/100ML; MG/100ML; MG/100ML; MG/100ML
INJECTION, SOLUTION INTRAVENOUS CONTINUOUS
Status: DISCONTINUED | OUTPATIENT
Start: 2022-04-08 | End: 2022-04-09 | Stop reason: HOSPADM

## 2022-04-08 RX ORDER — MUPIROCIN 20 MG/G
OINTMENT TOPICAL 2 TIMES DAILY
Status: DISCONTINUED | OUTPATIENT
Start: 2022-04-08 | End: 2022-04-09 | Stop reason: HOSPADM

## 2022-04-08 RX ORDER — ONDANSETRON 2 MG/ML
INJECTION INTRAMUSCULAR; INTRAVENOUS
Status: DISCONTINUED | OUTPATIENT
Start: 2022-04-08 | End: 2022-04-08

## 2022-04-08 RX ORDER — DOBUTAMINE HYDROCHLORIDE 400 MG/100ML
2.5 INJECTION INTRAVENOUS CONTINUOUS
Status: DISCONTINUED | OUTPATIENT
Start: 2022-04-08 | End: 2022-04-09 | Stop reason: HOSPADM

## 2022-04-08 RX ADMIN — POTASSIUM CHLORIDE 20 MEQ: 1500 TABLET, EXTENDED RELEASE ORAL at 11:04

## 2022-04-08 RX ADMIN — PIPERACILLIN SODIUM AND TAZOBACTAM SODIUM 4.5 G: 4; .5 INJECTION, POWDER, FOR SOLUTION INTRAVENOUS at 05:04

## 2022-04-08 RX ADMIN — SODIUM CHLORIDE, SODIUM LACTATE, POTASSIUM CHLORIDE, AND CALCIUM CHLORIDE: .6; .31; .03; .02 INJECTION, SOLUTION INTRAVENOUS at 11:04

## 2022-04-08 RX ADMIN — ETOMIDATE 18 MG: 2 INJECTION, SOLUTION INTRAVENOUS at 09:04

## 2022-04-08 RX ADMIN — ONDANSETRON 4 MG: 2 INJECTION, SOLUTION INTRAMUSCULAR; INTRAVENOUS at 10:04

## 2022-04-08 RX ADMIN — MILRINONE LACTATE 0.25 MCG/KG/MIN: 0.2 INJECTION, SOLUTION INTRAVENOUS at 05:04

## 2022-04-08 RX ADMIN — SODIUM CHLORIDE: 0.9 INJECTION, SOLUTION INTRAVENOUS at 09:04

## 2022-04-08 RX ADMIN — LIDOCAINE HYDROCHLORIDE 50 MG: 10 INJECTION, SOLUTION EPIDURAL; INFILTRATION; INTRACAUDAL; PERINEURAL at 09:04

## 2022-04-08 RX ADMIN — SODIUM CHLORIDE 250 ML: 0.9 INJECTION, SOLUTION INTRAVENOUS at 05:04

## 2022-04-08 RX ADMIN — DOBUTAMINE HYDROCHLORIDE 2.5 MCG/KG/MIN: 400 INJECTION INTRAVENOUS at 09:04

## 2022-04-08 RX ADMIN — ROCURONIUM BROMIDE 50 MG: 10 INJECTION, SOLUTION INTRAVENOUS at 09:04

## 2022-04-08 RX ADMIN — MUPIROCIN: 20 OINTMENT TOPICAL at 09:04

## 2022-04-08 RX ADMIN — SODIUM CHLORIDE, SODIUM LACTATE, POTASSIUM CHLORIDE, AND CALCIUM CHLORIDE: .6; .31; .03; .02 INJECTION, SOLUTION INTRAVENOUS at 09:04

## 2022-04-08 RX ADMIN — SODIUM CHLORIDE 250 ML: 0.9 INJECTION, SOLUTION INTRAVENOUS at 09:04

## 2022-04-08 RX ADMIN — INDOMETHACIN 100 MG: 50 SUPPOSITORY RECTAL at 09:04

## 2022-04-08 RX ADMIN — PIPERACILLIN SODIUM AND TAZOBACTAM SODIUM 4.5 G: 4; .5 INJECTION, POWDER, FOR SOLUTION INTRAVENOUS at 01:04

## 2022-04-08 RX ADMIN — PIPERACILLIN SODIUM AND TAZOBACTAM SODIUM 4.5 G: 4; .5 INJECTION, POWDER, FOR SOLUTION INTRAVENOUS at 11:04

## 2022-04-08 RX ADMIN — SUGAMMADEX 200 MG: 100 INJECTION, SOLUTION INTRAVENOUS at 10:04

## 2022-04-08 NOTE — ASSESSMENT & PLAN NOTE
-Compensated  -Echo pending  -Continue Buemex, Aldactone, Entresto as tolerated  -No BB given inotropic support    4/8/22  -Meds held given hypotension, resume regimen as tolerated  -Will likely need lower dose of Entresto 24-26 mg BID  -Reassess in AM  -Continue inotropic support/on chronic dobutamine infusion. Would avoid switching to milrinone if possible to prevent any cardiac instability.

## 2022-04-08 NOTE — TELEPHONE ENCOUNTER
Mercedes with [facility] called and would like to speak with someone urgently about getting an order for a substitute medication for Dobutamine due to a nation wide shortage.  Pt is currently inpatient at [facility].    Please call her ASAP  [phone]  Fax: [phone]

## 2022-04-08 NOTE — PROVATION PATIENT INSTRUCTIONS
Discharge Summary/Instructions after an Endoscopic Procedure  Patient Name: Avery Earl  Patient MRN: [id number]  Patient YOB: 1955 Friday, April 8, 2022 Merritt Darby MD  Dear patient,  As a result of recent federal legislation (The Federal Cures Act), you may   receive lab or pathology results from your procedure in your [facility]   account before your physician is able to contact you. Your physician or   their representative will relay the results to you with their   recommendations at their soonest availability.  Thank you,  RESTRICTIONS:  During your procedure today, you received medications for sedation.  These   medications may affect your judgment, balance and coordination.  Therefore,   for 24 hours, you have the following restrictions:   - DO NOT drive a car, operate machinery, make legal/financial decisions,   sign important papers or drink alcohol.    ACTIVITY:  Today: no heavy lifting, straining or running due to procedural   sedation/anesthesia.  The following day: return to full activity including work.  DIET:  Eat and drink normally unless instructed otherwise.     TREATMENT FOR COMMON SIDE EFFECTS:  - Mild abdominal pain, nausea, belching, bloating or excessive gas:  rest,   eat lightly and use a heating pad.  - Sore Throat: treat with throat lozenges and/or gargle with warm salt   water.  - Because air was used during the procedure, expelling large amounts of air   from your rectum or belching is normal.  - If a bowel prep was taken, you may not have a bowel movement for 1-3 days.    This is normal.  SYMPTOMS TO WATCH FOR AND REPORT TO YOUR PHYSICIAN:  1. Abdominal pain or bloating, other than gas cramps.  2. Chest pain.  3. Back pain.  4. Signs of infection such as: chills or fever occurring within 24 hours   after the procedure.  5. Rectal bleeding, which would show as bright red, maroon, or black stools.   (A tablespoon of blood from the rectum is not serious, especially  if   hemorrhoids are present.)  6. Vomiting.  7. Weakness or dizziness.  GO DIRECTLY TO THE NEAREST EMERGENCY ROOM IF YOU HAVE ANY OF THE FOLLOWING:      Difficulty breathing              Chills and/or fever over 101 F   Persistent vomiting and/or vomiting blood   Severe abdominal pain   Severe chest pain   Black, tarry stools   Bleeding- more than one tablespoon   Any other symptom or condition that you feel may need urgent attention  Your doctor recommends these additional instructions:  If any biopsies were taken, your doctors clinic will contact you in 1 to 2   weeks with any results.  - [facility].   - Resume previous diet.   - Continue present medications.   - Await pathology results.   - Resume anticoagulation in 24 hours.  For questions, problems or results please call your physician Merritt Darby MD at Work:  [phone]  If you have any questions about the above instructions, call the GI   department at [phone] or call the endoscopy unit at [phone]   from 7am until 3 pm.  [facility] [place], EMERGENCY ROOM PHONE NUMBER:   [phone]  IF A COMPLICATION OR EMERGENCY SITUATION ARISES AND YOU ARE UNABLE TO REACH   YOUR PHYSICIAN - GO DIRECTLY TO THE EMERGENCY ROOM.  I have read or have had read to me these discharge instructions for my   procedure and have received a written copy.  I understand these   instructions and will follow-up with my physician if I have any questions.     __________________________________       _____________________________________  Nurse Signature                                          Patient/Designated   Responsible Party Signature  MD Merritt Le MD  4/8/2022 10:32:01 AM  This report has been verified and signed electronically.  Dear patient,  As a result of recent federal legislation (The Federal Cures Act), you may   receive lab or pathology results  from your procedure in your [facility]   account before your physician is able to contact you. Your physician or   their representative will relay the results to you with their   recommendations at their soonest availability.  Thank you,  PROVATION

## 2022-04-08 NOTE — PLAN OF CARE
Received phone call from Mercedes with [facility].  There is a Dobutamine shortage it is out of stock at their pharmacy.  Requesting patient be changed to Milrinone before discharge.    Discussed with Dr. Escalona.  Will discuss change after patient's ERCP today.

## 2022-04-08 NOTE — SUBJECTIVE & OBJECTIVE
Interval History: Patient had ERCP this AM. He will be monitored overnight and will dc home tomorrow if stable with OP f/u with Heme/onc. Oral anticoag will be restarted tomorrow morning. Admitted to inpatient.    Review of Systems   Constitutional:  Positive for unexpected weight change. Negative for chills, fatigue and fever.   HENT:  Positive for dental problem.    Eyes:  Negative for visual disturbance.   Respiratory:  Positive for shortness of breath. Negative for cough.    Cardiovascular:  Positive for leg swelling. Negative for chest pain and palpitations.   Gastrointestinal:  Positive for abdominal pain. Negative for diarrhea, nausea and vomiting.   Genitourinary:         Dark urine   Musculoskeletal:  Positive for back pain.   Skin:  Positive for rash.   Neurological:  Negative for dizziness, weakness and headaches.   Psychiatric/Behavioral: Negative.     All other systems reviewed and are negative.  Objective:     Vital Signs (Most Recent):  Temp: 97.6 °F (36.4 °C) (04/08/22 1050)  Pulse: 78 (04/08/22 1050)  Resp: 16 (04/08/22 1050)  BP: 104/75 (04/08/22 1050)  SpO2: 99 % (04/08/22 1050) Vital Signs (24h Range):  Temp:  [96.6 °F (35.9 °C)-98.3 °F (36.8 °C)] 97.6 °F (36.4 °C)  Pulse:  [69-85] 78  Resp:  [16-18] 16  SpO2:  [97 %-100 %] 99 %  BP: ()/(47-78) 104/75     Weight: 67.8 kg (149 lb 7.6 oz)  Body mass index is 19.19 kg/m².    Intake/Output Summary (Last 24 hours) at 4/8/2022 1210  Last data filed at 4/8/2022 0116  Gross per 24 hour   Intake 240 ml   Output 250 ml   Net -10 ml      Physical Exam  Vitals and nursing note reviewed. Exam conducted with a chaperone present (Family at ).   Constitutional:       Appearance: He is well-developed. He is ill-appearing.   HENT:      Head: Normocephalic and atraumatic.      Nose: Nose normal.   Eyes:      General: Scleral icterus present.      Pupils: Pupils are equal, round, and reactive to light.   Cardiovascular:      Rate and Rhythm: Normal rate and  regular rhythm.      Heart sounds: Murmur heard.     No friction rub. No gallop.   Pulmonary:      Effort: Pulmonary effort is normal.      Breath sounds: Normal breath sounds.   Abdominal:      General: Bowel sounds are normal. There is no distension.      Palpations: Abdomen is soft.      Tenderness: There is no abdominal tenderness. There is no guarding.   Musculoskeletal:         General: No tenderness. Normal range of motion.      Cervical back: Normal range of motion and neck supple.      Right lower leg: Edema present.      Left lower leg: Edema present.   Skin:     General: Skin is warm and dry.      Coloration: Skin is jaundiced.   Neurological:      Mental Status: He is alert and oriented to person, place, and time.   Psychiatric:         Behavior: Behavior normal.       Significant Labs: All pertinent labs within the past 24 hours have been reviewed.  Recent Lab Results         04/08/22  0613   04/08/22  0454        Anion Gap 12         Baso #   0.03       Basophil %   0.6       BUN 13         Calcium 9.4         Chloride 98         CO2 23         Creatinine 0.9         Differential Method   Automated       eGFR if  >60         eGFR if non  >60  Comment: Calculation used to obtain the estimated glomerular filtration  rate (eGFR) is the CKD-EPI equation.            Eos #   0.1       Eosinophil %   2.3       Glucose 116         Gran # (ANC)   2.8       Gran %   52.4       Hematocrit   27.1       Hemoglobin   10.1       Immature Grans (Abs)   0.04  Comment: Mild elevation in immature granulocytes is non specific and   can be seen in a variety of conditions including stress response,   acute inflammation, trauma and pregnancy. Correlation with other   laboratory and clinical findings is essential.         Immature Granulocytes   0.8       Lymph #   1.5       Lymph %   28.9       MCH   28.3       MCHC   37.3       MCV   76       Mono #   0.8       Mono %   15.0       MPV   9.0        nRBC   0       Platelets   257       Potassium 3.4         RBC   3.57       RDW   17.1       Sodium 133         WBC   5.33               Significant Imaging: I have reviewed all pertinent imaging results/findings within the past 24 hours.

## 2022-04-08 NOTE — TRANSFER OF CARE
"Anesthesia Transfer of Care Note    Patient: Avery Earl    Procedure(s) Performed: Procedure(s) (LRB):  ERCP (ENDOSCOPIC RETROGRADE CHOLANGIOPANCREATOGRAPHY) (N/A)  ULTRASOUND, UPPER GI TRACT, ENDOSCOPIC (N/A)    Patient location: PACU    Anesthesia Type: general    Transport from OR: Transported from OR on room air with adequate spontaneous ventilation    Post pain: adequate analgesia    Post assessment: no apparent anesthetic complications    Post vital signs: stable    Level of consciousness: awake    Nausea/Vomiting: no nausea/vomiting    Complications: none    Transfer of care protocol was followed      Last vitals:   Visit Vitals  BP (!) 94/59   Pulse 75   Temp 36.8 °C (98.2 °F) (Temporal)   Resp 18   Ht 6' 2" (1.88 m)   Wt 67.8 kg (149 lb 7.6 oz)   SpO2 100%   BMI 19.19 kg/m²     "

## 2022-04-08 NOTE — ANESTHESIA RELEASE NOTE
"Anesthesia Release from PACU Note    Patient: Avery Earl    Procedure(s) Performed: Procedure(s) (LRB):  ERCP (ENDOSCOPIC RETROGRADE CHOLANGIOPANCREATOGRAPHY) (N/A)  ULTRASOUND, UPPER GI TRACT, ENDOSCOPIC (N/A)    Anesthesia type: general    Post pain: Adequate analgesia    Post assessment: no apparent anesthetic complications    Last Vitals:   Visit Vitals  BP (!) 94/59   Pulse 75   Temp 36.8 °C (98.2 °F) (Temporal)   Resp 18   Ht 6' 2" (1.88 m)   Wt 67.8 kg (149 lb 7.6 oz)   SpO2 100%   BMI 19.19 kg/m²       Post vital signs: stable    Level of consciousness: awake    Nausea/Vomiting: no nausea/no vomiting    Complications: none    Airway Patency: patent    Respiratory: unassisted    Cardiovascular: stable and blood pressure at baseline    Hydration: euvolemic  "

## 2022-04-08 NOTE — ASSESSMENT & PLAN NOTE
-No angina  -s/p recent LHC in 3/22 at [facility] which showed patent grafts  -Hold ASA/Plavix given impending procedure, resume post-op ASAP  -No statin given jaundice    4/8/22  -Would resume single agent DAPT as patient is also on Eliquis as OP

## 2022-04-08 NOTE — PLAN OF CARE
[facility] called to [facility].  Patient to be transferred per stretcher back to room.

## 2022-04-08 NOTE — PROGRESS NOTES
"[facility] - Telemetry ([facility])  Cardiology  Progress Note    Patient Name: Aevry Earl  MRN: [id number]  Admission Date: 4/5/2022  Hospital Length of Stay: 0 days  Code Status: Full Code   Attending Physician: Kayleen Escalona MD   Primary Care Physician: Keven Cuadra DO  Expected Discharge Date:   Principal Problem:Pancreatic mass    Subjective:   HPI:  Mr. Earl is a 66 year old male patient whose current medical conditions include chronic systolic CHF (on chronic dobutamine infusion/inotropic therapy), CAD s/p CABG x 2, s/p AICD, PAD, and severe MR who was sent to [facility] ED from cardiology clinic due to jaundice that onset two weeks ago. Patient denied any associated fever, chills, abdominal pain, SOB, or chest pain. 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 Cardiology consulted for pre-op risk assessment. Patient seen and examined today, resting in bed. Feels weak. Reports decreased appetite and weight loss recently along with "pale stools". Chronic KIMBALL, but does not seem to be worse than his norm. No chest pain/angina. He reports compliance with his medications, on Eliquis as OP for unclear reasons (patient states he believes it is due to prior DVT). Recently had LHC at [facility] which showed patent grafts (LIMA to LAD, SVG to RCA) in 3/22./Previously deemed not to be a suitable for candidate for MitralClip or advanced options (LVAD). He has been evaluated by several cardiologists, Dr. Funes, Dr. Barnhart, and Dr. Simpson, most recently was seen by Dr. Infante. Echo pending.    Hospital Course:   4/6/22:  Pt seen and examined this am. CV status seemed stable. No angina. On home dobutamine.  No acute CHF sxs. Awaiting GI workup for " pancreatic mass, possible malignancy. Labs reviewed.    4/8/22-Patient seen and examined, post ERCP with tissue sampling and biliary stent placement. Sleepy, resting comfortably. No CV complaints. BP low overnight, improved this AM. Family at bedside. Labs reviewed, K slightly low being repleted.           Review of Systems   Constitutional: Positive for malaise/fatigue.   HENT: Negative.     Eyes: Negative.    Cardiovascular: Negative.    Respiratory: Negative.     Endocrine: Negative.    Hematologic/Lymphatic: Negative.    Skin: Negative.    Musculoskeletal: Negative.    Gastrointestinal: Negative.    Genitourinary: Negative.    Neurological: Negative.    Psychiatric/Behavioral: Negative.     Allergic/Immunologic: Negative.    Objective:     Vital Signs (Most Recent):  Temp: 97.6 °F (36.4 °C) (04/08/22 1050)  Pulse: 78 (04/08/22 1050)  Resp: 16 (04/08/22 1050)  BP: 104/75 (04/08/22 1050)  SpO2: 99 % (04/08/22 1050) Vital Signs (24h Range):  Temp:  [96.6 °F (35.9 °C)-98.3 °F (36.8 °C)] 97.6 °F (36.4 °C)  Pulse:  [69-85] 78  Resp:  [16-18] 16  SpO2:  [97 %-100 %] 99 %  BP: ()/(47-78) 104/75     Weight: 67.8 kg (149 lb 7.6 oz)  Body mass index is 19.19 kg/m².     SpO2: 99 %  O2 Device (Oxygen Therapy): room air      Intake/Output Summary (Last 24 hours) at 4/8/2022 1317  Last data filed at 4/8/2022 0116  Gross per 24 hour   Intake 240 ml   Output 250 ml   Net -10 ml       Lines/Drains/Airways       Peripherally Inserted Central Catheter Line  Duration             PICC Double Lumen right brachial -- days                    Physical Exam  Vitals and nursing note reviewed.   Constitutional:       General: He is not in acute distress.     Appearance: He is well-developed. He is ill-appearing. He is not diaphoretic.   HENT:      Head: Normocephalic and atraumatic.   Eyes:      General:         Right eye: No discharge.         Left eye: No discharge.      Pupils: Pupils are equal, round, and reactive to light.    Neck:      Thyroid: No thyromegaly.      Vascular: No JVD.      Trachea: No tracheal deviation.   Cardiovascular:      Rate and Rhythm: Normal rate and regular rhythm.      Heart sounds: S1 normal and S2 normal. Murmur heard.   High-pitched blowing holosystolic murmur is present at the apex.      Comments: AICD site well-healed  Pulmonary:      Effort: Pulmonary effort is normal. No respiratory distress.      Breath sounds: Normal breath sounds. No wheezing or rales.   Abdominal:      General: There is no distension.      Tenderness: There is no rebound.   Musculoskeletal:      Cervical back: Neck supple.      Right lower leg: No edema.      Left lower leg: No edema.   Skin:     General: Skin is warm and dry.      Findings: No erythema.   Neurological:      Mental Status: He is alert and oriented to person, place, and time.   Psychiatric:         Mood and Affect: Mood normal.         Behavior: Behavior normal.         Thought Content: Thought content normal.       Significant Labs: CMP   Recent Labs   Lab 04/07/22  0523 04/08/22  0613   * 133*   K 3.6 3.4*   CL 99 98   CO2 22* 23   * 116*   BUN 13 13   CREATININE 0.8 0.9   CALCIUM 9.3 9.4   PROT 6.1  --    ALBUMIN 2.0*  --    BILITOT 19.5*  --    ALKPHOS 936*  --    AST 73*  --    ALT 52*  --    ANIONGAP 10 12   ESTGFRAFRICA >60 >60   EGFRNONAA >60 >60   , CBC   Recent Labs   Lab 04/07/22  0523 04/08/22  0454   WBC 4.94 5.33   HGB 9.6* 10.1*   HCT 26.5* 27.1*    257   , Troponin No results for input(s): TROPONINI in the last 48 hours., and All pertinent lab results from the last 24 hours have been reviewed.    Significant Imaging: Echocardiogram: Transthoracic echo (TTE) complete (Cupid Only):   Results for orders placed or performed during the hospital encounter of 04/05/22   Echo   Result Value Ref Range    BSA 2.16 m2    TDI SEPTAL 0.05 m/s    LV LATERAL E/E' RATIO 11.20 m/s    LV SEPTAL E/E' RATIO 22.40 m/s    LA WIDTH 4.67 cm    IVC  diameter 1.42 cm    Left Ventricular Outflow Tract Mean Velocity 0.14198087067 cm/s    Left Ventricular Outflow Tract Mean Gradient 1.07 mmHg    TDI LATERAL 0.10 m/s    LVIDd 6.40 (A) 3.5 - 6.0 cm    IVS 1.27 (A) 0.6 - 1.1 cm    Posterior Wall 1.33 (A) 0.6 - 1.1 cm    Ao root annulus 3.83 cm    LVIDs 6.19 (A) 2.1 - 4.0 cm    FS 3 28 - 44 %    LA volume 89.94 cm3    Sinus 3.67 cm    STJ 3.29 cm    Ascending aorta 3.17 cm    LV mass 389.02 g    LA size 4.15 cm    TAPSE 1.81 cm    Left Ventricle Relative Wall Thickness 0.42 cm    AV mean gradient 13 mmHg    AV valve area 1.11 cm2    AV Velocity Ratio 0.29     AV index (prosthetic) 0.33     MV valve area p 1/2 method 5.14 cm2    E/A ratio 3.29     Mean e' 0.08 m/s    E wave deceleration time 147.608521406386432 msec    IVRT 62.273311112475316 msec    LVOT diameter 2.07 cm    LVOT area 3.4 cm2    LVOT peak milan 0.68 m/s    LVOT peak VTI 14.20 cm    Ao peak milan 2.37 m/s    Ao VTI 43.0 cm    RVOT peak milan 0.71 m/s    RVOT peak VTI 10.4 cm    Mr max milan 4.84 m/s    LVOT stroke volume 47.76 cm3    AV peak gradient 22 mmHg    PV mean gradient 1.29 mmHg    E/E' ratio 14.93 m/s    MV Peak E Milan 1.12 m/s    TR Max Milan 3.33 m/s    MV stenosis pressure 1/2 time 42.361686077547055 ms    MV Peak A Milan 0.34 m/s    LV Systolic Volume 193.28 mL    LV Systolic Volume Index 89.5 mL/m2    LV Diastolic Volume 208.66 mL    LV Diastolic Volume Index 96.60 mL/m2    LA Volume Index 41.6 mL/m2    LV Mass Index 180 g/m2    Echo EF Estimated 7 %    RA Major Axis 5.42 cm    Left Atrium Minor Axis 4.67 cm    Left Atrium Major Axis 6.57 cm    Triscuspid Valve Regurgitation Peak Gradient 44 mmHg    RA Width 5.42 cm    Right Atrial Pressure (from IVC) 3 mmHg    EF 15 %    TV rest pulmonary artery pressure 47 mmHg    Narrative    · The left ventricle is moderately enlarged with severely decreased   systolic function.  · The estimated ejection fraction is 15%.  · Grade III left ventricular diastolic  dysfunction.  · Normal right ventricular size with moderately reduced right ventricular   systolic function.  · There is mild aortic valve stenosis.  · Mild to moderate tricuspid regurgitation.  · Moderate mitral regurgitation.  · Moderate left atrial enlargement.  · The estimated PA systolic pressure is 47 mmHg.  · There is pulmonary hypertension.      , EKG: Reviewed, and X-Ray: CXR: X-Ray Chest 1 View (CXR): No results found for this visit on 04/05/22. and X-Ray Chest PA and Lateral (CXR): No results found for this visit on 04/05/22.    Assessment and Plan:     * Pancreatic mass  -s/p ERCP with tissue sampling and biliary stent placement today  -Awaiting pathology results    On continuous oral anticoagulation  -Patient on Eliquis as OP for possible DVT  -Ok to hold for planned procedure and resume post-operatively ASAP    Pre-op evaluation  -Patient who presents with obstructive jaundice/pancreatic head mass concerning for underlying malignancy  -ERCP warranted with tissue sampling  -Known history of severe MR/severe ICM/CHF, on chronic inotropic therapy  -Echo pending  -Recent LHC 3/22 at [facility] showed patent grafts  -High risk of tyra and post OP CV complications for any procedure given co-morbidities/cardiac status    Ischemic cardiomyopathy  -Stable, compensated, on chronic dobutamine infusion  -Continue Bumex, Entresto, Aldactone as tolerated  -No BB given inotropic support    4/8/22  -Build back home regimen as tolerated    Coronary artery disease involving native coronary artery of native heart without angina pectoris  -No angina  -s/p recent LHC in 3/22 at [facility] which showed patent grafts  -Hold ASA/Plavix given impending procedure, resume post-op ASAP  -No statin given jaundice    4/8/22  -Would resume single agent DAPT as patient is also on Eliquis as OP    Nonrheumatic mitral valve regurgitation  -Known severe MR, previously turned down for Mitral clip  -Repeat echo pending    Chronic combined systolic and  diastolic congestive heart failure  -Compensated  -Echo pending  -Continue Buemex, Aldactone, Entresto as tolerated  -No BB given inotropic support    4/8/22  -Meds held given hypotension, resume regimen as tolerated  -Will likely need lower dose of Entresto 24-26 mg BID  -Reassess in AM  -Continue inotropic support/on chronic dobutamine infusion. Would avoid switching to milrinone if possible to prevent any cardiac instability.        VTE Risk Mitigation (From admission, onward)         Ordered     IP VTE HIGH RISK PATIENT  Once         04/05/22 1452     Place sequential compression device  Until discontinued         04/05/22 1452     Reason for No Pharmacological VTE Prophylaxis  Once        Question:  Reasons:  Answer:  Physician Provided (leave comment)    04/05/22 1452                Angeliat Cantrell PA-C  Cardiology  [facility] - Telemetry ([facility])

## 2022-04-08 NOTE — ASSESSMENT & PLAN NOTE
-Patient who presents with obstructive jaundice/pancreatic head mass concerning for underlying malignancy  -ERCP warranted with tissue sampling  -Known history of severe MR/severe ICM/CHF, on chronic inotropic therapy  -Echo pending  -Recent LHC 3/22 at [facility] showed patent grafts  -High risk of tyra and post OP CV complications for any procedure given co-morbidities/cardiac status

## 2022-04-08 NOTE — ANESTHESIA PROCEDURE NOTES
Intubation    Date/Time: 4/8/2022 9:49 AM  Performed by: Javi Gooden CRNA  Authorized by: Suraj Best II, MD     Intubation:     Induction:  Intravenous    Intubated:  Postinduction    Attempts:  1    Attempted By:  CRNA    Method of Intubation:  Direct    Blade:  Wang 3    Laryngeal View Grade: Grade IIA - cords partially seen      Difficult Airway Encountered?: No      Complications:  None    Airway Device:  Oral endotracheal tube    Airway Device Size:  7.5    Style/Cuff Inflation:  Cuffed    Inflation Amount (mL):  8    Secured at:  The lips    Placement Verified By:  Capnometry    Complicating Factors:  None    Findings Post-Intubation:  BS equal bilateral

## 2022-04-08 NOTE — ASSESSMENT & PLAN NOTE
-Stable, compensated, on chronic dobutamine infusion  -Continue Bumex, Entresto, Aldactone as tolerated  -No BB given inotropic support    4/8/22  -Build back home regimen as tolerated

## 2022-04-08 NOTE — ANESTHESIA PREPROCEDURE EVALUATION
04/08/2022  Avery Earl is a 66 y.o., male.      Pre-op Assessment    I have reviewed the Patient Summary Reports.     I have reviewed the Nursing Notes. I have reviewed the NPO Status.   I have reviewed the Medications.     Review of Systems  Anesthesia Hx:  No problems with previous Anesthesia    Social:  Former Smoker    Hematology/Oncology:     Oncology Normal    -- Anemia:   EENT/Dental:EENT/Dental Normal   Cardiovascular:   Exercise tolerance: poor Pacemaker Hypertension, well controlled Valvular problems/Murmurs, MR Past MI CAD asymptomatic CABG/stent Dysrhythmias atrial fibrillation CHF PVD hyperlipidemia KIMBALL  Congestive Heart Failure (CHF) (LVEF 20%) , LV Systolic HF, LV Diastolic HF   Pulmonary:   Pneumonia    Renal/:  Renal/ Normal     Hepatic/GI:  Pancreatic Disease Pancreatic Tumor  Musculoskeletal:   Arthritis     Endocrine:  Endocrine Normal    Dermatological:  Skin Normal    Psych:  Psychiatric Normal           Physical Exam  General: Alert    Airway:  Mallampati: II   Mouth Opening: Normal  TM Distance: Normal  Tongue: Normal    Dental:  Intact    Chest/Lungs:  Clear to auscultation    Heart:  Rate: Normal        Anesthesia Plan  Type of Anesthesia, risks & benefits discussed:    Anesthesia Type: Gen ETT  Intra-op Monitoring Plan: Standard ASA Monitors  Induction:  IV  Airway Plan: Direct, Post-Induction  Informed Consent: Informed consent signed with the Patient and all parties understand the risks and agree with anesthesia plan.  All questions answered. Patient consented to blood products? Yes  ASA Score: 4    Ready For Surgery From Anesthesia Perspective.     .

## 2022-04-08 NOTE — INTERVAL H&P NOTE
The patient has been examined and the H&P has been reviewed:    I concur with the findings and no changes have occurred since H&P was written.    Procedure risks, benefits and alternative options discussed and understood by patient/family.    ERCP will be performed. Echoendoscope not available for EUS.the patient and his son were informed.     ERCP is currently recommended. The risks, benefits and alternatives of the procedure were discussed with the patient in detail. Benefits include removal of stones, stents, debri, sludge; dilation; placement of a stent; biopsies. Risks include bleeding (0.3-2%), pancreatitis (3%), cholangitis (0.5-3%), perforation (0.08-0.6%), cholecystitis (0.5%), sedation related adverse events. This discussion was had in the presence of his son Avery Earl Jr. Educational material of the biliary anatomy has been provided today for educational purposes. Other risks include, risks of adverse reaction to sedation requiring the use of reversal agents, bleeding requiring blood transfusion, perforation requiring surgical intervention, technical failure, aspiration leading to respiratory distress and respiratory failure resulting in endotracheal intubation and mechanical ventilation including death. Anesthesia is utilized for this procedure, it is up to the anesthesiologist to determine airway safety including elective endotracheal intubation. Questions were answered, the patient and his son agree to proceed. There was no language barriers.             Active Hospital Problems    Diagnosis  POA    *Pancreatic mass [K86.89]  Yes    Pre-op evaluation [Z01.818]  Not Applicable    On continuous oral anticoagulation [Z79.01]  Not Applicable    Microcytic anemia [D50.9]  Yes    Nonrheumatic mitral valve regurgitation [I34.0]  Yes    Ischemic cardiomyopathy [I25.5]  Yes    Coronary artery disease involving native coronary artery of native heart without angina pectoris [I25.10]  Yes    Chronic combined  systolic and diastolic congestive heart failure [I50.42]  Yes      Resolved Hospital Problems   No resolved problems to display.

## 2022-04-08 NOTE — SUBJECTIVE & OBJECTIVE
Review of Systems   Constitutional: Positive for malaise/fatigue.   HENT: Negative.     Eyes: Negative.    Cardiovascular: Negative.    Respiratory: Negative.     Endocrine: Negative.    Hematologic/Lymphatic: Negative.    Skin: Negative.    Musculoskeletal: Negative.    Gastrointestinal: Negative.    Genitourinary: Negative.    Neurological: Negative.    Psychiatric/Behavioral: Negative.     Allergic/Immunologic: Negative.    Objective:     Vital Signs (Most Recent):  Temp: 97.6 °F (36.4 °C) (04/08/22 1050)  Pulse: 78 (04/08/22 1050)  Resp: 16 (04/08/22 1050)  BP: 104/75 (04/08/22 1050)  SpO2: 99 % (04/08/22 1050) Vital Signs (24h Range):  Temp:  [96.6 °F (35.9 °C)-98.3 °F (36.8 °C)] 97.6 °F (36.4 °C)  Pulse:  [69-85] 78  Resp:  [16-18] 16  SpO2:  [97 %-100 %] 99 %  BP: ()/(47-78) 104/75     Weight: 67.8 kg (149 lb 7.6 oz)  Body mass index is 19.19 kg/m².     SpO2: 99 %  O2 Device (Oxygen Therapy): room air      Intake/Output Summary (Last 24 hours) at 4/8/2022 1317  Last data filed at 4/8/2022 0116  Gross per 24 hour   Intake 240 ml   Output 250 ml   Net -10 ml       Lines/Drains/Airways       Peripherally Inserted Central Catheter Line  Duration             PICC Double Lumen right brachial -- days                    Physical Exam  Vitals and nursing note reviewed.   Constitutional:       General: He is not in acute distress.     Appearance: He is well-developed. He is ill-appearing. He is not diaphoretic.   HENT:      Head: Normocephalic and atraumatic.   Eyes:      General:         Right eye: No discharge.         Left eye: No discharge.      Pupils: Pupils are equal, round, and reactive to light.   Neck:      Thyroid: No thyromegaly.      Vascular: No JVD.      Trachea: No tracheal deviation.   Cardiovascular:      Rate and Rhythm: Normal rate and regular rhythm.      Heart sounds: S1 normal and S2 normal. Murmur heard.   High-pitched blowing holosystolic murmur is present at the apex.      Comments:  AICD site well-healed  Pulmonary:      Effort: Pulmonary effort is normal. No respiratory distress.      Breath sounds: Normal breath sounds. No wheezing or rales.   Abdominal:      General: There is no distension.      Tenderness: There is no rebound.   Musculoskeletal:      Cervical back: Neck supple.      Right lower leg: No edema.      Left lower leg: No edema.   Skin:     General: Skin is warm and dry.      Findings: No erythema.   Neurological:      Mental Status: He is alert and oriented to person, place, and time.   Psychiatric:         Mood and Affect: Mood normal.         Behavior: Behavior normal.         Thought Content: Thought content normal.       Significant Labs: CMP   Recent Labs   Lab 04/07/22  0523 04/08/22  0613   * 133*   K 3.6 3.4*   CL 99 98   CO2 22* 23   * 116*   BUN 13 13   CREATININE 0.8 0.9   CALCIUM 9.3 9.4   PROT 6.1  --    ALBUMIN 2.0*  --    BILITOT 19.5*  --    ALKPHOS 936*  --    AST 73*  --    ALT 52*  --    ANIONGAP 10 12   ESTGFRAFRICA >60 >60   EGFRNONAA >60 >60   , CBC   Recent Labs   Lab 04/07/22  0523 04/08/22  0454   WBC 4.94 5.33   HGB 9.6* 10.1*   HCT 26.5* 27.1*    257   , Troponin No results for input(s): TROPONINI in the last 48 hours., and All pertinent lab results from the last 24 hours have been reviewed.    Significant Imaging: Echocardiogram: Transthoracic echo (TTE) complete (Cupid Only):   Results for orders placed or performed during the hospital encounter of 04/05/22   Echo   Result Value Ref Range    BSA 2.16 m2    TDI SEPTAL 0.05 m/s    LV LATERAL E/E' RATIO 11.20 m/s    LV SEPTAL E/E' RATIO 22.40 m/s    LA WIDTH 4.67 cm    IVC diameter 1.42 cm    Left Ventricular Outflow Tract Mean Velocity 0.75606045094 cm/s    Left Ventricular Outflow Tract Mean Gradient 1.07 mmHg    TDI LATERAL 0.10 m/s    LVIDd 6.40 (A) 3.5 - 6.0 cm    IVS 1.27 (A) 0.6 - 1.1 cm    Posterior Wall 1.33 (A) 0.6 - 1.1 cm    Ao root annulus 3.83 cm    LVIDs 6.19 (A) 2.1 -  4.0 cm    FS 3 28 - 44 %    LA volume 89.94 cm3    Sinus 3.67 cm    STJ 3.29 cm    Ascending aorta 3.17 cm    LV mass 389.02 g    LA size 4.15 cm    TAPSE 1.81 cm    Left Ventricle Relative Wall Thickness 0.42 cm    AV mean gradient 13 mmHg    AV valve area 1.11 cm2    AV Velocity Ratio 0.29     AV index (prosthetic) 0.33     MV valve area p 1/2 method 5.14 cm2    E/A ratio 3.29     Mean e' 0.08 m/s    E wave deceleration time 147.094420991614375 msec    IVRT 62.289657215946599 msec    LVOT diameter 2.07 cm    LVOT area 3.4 cm2    LVOT peak milan 0.68 m/s    LVOT peak VTI 14.20 cm    Ao peak milan 2.37 m/s    Ao VTI 43.0 cm    RVOT peak milan 0.71 m/s    RVOT peak VTI 10.4 cm    Mr max milan 4.84 m/s    LVOT stroke volume 47.76 cm3    AV peak gradient 22 mmHg    PV mean gradient 1.29 mmHg    E/E' ratio 14.93 m/s    MV Peak E [place] 1.12 m/s    TR Max [place] 3.33 m/s    MV stenosis pressure 1/2 time 42.057591090465230 ms    MV Peak A [place] 0.34 m/s    LV Systolic Volume 193.28 mL    LV Systolic Volume Index 89.5 mL/m2    LV Diastolic Volume 208.66 mL    LV Diastolic Volume Index 96.60 mL/m2    LA Volume Index 41.6 mL/m2    LV Mass Index 180 g/m2    Echo EF Estimated 7 %    RA Major Axis 5.42 cm    Left Atrium Minor Axis 4.67 cm    Left Atrium Major Axis 6.57 cm    Triscuspid Valve Regurgitation Peak Gradient 44 mmHg    RA Width 5.42 cm    Right Atrial Pressure (from IVC) 3 mmHg    EF 15 %    TV rest pulmonary artery pressure 47 mmHg    Narrative    · The left ventricle is moderately enlarged with severely decreased   systolic function.  · The estimated ejection fraction is 15%.  · Grade III left ventricular diastolic dysfunction.  · Normal right ventricular size with moderately reduced right ventricular   systolic function.  · There is mild aortic valve stenosis.  · Mild to moderate tricuspid regurgitation.  · Moderate mitral regurgitation.  · Moderate left atrial enlargement.  · The estimated PA systolic pressure is 47 mmHg.  ·  There is pulmonary hypertension.      , EKG: Reviewed, and X-Ray: CXR: X-Ray Chest 1 View (CXR): No results found for this visit on 04/05/22. and X-Ray Chest PA and Lateral (CXR): No results found for this visit on 04/05/22.

## 2022-04-08 NOTE — SUBJECTIVE & OBJECTIVE
Interval History:  looks and feels much better, comfortable. Last evening he was started on Milrinone gtt but dropped his BP soon which remained low 60-70 systolic while on the infusion, eventually Dr. Remy ordered it stopped and resumed Dobutamine gtt at the same old dose which he tolerated well and his BP improved. He feels much better now, and wishes to go home. His home Infusion team is trying to arrange for Dobutamine gtt to be delivered here and once that happens, he may be discharged home this evening. He is eating drinking better. Liver Bx may be back next week.     Review of Systems   Constitutional:  Positive for unexpected weight change. Negative for chills, fatigue and fever.   HENT:  Positive for dental problem.    Eyes:  Negative for visual disturbance.   Respiratory:  Positive for shortness of breath. Negative for cough.    Cardiovascular:  Positive for leg swelling. Negative for chest pain and palpitations.   Gastrointestinal:  Positive for abdominal pain. Negative for diarrhea, nausea and vomiting.   Genitourinary:         Dark urine   Musculoskeletal:  Positive for back pain.   Skin:  Positive for rash.   Neurological:  Negative for dizziness, weakness and headaches.   Psychiatric/Behavioral: Negative.     All other systems reviewed and are negative.  Objective:     Vital Signs (Most Recent):  Temp: 97.4 °F (36.3 °C) (04/08/22 1505)  Pulse: 75 (04/08/22 1505)  Resp: 20 (04/08/22 1505)  BP: (!) 91/57 (04/08/22 1505)  SpO2: 98 % (04/08/22 1505)   Vital Signs (24h Range):  Temp:  [96.6 °F (35.9 °C)-98.3 °F (36.8 °C)] 97.4 °F (36.3 °C)  Pulse:  [69-85] 75  Resp:  [16-20] 20  SpO2:  [97 %-100 %] 98 %  BP: ()/(47-78) 91/57     Weight: 67.8 kg (149 lb 7.6 oz)  Body mass index is 19.19 kg/m².    Intake/Output Summary (Last 24 hours) at 4/8/2022 1622  Last data filed at 4/8/2022 0116  Gross per 24 hour   Intake 240 ml   Output 250 ml   Net -10 ml      Physical Exam  Vitals and nursing note reviewed.  Exam conducted with a chaperone present (Family at ).   Constitutional:       Appearance: He is well-developed. He is ill-appearing.   HENT:      Head: Normocephalic and atraumatic.      Nose: Nose normal.   Eyes:      General: Scleral icterus present.      Pupils: Pupils are equal, round, and reactive to light.   Cardiovascular:      Rate and Rhythm: Normal rate and regular rhythm.      Heart sounds: Murmur heard.     No friction rub. No gallop.   Pulmonary:      Effort: Pulmonary effort is normal.      Breath sounds: Normal breath sounds.   Abdominal:      General: Bowel sounds are normal. There is no distension.      Palpations: Abdomen is soft.      Tenderness: There is no abdominal tenderness. There is no guarding.   Musculoskeletal:         General: No tenderness. Normal range of motion.      Cervical back: Normal range of motion and neck supple.      Right lower leg: Edema present.      Left lower leg: Edema present.   Skin:     General: Skin is warm and dry.      Coloration: Skin is jaundiced.   Neurological:      Mental Status: He is alert and oriented to person, place, and time.   Psychiatric:         Behavior: Behavior normal.       Significant Labs: All pertinent labs within the past 24 hours have been reviewed.  Blood Culture:   BMP:   Recent Labs   Lab 04/08/22  0613   *   *   K 3.4*   CL 98   CO2 23   BUN 13   CREATININE 0.9   CALCIUM 9.4     CBC:   Recent Labs   Lab 04/08/22  0454   WBC 5.33   HGB 10.1*   HCT 27.1*          Significant Imaging: I have reviewed all pertinent imaging results/findings within the past 24 hours.

## 2022-04-08 NOTE — ANESTHESIA POSTPROCEDURE EVALUATION
Anesthesia Post Evaluation    Patient: Avery Earl    Procedure(s) Performed: Procedure(s) (LRB):  ERCP (ENDOSCOPIC RETROGRADE CHOLANGIOPANCREATOGRAPHY) (N/A)  ULTRASOUND, UPPER GI TRACT, ENDOSCOPIC (N/A)    Final Anesthesia Type: general      Patient location during evaluation: PACU  Patient participation: Yes- Able to Participate  Level of consciousness: awake and alert  Post-procedure vital signs: reviewed and stable  Pain management: adequate  Airway patency: patent    PONV status at discharge: No PONV  Anesthetic complications: no      Cardiovascular status: blood pressure returned to baseline  Respiratory status: unassisted  Hydration status: euvolemic  Follow-up not needed.          Vitals Value Taken Time   /66 04/08/22 1033   Temp 98 04/08/22 1033   Pulse 66 04/08/22 1033   Resp 12 04/08/22 1033   SpO2 98 04/08/22 1033         No case tracking events are documented in the log.      Pain/Dick Score: No data recorded

## 2022-04-08 NOTE — PROGRESS NOTES
[facility] - Telemetry ([facility] Medicine  Progress Note    Patient Name: Avery Earl  MRN: [id number]  Patient Class: IP- Inpatient   Admission Date: 4/5/2022  Length of Stay: 0 days  Attending Physician: Kayleen Escalona MD  Primary Care Provider: Keven Cuadra DO        Subjective:     Principal Problem:Pancreatic mass        HPI:  Pt is a 67 yo male with pMHx of CABG, ischemic cardiomyopathy on continuous dobutamine infusion at home, DVT, PAD, HPL, severe mitral insuffiencey and pulmonary hypertension. He presents today due to dark, tea colored urine onset approx 2 weeks and scleral jaundice for approx 1.5 weeks. He endorses intermittent generalized abdominal pain and back pain. Within the last 3 months, pt has lost approx 30 pounds as he had all his teeth extracted due to possible cardiac procedure. He describes KIMBALL and bilateral lower leg swelling.   In the ED, temp 98, pulse 92, resp 14, B/P 86/55 and SpO2 100 %  Labs find H/H 9.5/25.6, Na 132, potassium 3.4, gluc 135, alk phos 977, total bilirubin 18.7 and AST 90 and ALT 62  Urine is tea colored and analysis reflects multiple abnormalities.  CXR - no acute cardiopulmonary findings  Abdominal US - 3.6 cm solid mass seen at the level the head of the pancreas with significant ductal dilatation of the pancreatic duct measuring up to 11 mm.   Moderate intrahepatic and common bile duct dilatation. 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  Recommend ERCP with endoscopic ultrasound and tissue sampling.  ED spoke with Dr. Miller - hold further anticoagulation - full consult pending by GI.   Cardiology to provide opinion regarding cardiac clearance given ischemic cardiomyopathy prior to ERCP.      Overview/Hospital Course:  Patient was placed into observation for suspected pancreatic malignancy. Patient is also on continuous dobutamine drip for ischemic cardiomyopathy. GI consulted-Anticoagulants held  and patient started on zosyn with plans for ERCP Friday. Cardiology consulted for clearance. Palliative care and Pastoral care consulted. Labs and vitals reviewed. Low K this morning- repleted. Continue pain control and IV ABX.   04/07/2022  Labs and vitals reviewed. Family at bedside today. Assisted patient with setting up [employer] account. Appears saddened, flat affect, ready to have some answers. Low BP this morning that improved after IVF. Continue to monitor closely. ERCP planned for tomorrow- scheduled for 1400. NPO after midnight.       Interval History: Patient had ERCP this AM. He will be monitored overnight and will dc home tomorrow if stable with OP f/u with Heme/onc. Oral anticoag will be restarted tomorrow morning. Admitted to inpatient. Home health requesting Dobutamine infusion to milrinone infusion due to Dobutamine shortage. Cardiology notified.    Review of Systems   Constitutional:  Positive for unexpected weight change. Negative for chills, fatigue and fever.   HENT:  Positive for dental problem.    Eyes:  Negative for visual disturbance.   Respiratory:  Positive for shortness of breath. Negative for cough.    Cardiovascular:  Positive for leg swelling. Negative for chest pain and palpitations.   Gastrointestinal:  Positive for abdominal pain. Negative for diarrhea, nausea and vomiting.   Genitourinary:         Dark urine   Musculoskeletal:  Positive for back pain.   Skin:  Positive for rash.   Neurological:  Negative for dizziness, weakness and headaches.   Psychiatric/Behavioral: Negative.     All other systems reviewed and are negative.  Objective:     Vital Signs (Most Recent):  Temp: 97.6 °F (36.4 °C) (04/08/22 1050)  Pulse: 78 (04/08/22 1050)  Resp: 16 (04/08/22 1050)  BP: 104/75 (04/08/22 1050)  SpO2: 99 % (04/08/22 1050) Vital Signs (24h Range):  Temp:  [96.6 °F (35.9 °C)-98.3 °F (36.8 °C)] 97.6 °F (36.4 °C)  Pulse:  [69-85] 78  Resp:  [16-18] 16  SpO2:  [97 %-100 %] 99 %  BP:  ()/(47-78) 104/75     Weight: 67.8 kg (149 lb 7.6 oz)  Body mass index is 19.19 kg/m².    Intake/Output Summary (Last 24 hours) at 4/8/2022 1210  Last data filed at 4/8/2022 0116  Gross per 24 hour   Intake 240 ml   Output 250 ml   Net -10 ml      Physical Exam  Vitals and nursing note reviewed. Exam conducted with a chaperone present (Family at ).   Constitutional:       Appearance: He is well-developed. He is ill-appearing.   HENT:      Head: Normocephalic and atraumatic.      Nose: Nose normal.   Eyes:      General: Scleral icterus present.      Pupils: Pupils are equal, round, and reactive to light.   Cardiovascular:      Rate and Rhythm: Normal rate and regular rhythm.      Heart sounds: Murmur heard.     No friction rub. No gallop.   Pulmonary:      Effort: Pulmonary effort is normal.      Breath sounds: Normal breath sounds.   Abdominal:      General: Bowel sounds are normal. There is no distension.      Palpations: Abdomen is soft.      Tenderness: There is no abdominal tenderness. There is no guarding.   Musculoskeletal:         General: No tenderness. Normal range of motion.      Cervical back: Normal range of motion and neck supple.      Right lower leg: Edema present.      Left lower leg: Edema present.   Skin:     General: Skin is warm and dry.      Coloration: Skin is jaundiced.   Neurological:      Mental Status: He is alert and oriented to person, place, and time.   Psychiatric:         Behavior: Behavior normal.       Significant Labs: All pertinent labs within the past 24 hours have been reviewed.  Recent Lab Results         04/08/22  0613   04/08/22  0454        Anion Gap 12         Baso #   0.03       Basophil %   0.6       BUN 13         Calcium 9.4         Chloride 98         CO2 23         Creatinine 0.9         Differential Method   Automated       eGFR if  >60         eGFR if non  >60  Comment: Calculation used to obtain the estimated glomerular  filtration  rate (eGFR) is the CKD-EPI equation.            Eos #   0.1       Eosinophil %   2.3       Glucose 116         Gran # (ANC)   2.8       Gran %   52.4       Hematocrit   27.1       Hemoglobin   10.1       Immature Grans (Abs)   0.04  Comment: Mild elevation in immature granulocytes is non specific and   can be seen in a variety of conditions including stress response,   acute inflammation, trauma and pregnancy. Correlation with other   laboratory and clinical findings is essential.         Immature Granulocytes   0.8       Lymph #   1.5       Lymph %   28.9       MCH   28.3       MCHC   37.3       MCV   76       Mono #   0.8       Mono %   15.0       MPV   9.0       nRBC   0       Platelets   257       Potassium 3.4         RBC   3.57       RDW   17.1       Sodium 133         WBC   5.33               Significant Imaging: I have reviewed all pertinent imaging results/findings within the past 24 hours.      Assessment/Plan:      * Pancreatic mass  ERCP warranted for biopsy - GI consulted  Hold Eliquis x 2 days  IV ABX  Pain Control    04/08/2022  --ERCP with biopsy today  --will need OP f/u with heme/onc          Microcytic anemia  Monitor CBC  Iron panel pending      On continuous oral anticoagulation  According to Cardiology - okay to hold oral anticoagulation for planned procedure and resume post operatively ASAP  According to Dr. Miller  - Eliquis will need to be held for 2 days      Pre-op evaluation    Patient who presents with obstructive jaundice/pancreatic head mass concerning for underlying malignancy  -ERCP warranted with tissue sampling  -Known history of severe MR/severe ICM/CHF, on chronic inotropic therapy  -Echo pending  -Recent LHC 3/22 at [facility] showed patent grafts  -High risk of tyra and post OP CV complications for any procedure given co-morbidities/cardiac status    Ischemic cardiomyopathy  On dobutamine  Cards following  Cardiac monitoring      Coronary artery disease involving native  coronary artery of native heart without angina pectoris  Hold ASA and eliquis  No STATIN due to elevated liver enzymes  On dobutamine  Cards following        Nonrheumatic mitral valve regurgitation  On dobutamine  Cards consulted    Chronic combined systolic and diastolic congestive heart failure  Continue Entresto  Patient is identified as having Combined Systolic and Diastolic heart failure that is Chronic. CHF is currently controlled. Latest ECHO performed and demonstrates- Results for orders placed during the hospital encounter of 04/05/22    Echo    Interpretation Summary  · The left ventricle is moderately enlarged with severely decreased systolic function.  · The estimated ejection fraction is 15%.  · Grade III left ventricular diastolic dysfunction.  · Normal right ventricular size with moderately reduced right ventricular systolic function.  · There is mild aortic valve stenosis.  · Mild to moderate tricuspid regurgitation.  · Moderate mitral regurgitation.  · Moderate left atrial enlargement.  · The estimated PA systolic pressure is 47 mmHg.  · There is pulmonary hypertension.  . Continue Bumex and aldactone and monitor clinical status closely. Monitor on telemetry. Patient is off CHF pathway.  Monitor strict Is&Os and daily weights.  Place on fluid restriction of 1.5 L. Continue to stress to patient importance of self efficacy and  on diet for CHF. Last BNP reviewed- and noted below   Recent Labs   Lab 04/05/22  1037   *   .        VTE Risk Mitigation (From admission, onward)         Ordered     IP VTE HIGH RISK PATIENT  Once         04/05/22 1452     Place sequential compression device  Until discontinued         04/05/22 1452     Reason for No Pharmacological VTE Prophylaxis  Once        Question:  Reasons:  Answer:  Physician Provided (leave comment)    04/05/22 1452                Discharge Planning   GREG:      Code Status: Full Code   Is the patient medically ready for discharge?:      Reason for patient still in hospital (select all that apply): Patient trending condition and Consult recommendations  Discharge Plan A: Home Health                  REJI Tipton  Department of Hospital Medicine   [facility] - Telemetry ([facility])

## 2022-04-08 NOTE — NURSING
Report received from Estela RN, jason RN. Pt transferred to bed without incident. AAOx4. Family present at bedside.

## 2022-04-08 NOTE — ASSESSMENT & PLAN NOTE
ERCP warranted for biopsy - GI consulted  Hold Eliquis x 2 days  IV ABX  Pain Control    04/08/2022  --ERCP with biopsy today  --will need OP f/u with heme/onc

## 2022-04-09 VITALS
OXYGEN SATURATION: 99 % | SYSTOLIC BLOOD PRESSURE: 91 MMHG | WEIGHT: 200.19 LBS | HEART RATE: 79 BPM | DIASTOLIC BLOOD PRESSURE: 56 MMHG | TEMPERATURE: 99 F | BODY MASS INDEX: 25.69 KG/M2 | HEIGHT: 74 IN | RESPIRATION RATE: 18 BRPM

## 2022-04-09 PROBLEM — R17 JAUNDICE: Status: RESOLVED | Noted: 2022-04-09 | Resolved: 2022-04-09

## 2022-04-09 PROBLEM — R17 JAUNDICE: Status: ACTIVE | Noted: 2022-04-09

## 2022-04-09 PROBLEM — Z01.818 PRE-OP EVALUATION: Status: RESOLVED | Noted: 2022-04-05 | Resolved: 2022-04-09

## 2022-04-09 PROBLEM — R53.1 WEAKNESS: Status: ACTIVE | Noted: 2022-04-09

## 2022-04-09 PROCEDURE — 99232 SBSQ HOSP IP/OBS MODERATE 35: CPT | Mod: ,,, | Performed by: INTERNAL MEDICINE

## 2022-04-09 PROCEDURE — 63600175 PHARM REV CODE 636 W HCPCS: Performed by: INTERNAL MEDICINE

## 2022-04-09 PROCEDURE — 25000003 PHARM REV CODE 250: Performed by: INTERNAL MEDICINE

## 2022-04-09 PROCEDURE — 63600175 PHARM REV CODE 636 W HCPCS: Performed by: EMERGENCY MEDICINE

## 2022-04-09 PROCEDURE — 99232 PR SUBSEQUENT HOSPITAL CARE,LEVL II: ICD-10-PCS | Mod: ,,, | Performed by: INTERNAL MEDICINE

## 2022-04-09 PROCEDURE — 25000003 PHARM REV CODE 250: Performed by: NURSE PRACTITIONER

## 2022-04-09 RX ORDER — AMOXICILLIN AND CLAVULANATE POTASSIUM 875; 125 MG/1; MG/1
1 TABLET, FILM COATED ORAL EVERY 12 HOURS
Status: DISCONTINUED | OUTPATIENT
Start: 2022-04-09 | End: 2022-04-09 | Stop reason: HOSPADM

## 2022-04-09 RX ORDER — AMOXICILLIN AND CLAVULANATE POTASSIUM 875; 125 MG/1; MG/1
1 TABLET, FILM COATED ORAL EVERY 12 HOURS
Qty: 10 TABLET | Refills: 0 | Status: SHIPPED | OUTPATIENT
Start: 2022-04-09 | End: 2022-09-05 | Stop reason: ALTCHOICE

## 2022-04-09 RX ORDER — DOBUTAMINE HYDROCHLORIDE 400 MG/100ML
2.5 INJECTION INTRAVENOUS CONTINUOUS
Start: 2022-04-09

## 2022-04-09 RX ADMIN — POTASSIUM CHLORIDE 20 MEQ: 1500 TABLET, EXTENDED RELEASE ORAL at 08:04

## 2022-04-09 RX ADMIN — SODIUM CHLORIDE: 0.9 INJECTION, SOLUTION INTRAVENOUS at 01:04

## 2022-04-09 RX ADMIN — SODIUM CHLORIDE, SODIUM LACTATE, POTASSIUM CHLORIDE, AND CALCIUM CHLORIDE: .6; .31; .03; .02 INJECTION, SOLUTION INTRAVENOUS at 07:04

## 2022-04-09 RX ADMIN — PIPERACILLIN SODIUM AND TAZOBACTAM SODIUM 4.5 G: 4; .5 INJECTION, POWDER, FOR SOLUTION INTRAVENOUS at 01:04

## 2022-04-09 RX ADMIN — PIPERACILLIN SODIUM AND TAZOBACTAM SODIUM 4.5 G: 4; .5 INJECTION, POWDER, FOR SOLUTION INTRAVENOUS at 09:04

## 2022-04-09 RX ADMIN — MUPIROCIN: 20 OINTMENT TOPICAL at 08:04

## 2022-04-09 NOTE — NURSING
Discharge instructions reviewed with patient and spouse. Verbalized understanding. Telemetry monitor removed and returned to monitor tech. Awaiting home infusion RN to set up dobutamine for home. PICC line flushed. Per pharmacist, covid-19 vaccine is not available at this time. Pt aware that vaccine is not available and if desires dose #4, he will need to obtain as outpatient.

## 2022-04-09 NOTE — PLAN OF CARE
SW rec'd return call from [facility] ([phone]), on call rep with [facility]. Rep informed SW that they will not be able to verify pt's benefits until Monday, 4/11/2022. CM will f/u with [facility] once benefits have been verified. RW, ELIZABETH.

## 2022-04-09 NOTE — PLAN OF CARE
Discharge pending for this evening. Awaiting dobutamine delivery to the hospital-per MD. No acute events throughout shift. VSS. NSR on monitor. Urine remains tea colored. Reports abdominal pain has subsided.

## 2022-04-09 NOTE — PLAN OF CARE
Pt AAOx4. VSS, except for SBP. Pressure this evening was between 60's-70's. Notified provider. Cardiology ordered 250 ml bolus of NS, stop milrinone gtt, and to restart dobutamine gtt at 2.5 ml/hr.  Pt remained free of falls this shift. Pt stated that heart gtts have made him feel fatigue and low energy. And is concerned about his blood pressure being low ever since he was started on cardiac gtt. Medications administered as ordered. Pt is normal sinus in the 70's on monitor. Hourly rounding completed. Pt instructed to call for assistance. POC reviewed. Pt verbalized understanding.

## 2022-04-09 NOTE — SIGNIFICANT EVENT
Patient's BP remains low (60-70s/30-40s) on milrinone drip. Case dsicussed with Dr. Remy (Cardiology), who states: Hold Bp meds.  He is not going to be able to tolerate Milrinone w low BP. Dobutamine is pressor of choice. Stop milrinone. Restart dobutamine. I know his home health infusion company said they had a shortage of dobutamine and wanted milrinone instead. Dont think thats going to work. Will need to go home on dobutamine so I think hospital Med will need to work w [facility] and [facility] to figure out a way to get it. So hell need to stay in hospital until thats figured out.     Milrinone discontinued and dobutamine reordered.         Angie Rodriges, NP

## 2022-04-09 NOTE — PROGRESS NOTES
[facility] - Telemetry ([facility] Medicine  Progress Note    Patient Name: Avery Earl  MRN: [id number]  Patient Class: IP- Inpatient   Admission Date: 4/5/2022  Length of Stay: 1 days  Attending Physician: Kayleen Escalona MD  Primary Care Provider: Keven Cuadra DO        Subjective:     Principal Problem:Pancreatic mass        HPI:  Pt is a 67 yo male with pMHx of CABG, ischemic cardiomyopathy on continuous dobutamine infusion at home, DVT, PAD, HPL, severe mitral insuffiencey and pulmonary hypertension. He presents today due to dark, tea colored urine onset approx 2 weeks and scleral jaundice for approx 1.5 weeks. He endorses intermittent generalized abdominal pain and back pain. Within the last 3 months, pt has lost approx 30 pounds as he had all his teeth extracted due to possible cardiac procedure. He describes KIMBALL and bilateral lower leg swelling.   In the ED, temp 98, pulse 92, resp 14, B/P 86/55 and SpO2 100 %  Labs find H/H 9.5/25.6, Na 132, potassium 3.4, gluc 135, alk phos 977, total bilirubin 18.7 and AST 90 and ALT 62  Urine is tea colored and analysis reflects multiple abnormalities.  CXR - no acute cardiopulmonary findings  Abdominal US - 3.6 cm solid mass seen at the level the head of the pancreas with significant ductal dilatation of the pancreatic duct measuring up to 11 mm.   Moderate intrahepatic and common bile duct dilatation. 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  Recommend ERCP with endoscopic ultrasound and tissue sampling.  ED spoke with Dr. Miller - hold further anticoagulation - full consult pending by GI.   Cardiology to provide opinion regarding cardiac clearance given ischemic cardiomyopathy prior to ERCP.      Overview/Hospital Course:  Patient was placed into observation for suspected pancreatic malignancy. Patient is also on continuous dobutamine drip for ischemic cardiomyopathy. GI consulted-Anticoagulants held  and patient started on zosyn with plans for ERCP Friday. Cardiology consulted for clearance. Palliative care and Pastoral care consulted. Labs and vitals reviewed. Low K this morning- repleted. Continue pain control and IV ABX.   04/07/2022  Labs and vitals reviewed. Family at bedside today. Assisted patient with setting up [employer] account. Appears saddened, flat affect, ready to have some answers. Low BP this morning that improved after IVF. Continue to monitor closely. ERCP planned for tomorrow- scheduled for 1400. NPO after midnight.   04/08: Patient had ERCP this AM. He will be monitored overnight and will dc home tomorrow if stable with OP f/u with Heme/onc. Oral anticoag will be restarted tomorrow morning. Admitted to inpatient. Home health requesting Dobutamine infusion to milrinone infusion due to Dobutamine shortage. Cardiology notified.    4/9- looks and feels much better, comfortable. Last evening he was started on Milrinone gtt but dropped his BP soon which remained low 60-70 systolic while on the infusion, eventually Dr. Remy ordered it stopped and resumed Dobutamine gtt at the same old dose which he tolerated well and his BP improved. He feels much better now, and wishes to go home. His home Infusion team is trying to arrange for Dobutamine gtt to be delivered here and once that happens, he may be discharged home this evening. He is eating drinking better. Liver Bx may be back next week.       Interval History:  looks and feels much better, comfortable. Last evening he was started on Milrinone gtt but dropped his BP soon which remained low 60-70 systolic while on the infusion, eventually Dr. Remy ordered it stopped and resumed Dobutamine gtt at the same old dose which he tolerated well and his BP improved. He feels much better now, and wishes to go home. His home Infusion team is trying to arrange for Dobutamine gtt to be delivered here and once that happens, he may be discharged home this evening.  He is eating drinking better. Liver Bx may be back next week.     Review of Systems   Constitutional:  Positive for unexpected weight change. Negative for chills, fatigue and fever.   HENT:  Positive for dental problem.    Eyes:  Negative for visual disturbance.   Respiratory:  Positive for shortness of breath. Negative for cough.    Cardiovascular:  Positive for leg swelling. Negative for chest pain and palpitations.   Gastrointestinal:  Positive for abdominal pain. Negative for diarrhea, nausea and vomiting.   Genitourinary:         Dark urine   Musculoskeletal:  Positive for back pain.   Skin:  Positive for rash.   Neurological:  Negative for dizziness, weakness and headaches.   Psychiatric/Behavioral: Negative.     All other systems reviewed and are negative.  Objective:     Vital Signs (Most Recent):  Temp: 97.4 °F (36.3 °C) (04/08/22 1505)  Pulse: 75 (04/08/22 1505)  Resp: 20 (04/08/22 1505)  BP: (!) 91/57 (04/08/22 1505)  SpO2: 98 % (04/08/22 1505)   Vital Signs (24h Range):  Temp:  [96.6 °F (35.9 °C)-98.3 °F (36.8 °C)] 97.4 °F (36.3 °C)  Pulse:  [69-85] 75  Resp:  [16-20] 20  SpO2:  [97 %-100 %] 98 %  BP: ()/(47-78) 91/57     Weight: 67.8 kg (149 lb 7.6 oz)  Body mass index is 19.19 kg/m².    Intake/Output Summary (Last 24 hours) at 4/8/2022 1622  Last data filed at 4/8/2022 0116  Gross per 24 hour   Intake 240 ml   Output 250 ml   Net -10 ml      Physical Exam  Vitals and nursing note reviewed. Exam conducted with a chaperone present (Family at ).   Constitutional:       Appearance: He is well-developed. He is ill-appearing.   HENT:      Head: Normocephalic and atraumatic.      Nose: Nose normal.   Eyes:      General: Scleral icterus present.      Pupils: Pupils are equal, round, and reactive to light.   Cardiovascular:      Rate and Rhythm: Normal rate and regular rhythm.      Heart sounds: Murmur heard.     No friction rub. No gallop.   Pulmonary:      Effort: Pulmonary effort is normal.       Breath sounds: Normal breath sounds.   Abdominal:      General: Bowel sounds are normal. There is no distension.      Palpations: Abdomen is soft.      Tenderness: There is no abdominal tenderness. There is no guarding.   Musculoskeletal:         General: No tenderness. Normal range of motion.      Cervical back: Normal range of motion and neck supple.      Right lower leg: Edema present.      Left lower leg: Edema present.   Skin:     General: Skin is warm and dry.      Coloration: Skin is jaundiced.   Neurological:      Mental Status: He is alert and oriented to person, place, and time.   Psychiatric:         Behavior: Behavior normal.       Significant Labs: All pertinent labs within the past 24 hours have been reviewed.  Blood Culture:   BMP:   Recent Labs   Lab 04/08/22  0613   *   *   K 3.4*   CL 98   CO2 23   BUN 13   CREATININE 0.9   CALCIUM 9.4     CBC:   Recent Labs   Lab 04/08/22  0454   WBC 5.33   HGB 10.1*   HCT 27.1*          Significant Imaging: I have reviewed all pertinent imaging results/findings within the past 24 hours.      Assessment/Plan:      * Pancreatic mass  ERCP warranted for biopsy - GI consulted  Hold Eliquis x 2 days  IV ABX  Pain Control    04/09/2022  --ERCP with biopsy today  --will need OP f/u with heme/onc    Await Pancreatic Biopsy          Weakness  inj b12 IM plus IV iron      Microcytic anemia  Monitor CBC  Iron panel pending    S/p 1 unit of blood      On continuous oral anticoagulation  According to Cardiology - okay to hold oral anticoagulation for planned procedure and resume post operatively ASAP  According to Dr. Miller  - Eliquis will need to be held for 2 days      Ischemic cardiomyopathy  On dobutamine  Cards following  Cardiac monitoring    Stable, no AP      Coronary artery disease involving native coronary artery of native heart without angina pectoris  Hold ASA and eliquis  No STATIN due to elevated liver enzymes  On dobutamine  Cards  following        Nonrheumatic mitral valve regurgitation  On dobutamine  Cards consulted        Chronic combined systolic and diastolic congestive heart failure  Continue Entresto  Patient is identified as having Combined Systolic and Diastolic heart failure that is Chronic. CHF is currently controlled. Latest ECHO performed and demonstrates- Results for orders placed during the hospital encounter of 04/05/22    Echo    Interpretation Summary  · The left ventricle is moderately enlarged with severely decreased systolic function.  · The estimated ejection fraction is 15%.  · Grade III left ventricular diastolic dysfunction.  · Normal right ventricular size with moderately reduced right ventricular systolic function.  · There is mild aortic valve stenosis.  · Mild to moderate tricuspid regurgitation.  · Moderate mitral regurgitation.  · Moderate left atrial enlargement.  · The estimated PA systolic pressure is 47 mmHg.  · There is pulmonary hypertension.  . Continue Bumex and aldactone and monitor clinical status closely. Monitor on telemetry. Patient is off CHF pathway.  Monitor strict Is&Os and daily weights.  Place on fluid restriction of 1.5 L. Continue to stress to patient importance of self efficacy and  on diet for CHF. Last BNP reviewed- and noted below   Recent Labs   Lab 04/05/22  1037   *   .  Doing better, has lost a couple of lbs since admission. Will cont Dobutamine as before 2.5 mcg/kg/min      VTE Risk Mitigation (From admission, onward)         Ordered     IP VTE HIGH RISK PATIENT  Once         04/05/22 1452     Place sequential compression device  Until discontinued         04/05/22 1452     Reason for No Pharmacological VTE Prophylaxis  Once        Question:  Reasons:  Answer:  Physician Provided (leave comment)    04/05/22 1452                Discharge Planning   GREG: 4/9/2022     Code Status: Full Code   Is the patient medically ready for discharge?:     Reason for patient still in  hospital (select all that apply): Patient trending condition, Laboratory test, Treatment, Imaging, Consult recommendations and PT / OT recommendations  Discharge Plan A: Home Health        Kayleen Escalona MD  Department of Hospital Medicine   [facility] ([facility])

## 2022-04-09 NOTE — PLAN OF CARE
Orders sent to [facility] for home IV services for review. CM will f/u with Charlene with [facility] once referral has been reviewed. RW, MANESW.

## 2022-04-09 NOTE — ASSESSMENT & PLAN NOTE
ERCP warranted for biopsy - GI consulted  Hold Eliquis x 2 days  IV ABX  Pain Control    04/09/2022  --ERCP with biopsy today  --will need OP f/u with heme/onc    Await Pancreatic Biopsy

## 2022-04-09 NOTE — PROGRESS NOTES
"[facility] - Telemetry ([facility])  Cardiology  Progress Note    Patient Name: Avery Earl  MRN: [id number]  Admission Date: 4/5/2022  Hospital Length of Stay: 1 days  Code Status: Full Code   Attending Physician: Kayleen Escalona MD   Primary Care Physician: Keven Cuadra DO  Expected Discharge Date:   Principal Problem:Pancreatic mass    Subjective:     HPI:  Mr. Earl is a 66 year old male patient whose current medical conditions include chronic systolic CHF (on chronic dobutamine infusion/inotropic therapy), CAD s/p CABG x 2, s/p AICD, PAD, and severe MR who was sent to [facility] ED from cardiology clinic due to jaundice that onset two weeks ago. Patient denied any associated fever, chills, abdominal pain, SOB, or chest pain. 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 Cardiology consulted for pre-op risk assessment. Patient seen and examined today, resting in bed. Feels weak. Reports decreased appetite and weight loss recently along with "pale stools". Chronic KIMBALL, but does not seem to be worse than his norm. No chest pain/angina. He reports compliance with his medications, on Eliquis as OP for unclear reasons (patient states he believes it is due to prior DVT). Recently had LHC at [facility] which showed patent grafts (LIMA to LAD, SVG to RCA) in 3/22./Previously deemed not to be a suitable for candidate for MitralClip or advanced options (LVAD). He has been evaluated by several cardiologists, Dr. Funes, Dr. Barnhart, and Dr. Simpson, most recently was seen by Dr. Infante. Echo pending.  Hospital Course:   4/6/22:  Pt seen and examined this am. CV status seemed stable. No angina. On home dobutamine.  No acute CHF sxs. Awaiting GI workup for " pancreatic mass, possible malignancy. Labs reviewed.    4/8/22-Patient seen and examined, post ERCP with tissue sampling and biliary stent placement. Sleepy, resting comfortably. No CV complaints. BP low overnight, improved this AM. Family at bedside. Labs reviewed, K slightly low being repleted.     4/9/22:  Low BP issues again overnight. Milrinone stopped (was started due to home infusion company requesting change due to Dobutamine shortage).  Dobutamine restarted.  BP improved.  Feels ok this am. No anginal or CHF sxs. Seems in good spirits.  Labs reviewed.          Review of Systems   Constitutional: Positive for malaise/fatigue.   HENT: Negative.     Eyes: Negative.    Cardiovascular: Negative.    Respiratory: Negative.     Endocrine: Negative.    Hematologic/Lymphatic: Negative.    Skin: Negative.    Musculoskeletal: Negative.    Gastrointestinal: Negative.    Genitourinary: Negative.    Neurological:  Positive for weakness.   Psychiatric/Behavioral: Negative.     Allergic/Immunologic: Negative.    Objective:     Vital Signs (Most Recent):  Temp: 98 °F (36.7 °C) (04/09/22 0904)  Pulse: 67 (04/09/22 0904)  Resp: 18 (04/09/22 0904)  BP: (!) 83/52 (04/09/22 0904)  SpO2: 99 % (04/09/22 0904)   Vital Signs (24h Range):  Temp:  [97.4 °F (36.3 °C)-98 °F (36.7 °C)] 98 °F (36.7 °C)  Pulse:  [67-78] 67  Resp:  [18-20] 18  SpO2:  [97 %-100 %] 99 %  BP: (62-91)/(32-57) 83/52     Weight: 67.8 kg (149 lb 7.6 oz)  Body mass index is 19.19 kg/m².     SpO2: 99 %  O2 Device (Oxygen Therapy): room air      Intake/Output Summary (Last 24 hours) at 4/9/2022 1133  Last data filed at 4/9/2022 0504  Gross per 24 hour   Intake 2897.92 ml   Output --   Net 2897.92 ml       Lines/Drains/Airways       Peripherally Inserted Central Catheter Line  Duration             PICC Double Lumen right brachial -- days                    Physical Exam  Vitals and nursing note reviewed.   Constitutional:       Appearance: He is well-developed. He is  ill-appearing.   HENT:      Head: Normocephalic.   Neck:      Vascular: No carotid bruit or JVD.   Cardiovascular:      Rate and Rhythm: Normal rate and regular rhythm.      Pulses:           Radial pulses are 2+ on the right side and 2+ on the left side.      Heart sounds: S1 normal and S2 normal. Heart sounds not distant. No midsystolic click and no opening snap. Murmur heard.   Medium-pitched midsystolic murmur is present with a grade of 2/6 at the upper left sternal border.     No friction rub. No S3 or S4 sounds.   Pulmonary:      Effort: Pulmonary effort is normal.      Breath sounds: Normal breath sounds. No wheezing or rales.   Abdominal:      General: Bowel sounds are normal. There is no distension.      Palpations: Abdomen is soft.      Tenderness: There is no abdominal tenderness.   Musculoskeletal:      Cervical back: Neck supple.   Skin:     General: Skin is warm.   Neurological:      Mental Status: He is alert.   Psychiatric:         Behavior: Behavior normal.       Significant Labs: ABG: No results for input(s): PH, PCO2, HCO3, POCSATURATED, BE in the last 48 hours., Blood Culture: No results for input(s): LABBLOO in the last 48 hours., BMP:   Recent Labs   Lab 04/08/22  0613   *   *   K 3.4*   CL 98   CO2 23   BUN 13   CREATININE 0.9   CALCIUM 9.4   , CMP   Recent Labs   Lab 04/08/22  0613   *   K 3.4*   CL 98   CO2 23   *   BUN 13   CREATININE 0.9   CALCIUM 9.4   ANIONGAP 12   ESTGFRAFRICA >60   EGFRNONAA >60   , CBC   Recent Labs   Lab 04/08/22  0454   WBC 5.33   HGB 10.1*   HCT 27.1*      , INR No results for input(s): INR, PROTIME in the last 48 hours., Lipid Panel No results for input(s): CHOL, HDL, LDLCALC, TRIG, CHOLHDL in the last 48 hours., and Troponin No results for input(s): TROPONINI in the last 48 hours.    Significant Imaging: Echocardiogram: Transthoracic echo (TTE) complete (Cupid Only):   Results for orders placed or performed during the hospital  encounter of 04/05/22   Echo   Result Value Ref Range    BSA 2.16 m2    TDI SEPTAL 0.05 m/s    LV LATERAL E/E' RATIO 11.20 m/s    LV SEPTAL E/E' RATIO 22.40 m/s    LA WIDTH 4.67 cm    IVC diameter 1.42 cm    Left Ventricular Outflow Tract Mean Velocity 0.24507843334 cm/s    Left Ventricular Outflow Tract Mean Gradient 1.07 mmHg    TDI LATERAL 0.10 m/s    LVIDd 6.40 (A) 3.5 - 6.0 cm    IVS 1.27 (A) 0.6 - 1.1 cm    Posterior Wall 1.33 (A) 0.6 - 1.1 cm    Ao root annulus 3.83 cm    LVIDs 6.19 (A) 2.1 - 4.0 cm    FS 3 28 - 44 %    LA volume 89.94 cm3    Sinus 3.67 cm    STJ 3.29 cm    Ascending aorta 3.17 cm    LV mass 389.02 g    LA size 4.15 cm    TAPSE 1.81 cm    Left Ventricle Relative Wall Thickness 0.42 cm    AV mean gradient 13 mmHg    AV valve area 1.11 cm2    AV Velocity Ratio 0.29     AV index (prosthetic) 0.33     MV valve area p 1/2 method 5.14 cm2    E/A ratio 3.29     Mean e' 0.08 m/s    E wave deceleration time 147.225432535732789 msec    IVRT 62.716744725684280 msec    LVOT diameter 2.07 cm    LVOT area 3.4 cm2    LVOT peak milan 0.68 m/s    LVOT peak VTI 14.20 cm    Ao peak milan 2.37 m/s    Ao VTI 43.0 cm    RVOT peak milan 0.71 m/s    RVOT peak VTI 10.4 cm    Mr max milan 4.84 m/s    LVOT stroke volume 47.76 cm3    AV peak gradient 22 mmHg    PV mean gradient 1.29 mmHg    E/E' ratio 14.93 m/s    MV Peak E Milan 1.12 m/s    TR Max Milan 3.33 m/s    MV stenosis pressure 1/2 time 42.025611816816253 ms    MV Peak A Milan 0.34 m/s    LV Systolic Volume 193.28 mL    LV Systolic Volume Index 89.5 mL/m2    LV Diastolic Volume 208.66 mL    LV Diastolic Volume Index 96.60 mL/m2    LA Volume Index 41.6 mL/m2    LV Mass Index 180 g/m2    Echo EF Estimated 7 %    RA Major Axis 5.42 cm    Left Atrium Minor Axis 4.67 cm    Left Atrium Major Axis 6.57 cm    Triscuspid Valve Regurgitation Peak Gradient 44 mmHg    RA Width 5.42 cm    Right Atrial Pressure (from IVC) 3 mmHg    EF 15 %    TV rest pulmonary artery pressure 47 mmHg     Narrative    · The left ventricle is moderately enlarged with severely decreased   systolic function.  · The estimated ejection fraction is 15%.  · Grade III left ventricular diastolic dysfunction.  · Normal right ventricular size with moderately reduced right ventricular   systolic function.  · There is mild aortic valve stenosis.  · Mild to moderate tricuspid regurgitation.  · Moderate mitral regurgitation.  · Moderate left atrial enlargement.  · The estimated PA systolic pressure is 47 mmHg.  · There is pulmonary hypertension.        Assessment and Plan:     DOBUTAMINE GTT RESTARTED.  PT IS NOT GOING TO BE ABLE TO TOLERATE MILRINONE WITH LOW BP ISSUES.  HOSPITAL MED ADVISED TO COORDINATE CARE WITH [facility]/HOME HEALTH TO ENSURE PT CAN GET HOME DOBUTAMINE GTT AS HE IS INOTROPE DEPENDENT IT SEEMS.  RESTART CHF MEDS AS BP ALLOWS.  WILL NEED LOWER DOSE OF ENTRESTO.  F/U IN CHF CLINIC AND WITH DR. STEEL (CARDIOLOGY) ASAP AFTER DISCHARGE.    * Pancreatic mass  -s/p ERCP with tissue sampling and biliary stent placement today  -Awaiting pathology results    On continuous oral anticoagulation  -Patient on Eliquis as OP for possible DVT  -Ok to hold for planned procedure and resume post-operatively ASAP    Pre-op evaluation  -Patient who presents with obstructive jaundice/pancreatic head mass concerning for underlying malignancy  -ERCP warranted with tissue sampling  -Known history of severe MR/severe ICM/CHF, on chronic inotropic therapy  -Echo pending  -Recent LHC 3/22 at [facility] showed patent grafts  -High risk of tyra and post OP CV complications for any procedure given co-morbidities/cardiac status    Ischemic cardiomyopathy  -Stable, compensated, on chronic dobutamine infusion  -Continue Bumex, Entresto, Aldactone as tolerated  -No BB given inotropic support    4/8/22  -Build back home regimen as tolerated    Coronary artery disease involving native coronary artery of native heart without angina pectoris  -No  angina  -s/p recent LHC in 3/22 at [facility] which showed patent grafts  -Hold ASA/Plavix given impending procedure, resume post-op ASAP  -No statin given jaundice    4/8/22  -Would resume single agent DAPT as patient is also on Eliquis as OP    Nonrheumatic mitral valve regurgitation  -Known severe MR, previously turned down for Mitral clip  -Repeat echo pending    Chronic combined systolic and diastolic congestive heart failure  -Compensated  -Echo pending  -Continue Buemex, Aldactone, Entresto as tolerated  -No BB given inotropic support    4/8/22  -Meds held given hypotension, resume regimen as tolerated  -Will likely need lower dose of Entresto 24-26 mg BID  -Reassess in AM  -Continue inotropic support/on chronic dobutamine infusion. Would avoid switching to milrinone if possible to prevent any cardiac instability.        VTE Risk Mitigation (From admission, onward)         Ordered     IP VTE HIGH RISK PATIENT  Once         04/05/22 1452     Place sequential compression device  Until discontinued         04/05/22 1452     Reason for No Pharmacological VTE Prophylaxis  Once        Question:  Reasons:  Answer:  Physician Provided (leave comment)    04/05/22 1452                Nathaniel Remy MD  Cardiology  [facility] - Telemetry ([facility])

## 2022-04-09 NOTE — SUBJECTIVE & OBJECTIVE
Review of Systems   Constitutional: Positive for malaise/fatigue.   HENT: Negative.     Eyes: Negative.    Cardiovascular: Negative.    Respiratory: Negative.     Endocrine: Negative.    Hematologic/Lymphatic: Negative.    Skin: Negative.    Musculoskeletal: Negative.    Gastrointestinal: Negative.    Genitourinary: Negative.    Neurological:  Positive for weakness.   Psychiatric/Behavioral: Negative.     Allergic/Immunologic: Negative.    Objective:     Vital Signs (Most Recent):  Temp: 98 °F (36.7 °C) (04/09/22 0904)  Pulse: 67 (04/09/22 0904)  Resp: 18 (04/09/22 0904)  BP: (!) 83/52 (04/09/22 0904)  SpO2: 99 % (04/09/22 0904)   Vital Signs (24h Range):  Temp:  [97.4 °F (36.3 °C)-98 °F (36.7 °C)] 98 °F (36.7 °C)  Pulse:  [67-78] 67  Resp:  [18-20] 18  SpO2:  [97 %-100 %] 99 %  BP: (62-91)/(32-57) 83/52     Weight: 67.8 kg (149 lb 7.6 oz)  Body mass index is 19.19 kg/m².     SpO2: 99 %  O2 Device (Oxygen Therapy): room air      Intake/Output Summary (Last 24 hours) at 4/9/2022 1133  Last data filed at 4/9/2022 0504  Gross per 24 hour   Intake 2897.92 ml   Output --   Net 2897.92 ml       Lines/Drains/Airways       Peripherally Inserted Central Catheter Line  Duration             PICC Double Lumen right brachial -- days                    Physical Exam  Vitals and nursing note reviewed.   Constitutional:       Appearance: He is well-developed. He is ill-appearing.   HENT:      Head: Normocephalic.   Neck:      Vascular: No carotid bruit or JVD.   Cardiovascular:      Rate and Rhythm: Normal rate and regular rhythm.      Pulses:           Radial pulses are 2+ on the right side and 2+ on the left side.      Heart sounds: S1 normal and S2 normal. Heart sounds not distant. No midsystolic click and no opening snap. Murmur heard.   Medium-pitched midsystolic murmur is present with a grade of 2/6 at the upper left sternal border.     No friction rub. No S3 or S4 sounds.   Pulmonary:      Effort: Pulmonary effort is  normal.      Breath sounds: Normal breath sounds. No wheezing or rales.   Abdominal:      General: Bowel sounds are normal. There is no distension.      Palpations: Abdomen is soft.      Tenderness: There is no abdominal tenderness.   Musculoskeletal:      Cervical back: Neck supple.   Skin:     General: Skin is warm.   Neurological:      Mental Status: He is alert.   Psychiatric:         Behavior: Behavior normal.       Significant Labs: ABG: No results for input(s): PH, PCO2, HCO3, POCSATURATED, BE in the last 48 hours., Blood Culture: No results for input(s): LABBLOO in the last 48 hours., BMP:   Recent Labs   Lab 04/08/22  0613   *   *   K 3.4*   CL 98   CO2 23   BUN 13   CREATININE 0.9   CALCIUM 9.4   , CMP   Recent Labs   Lab 04/08/22  0613   *   K 3.4*   CL 98   CO2 23   *   BUN 13   CREATININE 0.9   CALCIUM 9.4   ANIONGAP 12   ESTGFRAFRICA >60   EGFRNONAA >60   , CBC   Recent Labs   Lab 04/08/22  0454   WBC 5.33   HGB 10.1*   HCT 27.1*      , INR No results for input(s): INR, PROTIME in the last 48 hours., Lipid Panel No results for input(s): CHOL, HDL, LDLCALC, TRIG, CHOLHDL in the last 48 hours., and Troponin No results for input(s): TROPONINI in the last 48 hours.    Significant Imaging: Echocardiogram: Transthoracic echo (TTE) complete (Cupid Only):   Results for orders placed or performed during the hospital encounter of 04/05/22   Echo   Result Value Ref Range    BSA 2.16 m2    TDI SEPTAL 0.05 m/s    LV LATERAL E/E' RATIO 11.20 m/s    LV SEPTAL E/E' RATIO 22.40 m/s    LA WIDTH 4.67 cm    IVC diameter 1.42 cm    Left Ventricular Outflow Tract Mean Velocity 0.92498336684 cm/s    Left Ventricular Outflow Tract Mean Gradient 1.07 mmHg    TDI LATERAL 0.10 m/s    LVIDd 6.40 (A) 3.5 - 6.0 cm    IVS 1.27 (A) 0.6 - 1.1 cm    Posterior Wall 1.33 (A) 0.6 - 1.1 cm    Ao root annulus 3.83 cm    LVIDs 6.19 (A) 2.1 - 4.0 cm    FS 3 28 - 44 %    LA volume 89.94 cm3    Sinus 3.67 cm     STJ 3.29 cm    Ascending aorta 3.17 cm    LV mass 389.02 g    LA size 4.15 cm    TAPSE 1.81 cm    Left Ventricle Relative Wall Thickness 0.42 cm    AV mean gradient 13 mmHg    AV valve area 1.11 cm2    AV Velocity Ratio 0.29     AV index (prosthetic) 0.33     MV valve area p 1/2 method 5.14 cm2    E/A ratio 3.29     Mean e' 0.08 m/s    E wave deceleration time 147.043923508967692 msec    IVRT 62.456577831942206 msec    LVOT diameter 2.07 cm    LVOT area 3.4 cm2    LVOT peak milan 0.68 m/s    LVOT peak VTI 14.20 cm    Ao peak milan 2.37 m/s    Ao VTI 43.0 cm    RVOT peak milan 0.71 m/s    RVOT peak VTI 10.4 cm    Mr max milan 4.84 m/s    LVOT stroke volume 47.76 cm3    AV peak gradient 22 mmHg    PV mean gradient 1.29 mmHg    E/E' ratio 14.93 m/s    MV Peak E Milan 1.12 m/s    TR Max Milan 3.33 m/s    MV stenosis pressure 1/2 time 42.319682563525860 ms    MV Peak A Milan 0.34 m/s    LV Systolic Volume 193.28 mL    LV Systolic Volume Index 89.5 mL/m2    LV Diastolic Volume 208.66 mL    LV Diastolic Volume Index 96.60 mL/m2    LA Volume Index 41.6 mL/m2    LV Mass Index 180 g/m2    Echo EF Estimated 7 %    RA Major Axis 5.42 cm    Left Atrium Minor Axis 4.67 cm    Left Atrium Major Axis 6.57 cm    Triscuspid Valve Regurgitation Peak Gradient 44 mmHg    RA Width 5.42 cm    Right Atrial Pressure (from IVC) 3 mmHg    EF 15 %    TV rest pulmonary artery pressure 47 mmHg    Narrative    · The left ventricle is moderately enlarged with severely decreased   systolic function.  · The estimated ejection fraction is 15%.  · Grade III left ventricular diastolic dysfunction.  · Normal right ventricular size with moderately reduced right ventricular   systolic function.  · There is mild aortic valve stenosis.  · Mild to moderate tricuspid regurgitation.  · Moderate mitral regurgitation.  · Moderate left atrial enlargement.  · The estimated PA systolic pressure is 47 mmHg.  · There is pulmonary hypertension.

## 2022-04-09 NOTE — ASSESSMENT & PLAN NOTE
Continue Entresto  Patient is identified as having Combined Systolic and Diastolic heart failure that is Chronic. CHF is currently controlled. Latest ECHO performed and demonstrates- Results for orders placed during the hospital encounter of 04/05/22    Echo    Interpretation Summary  · The left ventricle is moderately enlarged with severely decreased systolic function.  · The estimated ejection fraction is 15%.  · Grade III left ventricular diastolic dysfunction.  · Normal right ventricular size with moderately reduced right ventricular systolic function.  · There is mild aortic valve stenosis.  · Mild to moderate tricuspid regurgitation.  · Moderate mitral regurgitation.  · Moderate left atrial enlargement.  · The estimated PA systolic pressure is 47 mmHg.  · There is pulmonary hypertension.  . Continue Bumex and aldactone and monitor clinical status closely. Monitor on telemetry. Patient is off CHF pathway.  Monitor strict Is&Os and daily weights.  Place on fluid restriction of 1.5 L. Continue to stress to patient importance of self efficacy and  on diet for CHF. Last BNP reviewed- and noted below   Recent Labs   Lab 04/05/22  1037   *   .  Doing better, has lost a couple of lbs since admission. Will cont Dobutamine as before 2.5 mcg/kg/min

## 2022-04-10 NOTE — DISCHARGE SUMMARY
[facility] - Telemetry ([facility] Medicine  Discharge Summary      Patient Name: Avery Earl  MRN: [id number]  Patient Class: IP- Inpatient  Admission Date: 4/5/2022  Hospital Length of Stay: 1 days  Discharge Date and Time:  04/10/2022 5:38 PM  Attending Physician: No att. providers found   Discharging Provider: Kayleen Escalona MD  Primary Care Provider: Keven Cuadra DO      HPI:   Pt is a 67 yo male with pMHx of CABG, ischemic cardiomyopathy on continuous dobutamine infusion at home, DVT, PAD, HPL, severe mitral insuffiencey and pulmonary hypertension. He presents today due to dark, tea colored urine onset approx 2 weeks and scleral jaundice for approx 1.5 weeks. He endorses intermittent generalized abdominal pain and back pain. Within the last 3 months, pt has lost approx 30 pounds as he had all his teeth extracted due to possible cardiac procedure. He describes KIMBALL and bilateral lower leg swelling.   In the ED, temp 98, pulse 92, resp 14, B/P 86/55 and SpO2 100 %  Labs find H/H 9.5/25.6, Na 132, potassium 3.4, gluc 135, alk phos 977, total bilirubin 18.7 and AST 90 and ALT 62  Urine is tea colored and analysis reflects multiple abnormalities.  CXR - no acute cardiopulmonary findings  Abdominal US - 3.6 cm solid mass seen at the level the head of the pancreas with significant ductal dilatation of the pancreatic duct measuring up to 11 mm.   Moderate intrahepatic and common bile duct dilatation. 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  Recommend ERCP with endoscopic ultrasound and tissue sampling.  ED spoke with Dr. Miller - hold further anticoagulation - full consult pending by GI.   Cardiology to provide opinion regarding cardiac clearance given ischemic cardiomyopathy prior to ERCP.      Procedure(s) (LRB):  ERCP (ENDOSCOPIC RETROGRADE CHOLANGIOPANCREATOGRAPHY) (N/A)  ULTRASOUND, UPPER GI TRACT, ENDOSCOPIC (N/A)      Hospital Course:   Patient was  placed into observation for suspected pancreatic malignancy. Patient is also on continuous dobutamine drip for ischemic cardiomyopathy. GI consulted-Anticoagulants held and patient started on zosyn with plans for ERCP Friday. Cardiology consulted for clearance. Palliative care and Pastoral care consulted. Labs and vitals reviewed. Low K this morning- repleted. Continue pain control and IV ABX.   04/07/2022  Labs and vitals reviewed. Family at bedside today. Assisted patient with setting up [employer] account. Appears saddened, flat affect, ready to have some answers. Low BP this morning that improved after IVF. Continue to monitor closely. ERCP planned for tomorrow- scheduled for 1400. NPO after midnight.   04/08: Patient had ERCP this AM. He will be monitored overnight and will dc home tomorrow if stable with OP f/u with Heme/onc. Oral anticoag will be restarted tomorrow morning. Admitted to inpatient. Home health requesting Dobutamine infusion to milrinone infusion due to Dobutamine shortage. Cardiology notified.    4/9- looks and feels much better, comfortable. Last evening he was started on Milrinone gtt but dropped his BP soon which remained low 60-70 systolic while on the infusion, eventually Dr. Remy ordered it stopped and resumed Dobutamine gtt at the same old dose which he tolerated well and his BP improved. He feels much better now, and wishes to go home. His home Infusion team is trying to arrange for Dobutamine gtt to be delivered here and once that happens, he may be discharged home this evening. He is eating drinking better. Liver Bx may be back next week. [facility] able to bring his Dobutamine infusion to his room and he was hooked up with it. He felt and fine and was ready for discharge. He was seen and examined and deemed stable for discharge home today.           Goals of Care Treatment Preferences:  Code Status: Full Code    Health care agent[facility]  [facility] care agent  number: [phone]                   Consults:   Consults (From admission, onward)        Status Ordering Provider     Inpatient consult to Spiritual Care  Once        Provider:  (Not yet assigned)    Completed LUCA MORALES     Inpatient consult to Palliative Care  Once        Provider:  Sonia Seay PA-C    Completed MARQUITA MIJARES     Inpatient consult to Cardiology  Once        Provider:  (Not yet assigned)    Completed CLEMENTE CAPELLAN     Inpatient consult to Gastroenterology  Once        Provider:  (Not yet assigned)    Completed EDUARD JOSEPH          No new Assessment & Plan notes have been filed under this hospital service since the last note was generated.  Service: Hospital Medicine    Final Active Diagnoses:    Diagnosis Date Noted POA    PRINCIPAL PROBLEM:  Pancreatic mass [K86.89] 04/05/2022 Yes    Weakness [R53.1] 04/09/2022 Yes    On continuous oral anticoagulation [Z79.01] 04/05/2022 Not Applicable    Microcytic anemia [D50.9] 04/05/2022 Yes    Nonrheumatic mitral valve regurgitation [I34.0] 01/02/2022 Yes    Ischemic cardiomyopathy [I25.5] 01/02/2022 Yes    Coronary artery disease involving native coronary artery of native heart without angina pectoris [I25.10] 01/02/2022 Yes    Chronic combined systolic and diastolic congestive heart failure [I50.42]  Yes      Problems Resolved During this Admission:    Diagnosis Date Noted Date Resolved POA    Jaundice [R17] 04/09/2022 04/09/2022 Yes    Pre-op evaluation [Z01.818] 04/05/2022 04/09/2022 Not Applicable       Discharged Condition: fair    Disposition: Home or Self Care    Follow Up:   Follow-up Information     Keven Cuadra DO Follow up in 3 day(s).    Specialty: Family Medicine  Why: Hospital follow up, As needed  Contact information:  [address] [place] [address]  [phone]             Frances Infante MD Follow up in 1 week(s).    Specialties: Interventional Cardiology, Cardiology  Why:  Hospital follow up  Contact information:  [address]  [place] [address]  [phone]             Merritt Darby MD Follow up in 1 week(s).    Specialty: Gastroenterology  Why: Hospital follow up  Contact information:  [address] [place] [address]  [phone]                       Patient Instructions:      Diet Cardiac     Activity as tolerated       Significant Diagnostic Studies: Labs:   Recent Results (from the past 336 hour(s))   CBC Auto Differential    Collection Time: 04/08/22  4:54 AM   Result Value Ref Range    WBC 5.33 3.90 - 12.70 K/uL    Hemoglobin 10.1 (L) 14.0 - 18.0 g/dL    Hematocrit 27.1 (L) 40.0 - 54.0 %    Platelets 257 150 - 450 K/uL   CBC with Automated Differential    Collection Time: 04/07/22  5:23 AM   Result Value Ref Range    WBC 4.94 3.90 - 12.70 K/uL    Hemoglobin 9.6 (L) 14.0 - 18.0 g/dL    Hematocrit 26.5 (L) 40.0 - 54.0 %    Platelets 277 150 - 450 K/uL   CBC with Automated Differential    Collection Time: 04/06/22  3:46 AM   Result Value Ref Range    WBC 5.14 3.90 - 12.70 K/uL    Hemoglobin 9.0 (L) 14.0 - 18.0 g/dL    Hematocrit 24.0 (L) 40.0 - 54.0 %    Platelets 249 150 - 450 K/uL     CMP  Sodium   Date Value Ref Range Status   04/08/2022 133 (L) 136 - 145 mmol/L Final     Potassium   Date Value Ref Range Status   04/08/2022 3.4 (L) 3.5 - 5.1 mmol/L Final     Chloride   Date Value Ref Range Status   04/08/2022 98 95 - 110 mmol/L Final     CO2   Date Value Ref Range Status   04/08/2022 23 23 - 29 mmol/L Final     Glucose   Date Value Ref Range Status   04/08/2022 116 (H) 70 - 110 mg/dL Final     BUN   Date Value Ref Range Status   04/08/2022 13 8 - 23 mg/dL Final     Creatinine   Date Value Ref Range Status   04/08/2022 0.9 0.5 - 1.4 mg/dL Final     Calcium   Date Value Ref Range Status   04/08/2022 9.4 8.7 - 10.5 mg/dL Final     Total Protein   Date Value Ref Range Status   04/07/2022 6.1 6.0 - 8.4 g/dL Final     Albumin   Date Value Ref  Range Status   04/07/2022 2.0 (L) 3.5 - 5.2 g/dL Final     Total Bilirubin   Date Value Ref Range Status   04/07/2022 19.5 (H) 0.1 - 1.0 mg/dL Final     Comment:     For infants and newborns, interpretation of results should be based  on gestational age, weight and in agreement with clinical  observations.    Premature Infant recommended reference ranges:  Up to 24 hours.............<8.0 mg/dL  Up to 48 hours............<12.0 mg/dL  3-5 days..................<15.0 mg/dL  6-29 days.................<15.0 mg/dL       Alkaline Phosphatase   Date Value Ref Range Status   04/07/2022 936 (H) 55 - 135 U/L Final     AST   Date Value Ref Range Status   04/07/2022 73 (H) 10 - 40 U/L Final     ALT   Date Value Ref Range Status   04/07/2022 52 (H) 10 - 44 U/L Final     Anion Gap   Date Value Ref Range Status   04/08/2022 12 8 - 16 mmol/L Final     eGFR if    Date Value Ref Range Status   04/08/2022 >60 >60 mL/min/1.73 m^2 Final     eGFR if non    Date Value Ref Range Status   04/08/2022 >60 >60 mL/min/1.73 m^2 Final     Comment:     Calculation used to obtain the estimated glomerular filtration  rate (eGFR) is the CKD-EPI equation.      and All labs within the past 24 hours have been reviewed    Pending Diagnostic Studies:     Procedure Component Value Units Date/Time    Cytology, Fluid/Wash/Brush [[id number]] Collected: 04/08/22 1024    Order Status: Sent Lab Status: In process Updated: 04/08/22 1128         Medications:   Reconciled Home Medications:      Medication List      START taking these medications    amoxicillin-clavulanate 875-125mg 875-125 mg per tablet  Commonly known as: AUGMENTIN  Take 1 tablet by mouth every 12 (twelve) hours.     DOBUTamine 1,000 mg/250 mL (4,000 mcg/mL) infusion  Commonly known as: DOBUTREX  Inject 227 mcg/min into the vein continuous.        CONTINUE taking these medications    apixaban 5 mg Tab  Commonly known as: ELIQUIS  Take 5 mg by mouth once daily.      aspirin 81 MG Chew  Take 81 mg by mouth once daily.     b complex vitamins capsule  Take 1 capsule by mouth once daily.     bumetanide 1 MG tablet  Commonly known as: BUMEX  Take 1 mg by mouth once daily.     cholecalciferol (vitamin D3) 25 mcg (1,000 unit) capsule  Commonly known as: VITAMIN D3  Take 1,000 Units by mouth once daily.     ENTRESTO 49-51 mg per tablet  Generic drug: sacubitriL-valsartan  Take 49-51 tablets by mouth 2 (two) times a day.     OMEGA-3-DHA-EPA-ALA-VIT D3 ORAL  Take 2 capsules by mouth once daily at 6am.     potassium chloride SA 20 MEQ tablet  Commonly known as: K-DUR,KLOR-CON  Take 20 mEq by mouth once daily.     spironolactone 25 MG tablet  Commonly known as: ALDACTONE  Take 25 mg by mouth once daily.            Indwelling Lines/Drains at time of discharge:   Lines/Drains/Airways     Peripherally Inserted Central Catheter Line  Duration           PICC Double Lumen right brachial -- days                Time spent on the discharge of patient: 55 minutes         Kayleen Escalona MD  Department of Hospital Medicine  [facility] - Telemetry ([facility])

## 2022-04-11 DIAGNOSIS — I50.22 CHRONIC SYSTOLIC HEART FAILURE: Primary | ICD-10-CM

## 2022-04-11 NOTE — PLAN OF CARE
[facility] could not resume services due to staff shortage and just notified us at 2:04pm today.  Referrals sent to [facility].  They are able to accept the patient.  Referral sent in [facility].       04/11/22 1501   Post-Acute Status   Post-Acute Authorization [facility] Status Set-up Complete/Auth obtained   Discharge Plan   Discharge Plan A Home Health     Spoke to [place] with [facility].  They did re-start patient on Dobutamine on Saturday.  Advised [facility] accepted patient.

## 2022-04-11 NOTE — PLAN OF CARE
[facility] - Telemetry (Hospital)  Discharge Final Note    Primary Care Provider: Keven Cuadra DO    Expected Discharge Date: 4/9/2022    Final Discharge Note (most recent)     Final Note - 04/11/22 1504        Final Note    Assessment Type Final Discharge Note     Anticipated Discharge Disposition Home-Health Care Sv                 Important Message from Medicare             Contact Info     Keven Cuadra DO   Specialty: Family Medicine   Relationship: PCP - General    [address] [place] [address]  [address]  [place] [address]   Phone: [phone]       Next Steps: Follow up in 3 day(s)    Instructions: Hospital follow up, As needed    Frances Infante MD   Specialty: Interventional Cardiology, Cardiology    [id number] [place] [address]  [place] [address]   Phone: [phone]       Next Steps: Follow up in 1 week(s)    Instructions: Hospital follow up    Merritt Darby MD   Specialty: Gastroenterology    [address] [place] [address] [address] [place] [address]   Phone: [phone]       Next Steps: Follow up in 1 week(s)    Instructions: Hospital follow up        Discharged with [facility] and [facility].

## 2022-04-12 ENCOUNTER — TELEPHONE (OUTPATIENT)
Dept: GASTROENTEROLOGY | Facility: CLINIC | Age: 67
End: 2022-04-12
Payer: MEDICARE

## 2022-04-12 NOTE — TELEPHONE ENCOUNTER
----- Message from Abner Murphy sent at 4/12/2022  9:23 AM CDT -----  Contact: self  Avery Earl would like a call back at [phone], in regards to scheduling a follow up visit. Pt states that he was told to be seen within a week, and he would like to be seen on Friday 04/15/22.

## 2022-04-13 LAB
COMMENT: ABNORMAL
FINAL PATHOLOGIC DIAGNOSIS: ABNORMAL
Lab: ABNORMAL

## 2022-04-18 NOTE — PHYSICIAN QUERY
PT Name: Avery Earl  MR #: [id number]    DOCUMENTATION CLARIFICATION     CDS: Bharati Mcduffie RN        Contact information:[email or web address] or (cell) [phone]    This form is a permanent document in the medical record.     Query Date: April 18, 2022    By submitting this query, we are merely seeking further clarification of documentation.  Please utilize your independent clinical judgment when addressing the question(s) below.    The medical record contains the following:  Pathology Findings Location in Medical Record   1. Bile duct stricture, biopsy:   - Minute fragment of benign bile duct epithelium,  negative  for dysplasia or   malignancy     2. Common bile duct brushing (1 thin prep):   -  Positive  for malignancy   Surgical Path 4/8       Please clarify:    [ xx ] Pathology findings noted above are ruled in/confirmed as diagnoses     [  ] Pathology findings noted above are not confirmed as diagnoses     [  ] Other diagnosis (please specify): ___________     [  ] Clinically Undetermined           Form No. [id number]

## 2022-04-19 ENCOUNTER — OFFICE VISIT (OUTPATIENT)
Dept: TRANSPLANT | Facility: CLINIC | Age: 67
End: 2022-04-19
Payer: COMMERCIAL

## 2022-04-19 VITALS
DIASTOLIC BLOOD PRESSURE: 62 MMHG | HEART RATE: 78 BPM | BODY MASS INDEX: 24.57 KG/M2 | SYSTOLIC BLOOD PRESSURE: 110 MMHG | OXYGEN SATURATION: 97 % | WEIGHT: 191.38 LBS

## 2022-04-19 DIAGNOSIS — I34.0 NONRHEUMATIC MITRAL VALVE REGURGITATION: ICD-10-CM

## 2022-04-19 DIAGNOSIS — I73.9 CLAUDICATION IN PERIPHERAL VASCULAR DISEASE: ICD-10-CM

## 2022-04-19 DIAGNOSIS — E78.5 HYPERLIPIDEMIA LDL GOAL <70: ICD-10-CM

## 2022-04-19 DIAGNOSIS — I25.5 ISCHEMIC CARDIOMYOPATHY: ICD-10-CM

## 2022-04-19 DIAGNOSIS — I25.10 CORONARY ARTERY DISEASE INVOLVING NATIVE CORONARY ARTERY OF NATIVE HEART WITHOUT ANGINA PECTORIS: ICD-10-CM

## 2022-04-19 DIAGNOSIS — I50.42 CHRONIC COMBINED SYSTOLIC AND DIASTOLIC CONGESTIVE HEART FAILURE: Primary | ICD-10-CM

## 2022-04-19 PROCEDURE — 3078F DIAST BP <80 MM HG: CPT | Mod: CPTII,S$GLB,, | Performed by: INTERNAL MEDICINE

## 2022-04-19 PROCEDURE — 3008F BODY MASS INDEX DOCD: CPT | Mod: CPTII,S$GLB,, | Performed by: INTERNAL MEDICINE

## 2022-04-19 PROCEDURE — 1160F PR REVIEW ALL MEDS BY PRESCRIBER/CLIN PHARMACIST DOCUMENTED: ICD-10-PCS | Mod: CPTII,S$GLB,, | Performed by: INTERNAL MEDICINE

## 2022-04-19 PROCEDURE — 1157F ADVNC CARE PLAN IN RCRD: CPT | Mod: CPTII,S$GLB,, | Performed by: INTERNAL MEDICINE

## 2022-04-19 PROCEDURE — 3078F PR MOST RECENT DIASTOLIC BLOOD PRESSURE < 80 MM HG: ICD-10-PCS | Mod: CPTII,S$GLB,, | Performed by: INTERNAL MEDICINE

## 2022-04-19 PROCEDURE — 99214 PR OFFICE/OUTPT VISIT, EST, LEVL IV, 30-39 MIN: ICD-10-PCS | Mod: S$GLB,,, | Performed by: INTERNAL MEDICINE

## 2022-04-19 PROCEDURE — 1159F PR MEDICATION LIST DOCUMENTED IN MEDICAL RECORD: ICD-10-PCS | Mod: CPTII,S$GLB,, | Performed by: INTERNAL MEDICINE

## 2022-04-19 PROCEDURE — 3074F PR MOST RECENT SYSTOLIC BLOOD PRESSURE < 130 MM HG: ICD-10-PCS | Mod: CPTII,S$GLB,, | Performed by: INTERNAL MEDICINE

## 2022-04-19 PROCEDURE — 99999 PR PBB SHADOW E&M-EST. PATIENT-LVL III: CPT | Mod: PBBFAC,,, | Performed by: INTERNAL MEDICINE

## 2022-04-19 PROCEDURE — 1111F DSCHRG MED/CURRENT MED MERGE: CPT | Mod: CPTII,S$GLB,, | Performed by: INTERNAL MEDICINE

## 2022-04-19 PROCEDURE — 99999 PR PBB SHADOW E&M-EST. PATIENT-LVL III: ICD-10-PCS | Mod: PBBFAC,,, | Performed by: INTERNAL MEDICINE

## 2022-04-19 PROCEDURE — 1157F PR ADVANCE CARE PLAN OR EQUIV PRESENT IN MEDICAL RECORD: ICD-10-PCS | Mod: CPTII,S$GLB,, | Performed by: INTERNAL MEDICINE

## 2022-04-19 PROCEDURE — 1159F MED LIST DOCD IN RCRD: CPT | Mod: CPTII,S$GLB,, | Performed by: INTERNAL MEDICINE

## 2022-04-19 PROCEDURE — 99214 OFFICE O/P EST MOD 30 MIN: CPT | Mod: S$GLB,,, | Performed by: INTERNAL MEDICINE

## 2022-04-19 PROCEDURE — 1111F PR DISCHARGE MEDS RECONCILED W/ CURRENT OUTPATIENT MED LIST: ICD-10-PCS | Mod: CPTII,S$GLB,, | Performed by: INTERNAL MEDICINE

## 2022-04-19 PROCEDURE — 1160F RVW MEDS BY RX/DR IN RCRD: CPT | Mod: CPTII,S$GLB,, | Performed by: INTERNAL MEDICINE

## 2022-04-19 PROCEDURE — 3074F SYST BP LT 130 MM HG: CPT | Mod: CPTII,S$GLB,, | Performed by: INTERNAL MEDICINE

## 2022-04-19 PROCEDURE — 3008F PR BODY MASS INDEX (BMI) DOCUMENTED: ICD-10-PCS | Mod: CPTII,S$GLB,, | Performed by: INTERNAL MEDICINE

## 2022-04-19 NOTE — LETTER
April 19, 2022        Rico Barnhart  [address] [place] [address] [address]  Phone: [phone]  Fax: [phone]             [facility] - Heart Failure & Transplant (Medical [facility])  [address] [place] [address]  Phone: [phone]  Fax: [phone]   Patient: Avery Earl   MR Number: [id number]   YOB: 1955   Date of Visit: 4/19/2022       Dear Dr. Rico Barnhart    Thank you for referring Avery Earl to me for evaluation. Attached you will find relevant portions of my assessment and plan of care.    If you have questions, please do not hesitate to call me. I look forward to following Avery Earl along with you.    Sincerely,    Javi Turk MD    Enclosure    If you would like to receive this communication electronically, please contact [email or web address] or [phone] to request tag[employer]ALLET Link access.    [employer] Link is a tool which provides read-only access to select patient information with whom you have a relationship. Its easy to use and provides real time access to review your patients record including encounter summaries, notes, results, and demographic information.    If you feel you have received this communication in error or would no longer like to receive these types of communications, please e-mail [email or web address]

## 2022-04-26 ENCOUNTER — OFFICE VISIT (OUTPATIENT)
Dept: GASTROENTEROLOGY | Facility: CLINIC | Age: 67
End: 2022-04-26
Payer: COMMERCIAL

## 2022-04-26 ENCOUNTER — TELEPHONE (OUTPATIENT)
Dept: GASTROENTEROLOGY | Facility: CLINIC | Age: 67
End: 2022-04-26

## 2022-04-26 ENCOUNTER — LAB VISIT (OUTPATIENT)
Dept: LAB | Facility: HOSPITAL | Age: 67
End: 2022-04-26
Attending: FAMILY MEDICINE
Payer: COMMERCIAL

## 2022-04-26 ENCOUNTER — TELEPHONE (OUTPATIENT)
Dept: HEMATOLOGY/ONCOLOGY | Facility: CLINIC | Age: 67
End: 2022-04-26
Payer: MEDICARE

## 2022-04-26 VITALS
BODY MASS INDEX: 25.01 KG/M2 | DIASTOLIC BLOOD PRESSURE: 56 MMHG | WEIGHT: 194.88 LBS | HEIGHT: 74 IN | SYSTOLIC BLOOD PRESSURE: 84 MMHG | HEART RATE: 90 BPM

## 2022-04-26 DIAGNOSIS — K83.8 COMMON BILE DUCT DILATION: ICD-10-CM

## 2022-04-26 DIAGNOSIS — K83.1 COMMON BILE DUCT STRICTURE: ICD-10-CM

## 2022-04-26 DIAGNOSIS — K86.89 DILATION OF PANCREATIC DUCT: ICD-10-CM

## 2022-04-26 DIAGNOSIS — K86.89 PANCREATIC MASS: ICD-10-CM

## 2022-04-26 DIAGNOSIS — K86.89 PANCREATIC MASS: Primary | ICD-10-CM

## 2022-04-26 PROCEDURE — 36415 COLL VENOUS BLD VENIPUNCTURE: CPT | Performed by: PHYSICIAN ASSISTANT

## 2022-04-26 PROCEDURE — 99214 OFFICE O/P EST MOD 30 MIN: CPT | Mod: S$GLB,,, | Performed by: PHYSICIAN ASSISTANT

## 2022-04-26 PROCEDURE — 3074F PR MOST RECENT SYSTOLIC BLOOD PRESSURE < 130 MM HG: ICD-10-PCS | Mod: CPTII,S$GLB,, | Performed by: PHYSICIAN ASSISTANT

## 2022-04-26 PROCEDURE — 1126F PR PAIN SEVERITY QUANTIFIED, NO PAIN PRESENT: ICD-10-PCS | Mod: CPTII,S$GLB,, | Performed by: PHYSICIAN ASSISTANT

## 2022-04-26 PROCEDURE — 1159F PR MEDICATION LIST DOCUMENTED IN MEDICAL RECORD: ICD-10-PCS | Mod: CPTII,S$GLB,, | Performed by: PHYSICIAN ASSISTANT

## 2022-04-26 PROCEDURE — 1157F ADVNC CARE PLAN IN RCRD: CPT | Mod: CPTII,S$GLB,, | Performed by: PHYSICIAN ASSISTANT

## 2022-04-26 PROCEDURE — 99999 PR PBB SHADOW E&M-EST. PATIENT-LVL IV: ICD-10-PCS | Mod: PBBFAC,,, | Performed by: PHYSICIAN ASSISTANT

## 2022-04-26 PROCEDURE — 3078F PR MOST RECENT DIASTOLIC BLOOD PRESSURE < 80 MM HG: ICD-10-PCS | Mod: CPTII,S$GLB,, | Performed by: PHYSICIAN ASSISTANT

## 2022-04-26 PROCEDURE — 1126F AMNT PAIN NOTED NONE PRSNT: CPT | Mod: CPTII,S$GLB,, | Performed by: PHYSICIAN ASSISTANT

## 2022-04-26 PROCEDURE — 3288F FALL RISK ASSESSMENT DOCD: CPT | Mod: CPTII,S$GLB,, | Performed by: PHYSICIAN ASSISTANT

## 2022-04-26 PROCEDURE — 3074F SYST BP LT 130 MM HG: CPT | Mod: CPTII,S$GLB,, | Performed by: PHYSICIAN ASSISTANT

## 2022-04-26 PROCEDURE — 3008F PR BODY MASS INDEX (BMI) DOCUMENTED: ICD-10-PCS | Mod: CPTII,S$GLB,, | Performed by: PHYSICIAN ASSISTANT

## 2022-04-26 PROCEDURE — 3288F PR FALLS RISK ASSESSMENT DOCUMENTED: ICD-10-PCS | Mod: CPTII,S$GLB,, | Performed by: PHYSICIAN ASSISTANT

## 2022-04-26 PROCEDURE — 1159F MED LIST DOCD IN RCRD: CPT | Mod: CPTII,S$GLB,, | Performed by: PHYSICIAN ASSISTANT

## 2022-04-26 PROCEDURE — 3008F BODY MASS INDEX DOCD: CPT | Mod: CPTII,S$GLB,, | Performed by: PHYSICIAN ASSISTANT

## 2022-04-26 PROCEDURE — 99999 PR PBB SHADOW E&M-EST. PATIENT-LVL IV: CPT | Mod: PBBFAC,,, | Performed by: PHYSICIAN ASSISTANT

## 2022-04-26 PROCEDURE — 1101F PT FALLS ASSESS-DOCD LE1/YR: CPT | Mod: CPTII,S$GLB,, | Performed by: PHYSICIAN ASSISTANT

## 2022-04-26 PROCEDURE — 80076 HEPATIC FUNCTION PANEL: CPT | Performed by: PHYSICIAN ASSISTANT

## 2022-04-26 PROCEDURE — 1111F PR DISCHARGE MEDS RECONCILED W/ CURRENT OUTPATIENT MED LIST: ICD-10-PCS | Mod: CPTII,S$GLB,, | Performed by: PHYSICIAN ASSISTANT

## 2022-04-26 PROCEDURE — 1111F DSCHRG MED/CURRENT MED MERGE: CPT | Mod: CPTII,S$GLB,, | Performed by: PHYSICIAN ASSISTANT

## 2022-04-26 PROCEDURE — 3078F DIAST BP <80 MM HG: CPT | Mod: CPTII,S$GLB,, | Performed by: PHYSICIAN ASSISTANT

## 2022-04-26 PROCEDURE — 99214 PR OFFICE/OUTPT VISIT, EST, LEVL IV, 30-39 MIN: ICD-10-PCS | Mod: S$GLB,,, | Performed by: PHYSICIAN ASSISTANT

## 2022-04-26 PROCEDURE — 1157F PR ADVANCE CARE PLAN OR EQUIV PRESENT IN MEDICAL RECORD: ICD-10-PCS | Mod: CPTII,S$GLB,, | Performed by: PHYSICIAN ASSISTANT

## 2022-04-26 PROCEDURE — 1101F PR PT FALLS ASSESS DOC 0-1 FALLS W/OUT INJ PAST YR: ICD-10-PCS | Mod: CPTII,S$GLB,, | Performed by: PHYSICIAN ASSISTANT

## 2022-04-26 NOTE — PROGRESS NOTES
Clinic Consult:  [facility] Consultation Note    Reason for Consult:  The primary encounter diagnosis was Pancreatic mass. Diagnoses of Common bile duct dilation, Dilation of pancreatic duct, and Common bile duct stricture were also pertinent to this visit.    PCP: Keven Cuadra   [address] [place] [address]  / [place] [address]    CC: Pancreatitis      HPI:  This is a 66 y.o. male with history of CHF, PAD, hypotension here for evaluation of the above. He is accompanied today by family member. Patient was seen recently in the ED for painless jaundice and dark colored urine. US was completed which showed the following:  3.6 cm solid mass seen at the level the head of the pancreas with significant ductal dilatation of the pancreatic duct measuring up to 11 mm.   Moderate intrahepatic and common bile duct dilatation. 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  Recommend ERCP with endoscopic ultrasound and tissue sampling.  Labs were completed showing alk phos 951, Tbili 18.5, AST 78, ALT 57. ERCP was completed showing CBD stricture with metal stent placement. Brushings returned positive for malignancy. EUS unable to be performed at the time due to complication with scope.  He reports today for follow up. He denies any abdominal pain, nausea, vomiting, itching, diarrhea. Urine and stool are back to normal color. Appetite is decreased. He is eating, but not enjoying it. Also not eating as much due to recent dental work.   He has not yet seen heme onc.    Review of Systems   Constitutional: Positive for appetite change. Negative for activity change, chills, diaphoresis, fatigue and fever.   HENT: Negative for trouble swallowing.    Respiratory: Negative for cough and shortness of breath.    Cardiovascular: Negative for chest pain.   Gastrointestinal: Negative for abdominal distention, abdominal pain, blood in stool, constipation, diarrhea, nausea and  vomiting.   Genitourinary: Negative for dysuria and hematuria.   Musculoskeletal: Negative for back pain.   Neurological: Negative for dizziness and weakness.   Psychiatric/Behavioral: Negative for dysphoric mood. The patient is not nervous/anxious.         Medical History:   Past Medical History:   Diagnosis Date    Arthritis     Cardiomyopathy     Ischemic    CHF (congestive heart failure)     Coronary artery disease     Dyslipidemia     Hyperlipidemia     Hypertension     Mitral insufficiency     Myocardial infarction     Peripheral arterial disease     Pneumonia 2021    Pulmonary hypertension        Surgical History:   Past Surgical History:   Procedure Laterality Date    CORONARY ARTERY BYPASS GRAFT      [place]ima to LAD, saphenous vein graft to right PDA    ENDOSCOPIC ULTRASOUND OF UPPER GASTROINTESTINAL TRACT N/A 2022    Procedure: ULTRASOUND, UPPER GI TRACT, ENDOSCOPIC;  Surgeon: Merritt Darby MD;  Location: [facility] ENDO;  Service: Endoscopy;  Laterality: N/A;    ERCP N/A 2022    Procedure: ERCP (ENDOSCOPIC RETROGRADE CHOLANGIOPANCREATOGRAPHY);  Surgeon: Merritt Darby MD;  Location: [facility];  Service: Endoscopy;  Laterality: N/A;    INSERTION OF IMPLANTABLE CARDIOVERTER-DEFIBRILLATOR (ICD) GENERATOR WITH TWO EXISTING LEADS         Family History:    No family history on file.    Social History:   Social History     Socioeconomic History    Marital status: Unknown   Tobacco Use    Smoking status: Former Smoker     Types: Cigarettes     Quit date:      Years since quittin.3    Smokeless tobacco: Never Used   Substance and Sexual Activity    Alcohol use: Not Currently    Drug use: Never       Allergies:   Review of patient's allergies indicates:  No Known Allergies    Home Medications:   Current Outpatient Medications on File Prior to Visit   Medication Sig Dispense Refill    apixaban (ELIQUIS) 5 mg Tab Take 5 mg by mouth once daily.      aspirin 81  "MG Chew Take 81 mg by mouth once daily.      b complex vitamins capsule Take 1 capsule by mouth once daily.      bumetanide (BUMEX) 1 MG tablet Take 1 mg by mouth once daily.      cholecalciferol, vitamin D3, (VITAMIN D3) 25 mcg (1,000 unit) capsule Take 1,000 Units by mouth once daily.      DOBUTamine (DOBUTREX) 1,000 mg/250 mL (4,000 mcg/mL) infusion Inject 227 mcg/min into the vein continuous.      ENTRESTO 49-51 mg per tablet Take 49-51 tablets by mouth 2 (two) times a day.      omega-3/dha/epa/ala/vitamin D3 (OMEGA-3-DHA-EPA-ALA-VIT D3 ORAL) Take 2 capsules by mouth once daily at 6am.      potassium chloride SA (K-DUR,KLOR-CON) 20 MEQ tablet Take 20 mEq by mouth once daily.      spironolactone (ALDACTONE) 25 MG tablet Take 25 mg by mouth once daily.      amoxicillin-clavulanate 875-125mg (AUGMENTIN) 875-125 mg per tablet Take 1 tablet by mouth every 12 (twelve) hours. (Patient not taking: Reported on 4/26/2022) 10 tablet 0     No current facility-administered medications on file prior to visit.       Physical Exam:  BP (!) 84/56   Pulse 90   Ht 6' 2" (1.88 m)   Wt 88.4 kg (194 lb 14.2 oz)   BMI 25.02 kg/m²   Body mass index is 25.02 kg/m².  Physical Exam  Constitutional:       General: He is not in acute distress.     Appearance: Normal appearance. He is not ill-appearing, toxic-appearing or diaphoretic.   HENT:      Head: Normocephalic and atraumatic.   Eyes:      General: Scleral icterus present.      Extraocular Movements: Extraocular movements intact.   Cardiovascular:      Rate and Rhythm: Normal rate and regular rhythm.      Heart sounds: Murmur heard.   Pulmonary:      Effort: Pulmonary effort is normal. No respiratory distress.   Abdominal:      General: Bowel sounds are normal. There is no distension.      Palpations: Abdomen is soft.      Tenderness: There is no abdominal tenderness. There is no guarding.   Musculoskeletal:         General: Normal range of motion.   Skin:     General: " Skin is warm and dry.   Neurological:      General: No focal deficit present.      Mental Status: He is alert and oriented to person, place, and time.   Psychiatric:         Mood and Affect: Mood normal.         Behavior: Behavior normal.         Thought Content: Thought content normal.           Labs: Pertinent labs reviewed.  Endoscopy: ERCP 4/2022  CRC Screening: --  Anticoagulation: Eliquis    Assessment:  1. Pancreatic mass    2. Common bile duct dilation    3. Dilation of pancreatic duct    4. Common bile duct stricture         Recommendations:  -Repeat hepatic function panel to ensure labs improving after stent placement.   -Discussed with Dr. Miller. Will move forward with referral to hematology oncology. Will reach out via staff message as well.  -Discussion had with patient and family member of likelihood of cancer and heme onc referral is to discuss next steps. They verbalize understanding and agree with plan.  -No plan for repeat endoscopy at this time.    Pancreatic mass  -     Hepatic Function Panel; Future; Expected date: 04/26/2022  -     Ambulatory referral/consult to Hematology / Oncology; Future; Expected date: 05/03/2022    Common bile duct dilation  -     Hepatic Function Panel; Future; Expected date: 04/26/2022    Dilation of pancreatic duct  -     Hepatic Function Panel; Future; Expected date: 04/26/2022  -     Ambulatory referral/consult to Hematology / Oncology; Future; Expected date: 05/03/2022    Common bile duct stricture  -     Hepatic Function Panel; Future; Expected date: 04/26/2022  -     Ambulatory referral/consult to Hematology / Oncology; Future; Expected date: 05/03/2022        No follow-ups on file.    Thank you so much for allowing me to participate in the care of Avery Levy PA-C

## 2022-04-26 NOTE — TELEPHONE ENCOUNTER
Received communication for referral to oncology for poss pancreatic cancer. Ok per dr Shukla to double book pt at same time as video visit tomorrow.   Spoke with pt over the phone today and pt agrees to come 4/27/22 at 1100 am to the cancer center for appt with Dr shukla. He will review appt details in his pt portal .

## 2022-04-26 NOTE — TELEPHONE ENCOUNTER
----- Message from Gracy Levy PA-C sent at 4/26/2022  1:20 PM CDT -----  Please call patient today and let him know that I have discussed with Dr. Miller. No need for additional procedures at this time.   Also, I have reached out to hematology oncology, Dr. Cole, to inform him of the case. Patient should be contacted shortly to schedule appointment.     Thanks,  Gracy    
Patient notified that Ms. Dubose discussed everything with Dr. Darby and no procedures are needed as this time. Also advised that Ms. Dubose has reached out to his heme/onc provider and he should be receiving a call to get scheduled soon.  
(0) No aphasia; normal

## 2022-04-27 ENCOUNTER — LAB VISIT (OUTPATIENT)
Dept: LAB | Facility: HOSPITAL | Age: 67
End: 2022-04-27
Attending: INTERNAL MEDICINE
Payer: MEDICARE

## 2022-04-27 ENCOUNTER — OFFICE VISIT (OUTPATIENT)
Dept: HEMATOLOGY/ONCOLOGY | Facility: CLINIC | Age: 67
End: 2022-04-27
Payer: COMMERCIAL

## 2022-04-27 VITALS
DIASTOLIC BLOOD PRESSURE: 58 MMHG | OXYGEN SATURATION: 100 % | HEART RATE: 86 BPM | HEIGHT: 74 IN | RESPIRATION RATE: 18 BRPM | WEIGHT: 191.38 LBS | SYSTOLIC BLOOD PRESSURE: 84 MMHG | BODY MASS INDEX: 24.56 KG/M2 | TEMPERATURE: 98 F

## 2022-04-27 DIAGNOSIS — K83.1 COMMON BILE DUCT STRICTURE: ICD-10-CM

## 2022-04-27 DIAGNOSIS — K86.89 PANCREATIC MASS: ICD-10-CM

## 2022-04-27 DIAGNOSIS — K86.89 DILATION OF PANCREATIC DUCT: ICD-10-CM

## 2022-04-27 LAB
ALBUMIN SERPL BCP-MCNC: 2.8 G/DL (ref 3.5–5.2)
ALBUMIN SERPL BCP-MCNC: 2.9 G/DL (ref 3.5–5.2)
ALP SERPL-CCNC: 491 U/L (ref 55–135)
ALP SERPL-CCNC: 492 U/L (ref 55–135)
ALT SERPL W/O P-5'-P-CCNC: 36 U/L (ref 10–44)
ALT SERPL W/O P-5'-P-CCNC: 36 U/L (ref 10–44)
ANION GAP SERPL CALC-SCNC: 16 MMOL/L (ref 8–16)
AST SERPL-CCNC: 29 U/L (ref 10–40)
AST SERPL-CCNC: 33 U/L (ref 10–40)
BASOPHILS # BLD AUTO: 0.03 K/UL (ref 0–0.2)
BASOPHILS NFR BLD: 0.6 % (ref 0–1.9)
BILIRUB DIRECT SERPL-MCNC: 3.6 MG/DL (ref 0.1–0.3)
BILIRUB SERPL-MCNC: 4.8 MG/DL (ref 0.1–1)
BILIRUB SERPL-MCNC: 4.9 MG/DL (ref 0.1–1)
BUN SERPL-MCNC: 9 MG/DL (ref 8–23)
CALCIUM SERPL-MCNC: 9.4 MG/DL (ref 8.7–10.5)
CHLORIDE SERPL-SCNC: 102 MMOL/L (ref 95–110)
CO2 SERPL-SCNC: 21 MMOL/L (ref 23–29)
CREAT SERPL-MCNC: 0.7 MG/DL (ref 0.5–1.4)
DIFFERENTIAL METHOD: ABNORMAL
EOSINOPHIL # BLD AUTO: 0.1 K/UL (ref 0–0.5)
EOSINOPHIL NFR BLD: 1.9 % (ref 0–8)
ERYTHROCYTE [DISTWIDTH] IN BLOOD BY AUTOMATED COUNT: 17.7 % (ref 11.5–14.5)
EST. GFR  (AFRICAN AMERICAN): >60 ML/MIN/1.73 M^2
EST. GFR  (NON AFRICAN AMERICAN): >60 ML/MIN/1.73 M^2
GLUCOSE SERPL-MCNC: 110 MG/DL (ref 70–110)
HCT VFR BLD AUTO: 28.8 % (ref 40–54)
HGB BLD-MCNC: 9.6 G/DL (ref 14–18)
IMM GRANULOCYTES # BLD AUTO: 0.01 K/UL (ref 0–0.04)
IMM GRANULOCYTES NFR BLD AUTO: 0.2 % (ref 0–0.5)
LYMPHOCYTES # BLD AUTO: 1.6 K/UL (ref 1–4.8)
LYMPHOCYTES NFR BLD: 35.1 % (ref 18–48)
MCH RBC QN AUTO: 29.2 PG (ref 27–31)
MCHC RBC AUTO-ENTMCNC: 33.3 G/DL (ref 32–36)
MCV RBC AUTO: 88 FL (ref 82–98)
MONOCYTES # BLD AUTO: 0.6 K/UL (ref 0.3–1)
MONOCYTES NFR BLD: 12.3 % (ref 4–15)
NEUTROPHILS # BLD AUTO: 2.3 K/UL (ref 1.8–7.7)
NEUTROPHILS NFR BLD: 49.9 % (ref 38–73)
NRBC BLD-RTO: 0 /100 WBC
PLATELET # BLD AUTO: 230 K/UL (ref 150–450)
PMV BLD AUTO: 9.7 FL (ref 9.2–12.9)
POTASSIUM SERPL-SCNC: 3.6 MMOL/L (ref 3.5–5.1)
PROT SERPL-MCNC: 7.1 G/DL (ref 6–8.4)
PROT SERPL-MCNC: 7.6 G/DL (ref 6–8.4)
RBC # BLD AUTO: 3.29 M/UL (ref 4.6–6.2)
SODIUM SERPL-SCNC: 139 MMOL/L (ref 136–145)
WBC # BLD AUTO: 4.65 K/UL (ref 3.9–12.7)

## 2022-04-27 PROCEDURE — 1126F AMNT PAIN NOTED NONE PRSNT: CPT | Mod: CPTII,S$GLB,, | Performed by: INTERNAL MEDICINE

## 2022-04-27 PROCEDURE — 1159F PR MEDICATION LIST DOCUMENTED IN MEDICAL RECORD: ICD-10-PCS | Mod: CPTII,S$GLB,, | Performed by: INTERNAL MEDICINE

## 2022-04-27 PROCEDURE — 3288F PR FALLS RISK ASSESSMENT DOCUMENTED: ICD-10-PCS | Mod: CPTII,S$GLB,, | Performed by: INTERNAL MEDICINE

## 2022-04-27 PROCEDURE — 3078F PR MOST RECENT DIASTOLIC BLOOD PRESSURE < 80 MM HG: ICD-10-PCS | Mod: CPTII,S$GLB,, | Performed by: INTERNAL MEDICINE

## 2022-04-27 PROCEDURE — 80053 COMPREHEN METABOLIC PANEL: CPT | Performed by: INTERNAL MEDICINE

## 2022-04-27 PROCEDURE — 36415 COLL VENOUS BLD VENIPUNCTURE: CPT | Performed by: INTERNAL MEDICINE

## 2022-04-27 PROCEDURE — 99204 PR OFFICE/OUTPT VISIT, NEW, LEVL IV, 45-59 MIN: ICD-10-PCS | Mod: S$GLB,,, | Performed by: INTERNAL MEDICINE

## 2022-04-27 PROCEDURE — 1159F MED LIST DOCD IN RCRD: CPT | Mod: CPTII,S$GLB,, | Performed by: INTERNAL MEDICINE

## 2022-04-27 PROCEDURE — 3008F PR BODY MASS INDEX (BMI) DOCUMENTED: ICD-10-PCS | Mod: CPTII,S$GLB,, | Performed by: INTERNAL MEDICINE

## 2022-04-27 PROCEDURE — 85025 COMPLETE CBC W/AUTO DIFF WBC: CPT | Performed by: INTERNAL MEDICINE

## 2022-04-27 PROCEDURE — 1101F PR PT FALLS ASSESS DOC 0-1 FALLS W/OUT INJ PAST YR: ICD-10-PCS | Mod: CPTII,S$GLB,, | Performed by: INTERNAL MEDICINE

## 2022-04-27 PROCEDURE — 3078F DIAST BP <80 MM HG: CPT | Mod: CPTII,S$GLB,, | Performed by: INTERNAL MEDICINE

## 2022-04-27 PROCEDURE — 1157F PR ADVANCE CARE PLAN OR EQUIV PRESENT IN MEDICAL RECORD: ICD-10-PCS | Mod: CPTII,S$GLB,, | Performed by: INTERNAL MEDICINE

## 2022-04-27 PROCEDURE — 1101F PT FALLS ASSESS-DOCD LE1/YR: CPT | Mod: CPTII,S$GLB,, | Performed by: INTERNAL MEDICINE

## 2022-04-27 PROCEDURE — 3008F BODY MASS INDEX DOCD: CPT | Mod: CPTII,S$GLB,, | Performed by: INTERNAL MEDICINE

## 2022-04-27 PROCEDURE — 99999 PR PBB SHADOW E&M-EST. PATIENT-LVL V: ICD-10-PCS | Mod: PBBFAC,,, | Performed by: INTERNAL MEDICINE

## 2022-04-27 PROCEDURE — 3288F FALL RISK ASSESSMENT DOCD: CPT | Mod: CPTII,S$GLB,, | Performed by: INTERNAL MEDICINE

## 2022-04-27 PROCEDURE — 1126F PR PAIN SEVERITY QUANTIFIED, NO PAIN PRESENT: ICD-10-PCS | Mod: CPTII,S$GLB,, | Performed by: INTERNAL MEDICINE

## 2022-04-27 PROCEDURE — 3074F PR MOST RECENT SYSTOLIC BLOOD PRESSURE < 130 MM HG: ICD-10-PCS | Mod: CPTII,S$GLB,, | Performed by: INTERNAL MEDICINE

## 2022-04-27 PROCEDURE — 1157F ADVNC CARE PLAN IN RCRD: CPT | Mod: CPTII,S$GLB,, | Performed by: INTERNAL MEDICINE

## 2022-04-27 PROCEDURE — 99999 PR PBB SHADOW E&M-EST. PATIENT-LVL V: CPT | Mod: PBBFAC,,, | Performed by: INTERNAL MEDICINE

## 2022-04-27 PROCEDURE — 3074F SYST BP LT 130 MM HG: CPT | Mod: CPTII,S$GLB,, | Performed by: INTERNAL MEDICINE

## 2022-04-27 PROCEDURE — 99204 OFFICE O/P NEW MOD 45 MIN: CPT | Mod: S$GLB,,, | Performed by: INTERNAL MEDICINE

## 2022-04-27 RX ORDER — CYPROHEPTADINE HYDROCHLORIDE 4 MG/1
4 TABLET ORAL
COMMUNITY
Start: 2022-04-14 | End: 2022-09-05

## 2022-04-27 NOTE — ASSESSMENT & PLAN NOTE
Probable pancreatic cancer.  Noted positive malignant cells from common bile duct brushing.  Noted normal serum tumor marker CA 19 9. Reviewed ultrasound results with family and patient and answered the questions.  Will initiate imaging studies with CT of chest abdomen and pelvis with contrast.    Based on this CT scan, will recommend re-attempt on endoscopic ultrasound with biopsy for tissue diagnosis and molecular testing.    Will order germline testing.  Return in 1-2 weeks with repeat labs or sooner if needed.

## 2022-04-27 NOTE — PROGRESS NOTES
Subjective:   Date of Visit: 4/27/22   ?   ?    REFERRING PROVIDER: Gracy Levy PA-C  [id number] [facility] [place] [id number]   ?   CHIEF COMPLAINT:  Pancreatic cancer???????   ?   ONCOLOGIC DIAGNOSIS:  Pancreatic adenocarcinoma  ?   CURRENT TREATMENT:  None    PAST TREATMENT:  None  ?   ONCOLOGIC HISTORY:     Ultrasound abdomen 04/05/2022:    Impression:     3.6 cm solid mass seen at the level the head of the pancreas with significant ductal dilatation of the pancreatic duct measuring up to 11 mm.   Moderate intrahepatic and common bile duct dilatation. 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  Recommend ERCP with endoscopic ultrasound and tissue sampling.     1. Bile duct stricture, biopsy:   - Minute fragment of benign bile duct epithelium,  negative  for dysplasia or   malignancy   2. Common bile duct brushing (1 thin prep):   -  Positive  for malignancy (see comment)        HPI:  66-year-old male with history of CHF, PAD was referred to us due to recent finding of positive malignant cells in the common bile duct brushing.  Apparently he was recently seen in the emergency room due to painless jaundice.  Ultrasound obtained at that time revealed a 3.6 cm solid mass at the head of pancreas.    Common bile duct brushings returned positive for malignancy. EUS unable to be performed at the time due to complication with scope per GI.  Given positive malignant cells, he was advised to follow with Oncology for further management    Today he continues to deny any abdominal pain, nausea, vomiting, itching, diarrhea. Urine and stool are back to normal color per patient.  Appetite is somewhat decrease with decrease in weight. Had recent dental work and waiting for dentures. This has affected his ability to eat.       No pertinent family history.    Review of Systems   Constitutional: Positive for fatigue. Negative for activity change, appetite change,  chills, fever and unexpected weight change.   HENT: Negative for hearing loss, mouth sores, nosebleeds, sore throat, tinnitus, trouble swallowing and voice change.    Eyes: Negative for visual disturbance.   Respiratory: Negative for cough, chest tightness, shortness of breath and wheezing.    Cardiovascular: Negative for chest pain, palpitations and leg swelling.   Gastrointestinal: Negative for abdominal pain, anal bleeding, blood in stool, constipation, diarrhea and vomiting.   Genitourinary: Negative for frequency, hematuria and testicular pain.   Musculoskeletal: Negative for arthralgias, back pain, gait problem and neck pain.   Skin: Negative for color change, pallor, rash and wound.   Allergic/Immunologic: Negative for immunocompromised state.   Neurological: Positive for weakness. Negative for seizures, syncope, numbness and headaches.   Hematological: Negative for adenopathy. Does not bruise/bleed easily.   Psychiatric/Behavioral: Negative for agitation, confusion, decreased concentration, hallucinations and sleep disturbance. The patient is not nervous/anxious.        ?   PAST MEDICAL HISTORY:   Past Medical History:   Diagnosis Date    Arthritis     Cardiomyopathy     Ischemic    CHF (congestive heart failure)     Coronary artery disease     Dyslipidemia     Hyperlipidemia     Hypertension     Mitral insufficiency     Myocardial infarction     Peripheral arterial disease     Pneumonia 12/2021    Pulmonary hypertension     ?     PAST SURGICAL HISTORY:   Past Surgical History:   Procedure Laterality Date    CORONARY ARTERY BYPASS GRAFT      [place] to LAD, saphenous vein graft to right PDA    ENDOSCOPIC ULTRASOUND OF UPPER GASTROINTESTINAL TRACT N/A 4/8/2022    Procedure: ULTRASOUND, UPPER GI TRACT, ENDOSCOPIC;  Surgeon: Merritt Darby MD;  Location: [facility];  Service: Endoscopy;  Laterality: N/A;    ERCP N/A 4/8/2022    Procedure: ERCP (ENDOSCOPIC RETROGRADE  CHOLANGIOPANCREATOGRAPHY);  Surgeon: Merritt Darby MD;  Location: [facility];  Service: Endoscopy;  Laterality: N/A;    INSERTION OF IMPLANTABLE CARDIOVERTER-DEFIBRILLATOR (ICD) GENERATOR WITH TWO EXISTING LEADS        ?   ALLERGIES:   Allergies as of 2022    (No Known Allergies)      ?   MEDICATIONS:?   Outpatient Medications Marked as Taking for the 22 encounter (Office Visit) with Hardeep Cole MD   Medication Sig Dispense Refill    amoxicillin-clavulanate 875-125mg (AUGMENTIN) 875-125 mg per tablet Take 1 tablet by mouth every 12 (twelve) hours. 10 tablet 0    apixaban (ELIQUIS) 5 mg Tab Take 5 mg by mouth once daily.      aspirin 81 MG Chew Take 81 mg by mouth once daily.      b complex vitamins capsule Take 1 capsule by mouth once daily.      bumetanide (BUMEX) 1 MG tablet Take 1 mg by mouth once daily.      cholecalciferol, vitamin D3, (VITAMIN D3) 25 mcg (1,000 unit) capsule Take 1,000 Units by mouth once daily.      DOBUTamine (DOBUTREX) 1,000 mg/250 mL (4,000 mcg/mL) infusion Inject 227 mcg/min into the vein continuous.      ENTRESTO 49-51 mg per tablet Take 49-51 tablets by mouth 2 (two) times a day.      omega-3/dha/epa/ala/vitamin D3 (OMEGA-3-DHA-EPA-ALA-VIT D3 ORAL) Take 2 capsules by mouth once daily at 6am.      potassium chloride SA (K-DUR,KLOR-CON) 20 MEQ tablet Take 20 mEq by mouth once daily.      spironolactone (ALDACTONE) 25 MG tablet Take 25 mg by mouth once daily.        ?   SOCIAL HISTORY:?   Social History     Tobacco Use    Smoking status: Former Smoker     Types: Cigarettes     Quit date:      Years since quittin.3    Smokeless tobacco: Never Used   Substance Use Topics    Alcohol use: Not Currently        ?   FAMILY HISTORY:   family history is not on file.   ?     Objective:      Physical Exam  Constitutional:       General: He is not in acute distress.     Appearance: He is well-developed.   HENT:      Head: Normocephalic and  atraumatic.      Right Ear: External ear normal.      Left Ear: External ear normal.      Mouth/Throat:      Pharynx: No oropharyngeal exudate.   Eyes:      General: No scleral icterus.        Right eye: No discharge.         Left eye: No discharge.      Conjunctiva/sclera: Conjunctivae normal.      Pupils: Pupils are equal, round, and reactive to light.   Neck:      Thyroid: No thyromegaly.   Cardiovascular:      Rate and Rhythm: Normal rate and regular rhythm.      Heart sounds: Normal heart sounds. No murmur heard.  Pulmonary:      Effort: Pulmonary effort is normal. No respiratory distress.      Breath sounds: Normal breath sounds. No wheezing.   Chest:      Chest wall: No tenderness.   Breasts:      Right: No supraclavicular adenopathy.      Left: No supraclavicular adenopathy.       Abdominal:      General: Bowel sounds are normal. There is no distension.      Palpations: Abdomen is soft. There is no mass.      Tenderness: There is no abdominal tenderness. There is no rebound.   Musculoskeletal:         General: No tenderness. Normal range of motion.      Cervical back: Normal range of motion and neck supple.   Lymphadenopathy:      Cervical: No cervical adenopathy.      Right cervical: No superficial cervical adenopathy.     Left cervical: No superficial cervical adenopathy.      Upper Body:      Right upper body: No supraclavicular or pectoral adenopathy.      Left upper body: No supraclavicular or pectoral adenopathy.   Skin:     General: Skin is warm and dry.      Capillary Refill: Capillary refill takes 2 to 3 seconds.      Coloration: Skin is not pale.      Findings: No erythema or rash.   Neurological:      Mental Status: He is alert and oriented to person, place, and time.      Cranial Nerves: No cranial nerve deficit.      Sensory: No sensory deficit.   Psychiatric:         Behavior: Behavior normal.         Judgment: Judgment normal.         ?   Vitals:    04/27/22 1057   BP: (!) 84/58   Pulse: 86    Resp: 18   Temp: 98 °F (36.7 °C)      ?     ECOG SCORE    1 - Restricted in strenuous activity-ambulatory and able to carry out work of a light nature       ?   Laboratory:  ?   No visits with results within 1 Day(s) from this visit.   Latest known visit with results is:   Lab Visit on 04/26/2022   Component Date Value Ref Range Status    Total Protein 04/26/2022 7.1  6.0 - 8.4 g/dL Final    Albumin 04/26/2022 2.8 (A) 3.5 - 5.2 g/dL Final    Total Bilirubin 04/26/2022 4.9 (A) 0.1 - 1.0 mg/dL Final    Bilirubin, Direct 04/26/2022 3.6 (A) 0.1 - 0.3 mg/dL Final    AST 04/26/2022 29  10 - 40 U/L Final    ALT 04/26/2022 36  10 - 44 U/L Final    Alkaline Phosphatase 04/26/2022 492 (A) 55 - 135 U/L Final      ?   Tumor markers   ?   ?   Imaging: FL ERCP Biliary Only by Rad Tech  Narrative: EXAMINATION:  FL ERCP BILIARY ONLY    CLINICAL HISTORY:  stent;    COMPARISON:  None  Impression: FINDINGS/  Intraoperative fluoroscopic images were obtained.    Total fluoro time was 1.2 sec and 8 fluoroscopic images were obtained.  See operative report.    Electronically signed by: Lacho Hair MD  Date:    04/08/2022  Time:    10:49     ?      Pathology:  Pathology Results  (Last 10 years)    None           ?   Assessment/Plan:       1. Pancreatic mass    2. Dilation of pancreatic duct    3. Common bile duct stricture          Pancreatic mass  Probable pancreatic cancer.  Noted positive malignant cells from common bile duct brushing.  Noted normal serum tumor marker CA 19 9. Reviewed ultrasound results with family and patient and answered the questions.  Will initiate imaging studies with CT of chest abdomen and pelvis with contrast.    Based on this CT scan, will recommend re-attempt on endoscopic ultrasound with biopsy for tissue diagnosis and molecular testing.    Will order germline testing.  Return in 1-2 weeks with repeat labs or sooner if needed.      ?Pancreatic mass  -     CBC Auto Differential; Future; Expected  date: 04/27/2022  -     CBC Auto Differential; Future; Expected date: 04/27/2022  -     Comprehensive Metabolic Panel; Future; Expected date: 04/27/2022  -     Comprehensive Metabolic Panel; Future; Expected date: 04/27/2022  -     CT Chest Abdomen Pelvis With Contrast (xpd); Future; Expected date: 04/27/2022  -     Ambulatory referral/consult to Hematology / Oncology    Dilation of pancreatic duct  -     CBC Auto Differential; Future; Expected date: 04/27/2022  -     CBC Auto Differential; Future; Expected date: 04/27/2022  -     Comprehensive Metabolic Panel; Future; Expected date: 04/27/2022  -     Comprehensive Metabolic Panel; Future; Expected date: 04/27/2022  -     CT Chest Abdomen Pelvis With Contrast (xpd); Future; Expected date: 04/27/2022  -     Ambulatory referral/consult to Hematology / Oncology    Common bile duct stricture  -     CBC Auto Differential; Future; Expected date: 04/27/2022  -     CBC Auto Differential; Future; Expected date: 04/27/2022  -     Comprehensive Metabolic Panel; Future; Expected date: 04/27/2022  -     Comprehensive Metabolic Panel; Future; Expected date: 04/27/2022  -     CT Chest Abdomen Pelvis With Contrast (xpd); Future; Expected date: 04/27/2022  -     Ambulatory referral/consult to Hematology / Oncology    ?   Follow-Up: Follow up in about 2 weeks (around 5/11/2022).    JOSH FOUNTAIN Md., Ph.D  Hematology & Oncology Department  Phone #: [phone]

## 2022-05-03 ENCOUNTER — EXTERNAL HOME HEALTH (OUTPATIENT)
Dept: HOME HEALTH SERVICES | Facility: HOSPITAL | Age: 67
End: 2022-05-03
Payer: MEDICARE

## 2022-05-05 ENCOUNTER — TELEPHONE (OUTPATIENT)
Dept: GASTROENTEROLOGY | Facility: CLINIC | Age: 67
End: 2022-05-05
Payer: MEDICARE

## 2022-05-05 NOTE — TELEPHONE ENCOUNTER
----- Message from Merritt Darby MD sent at 4/29/2022  5:22 PM CDT -----  Ok,     Leann please schedule him for EUS with me for biopsies. Will need cardiac risk assessment before the procedure.    -[facility]  ----- Message -----  From: Hardeep Cole MD  Sent: 4/27/2022   6:36 AM CDT  To: Merritt Darby MD    I will obtain CT scan and germline testing. I think he needs a re-attempt at EUS with biopsy for molecular study

## 2022-05-05 NOTE — TELEPHONE ENCOUNTER
----- Message from Merritt Darby MD sent at 4/29/2022  5:22 PM CDT -----  Ok,     Leann please schedule him for EUS with me for biopsies. Will need cardiac risk assessment before the procedure.    -[facility]  ----- Message -----  From: Hardeep Cole MD  Sent: 4/27/2022   6:36 AM CDT  To: Merritt Darby MD    I will obtain CT scan and germline testing. I think he needs a re-attempt at EUS with biopsy for molecular study

## 2022-05-06 ENCOUNTER — HOSPITAL ENCOUNTER (OUTPATIENT)
Dept: RADIOLOGY | Facility: HOSPITAL | Age: 67
Discharge: HOME | End: 2022-05-06
Attending: INTERNAL MEDICINE
Payer: MEDICARE

## 2022-05-06 DIAGNOSIS — K86.89 PANCREATIC MASS: ICD-10-CM

## 2022-05-06 DIAGNOSIS — K83.1 COMMON BILE DUCT STRICTURE: ICD-10-CM

## 2022-05-06 DIAGNOSIS — K86.89 DILATION OF PANCREATIC DUCT: ICD-10-CM

## 2022-05-06 PROCEDURE — 71260 CT THORAX DX C+: CPT | Mod: TC

## 2022-05-06 PROCEDURE — 74177 CT ABD & PELVIS W/CONTRAST: CPT | Mod: TC

## 2022-05-06 PROCEDURE — 25500020 PHARM REV CODE 255: Performed by: INTERNAL MEDICINE

## 2022-05-06 RX ADMIN — IOHEXOL 100 ML: 350 INJECTION, SOLUTION INTRAVENOUS at 03:05

## 2022-05-09 ENCOUNTER — LAB VISIT (OUTPATIENT)
Dept: LAB | Facility: HOSPITAL | Age: 67
End: 2022-05-09
Attending: INTERNAL MEDICINE
Payer: MEDICARE

## 2022-05-09 ENCOUNTER — OFFICE VISIT (OUTPATIENT)
Dept: HEMATOLOGY/ONCOLOGY | Facility: CLINIC | Age: 67
End: 2022-05-09
Payer: MEDICARE

## 2022-05-09 VITALS
WEIGHT: 192.44 LBS | HEIGHT: 74 IN | OXYGEN SATURATION: 100 % | HEART RATE: 95 BPM | DIASTOLIC BLOOD PRESSURE: 56 MMHG | TEMPERATURE: 98 F | SYSTOLIC BLOOD PRESSURE: 92 MMHG | BODY MASS INDEX: 24.7 KG/M2

## 2022-05-09 DIAGNOSIS — K86.89 PANCREATIC MASS: ICD-10-CM

## 2022-05-09 DIAGNOSIS — K86.89 DILATION OF PANCREATIC DUCT: ICD-10-CM

## 2022-05-09 DIAGNOSIS — K83.1 COMMON BILE DUCT STRICTURE: ICD-10-CM

## 2022-05-09 LAB
ALBUMIN SERPL BCP-MCNC: 2.7 G/DL (ref 3.5–5.2)
ALP SERPL-CCNC: 286 U/L (ref 55–135)
ALT SERPL W/O P-5'-P-CCNC: 9 U/L (ref 10–44)
ANION GAP SERPL CALC-SCNC: 15 MMOL/L (ref 8–16)
AST SERPL-CCNC: 14 U/L (ref 10–40)
BASOPHILS # BLD AUTO: 0.03 K/UL (ref 0–0.2)
BASOPHILS NFR BLD: 0.5 % (ref 0–1.9)
BILIRUB SERPL-MCNC: 3.4 MG/DL (ref 0.1–1)
BUN SERPL-MCNC: 11 MG/DL (ref 8–23)
CALCIUM SERPL-MCNC: 9.2 MG/DL (ref 8.7–10.5)
CHLORIDE SERPL-SCNC: 101 MMOL/L (ref 95–110)
CO2 SERPL-SCNC: 22 MMOL/L (ref 23–29)
CREAT SERPL-MCNC: 0.8 MG/DL (ref 0.5–1.4)
DIFFERENTIAL METHOD: ABNORMAL
EOSINOPHIL # BLD AUTO: 0.1 K/UL (ref 0–0.5)
EOSINOPHIL NFR BLD: 1.3 % (ref 0–8)
ERYTHROCYTE [DISTWIDTH] IN BLOOD BY AUTOMATED COUNT: 17.6 % (ref 11.5–14.5)
EST. GFR  (AFRICAN AMERICAN): >60 ML/MIN/1.73 M^2
EST. GFR  (NON AFRICAN AMERICAN): >60 ML/MIN/1.73 M^2
GLUCOSE SERPL-MCNC: 124 MG/DL (ref 70–110)
HCT VFR BLD AUTO: 26.2 % (ref 40–54)
HGB BLD-MCNC: 8.6 G/DL (ref 14–18)
IMM GRANULOCYTES # BLD AUTO: 0.01 K/UL (ref 0–0.04)
IMM GRANULOCYTES NFR BLD AUTO: 0.2 % (ref 0–0.5)
LYMPHOCYTES # BLD AUTO: 1.6 K/UL (ref 1–4.8)
LYMPHOCYTES NFR BLD: 26.5 % (ref 18–48)
MCH RBC QN AUTO: 29.5 PG (ref 27–31)
MCHC RBC AUTO-ENTMCNC: 32.8 G/DL (ref 32–36)
MCV RBC AUTO: 90 FL (ref 82–98)
MONOCYTES # BLD AUTO: 0.7 K/UL (ref 0.3–1)
MONOCYTES NFR BLD: 12 % (ref 4–15)
NEUTROPHILS # BLD AUTO: 3.6 K/UL (ref 1.8–7.7)
NEUTROPHILS NFR BLD: 59.5 % (ref 38–73)
NRBC BLD-RTO: 0 /100 WBC
PLATELET # BLD AUTO: 212 K/UL (ref 150–450)
PMV BLD AUTO: 8.8 FL (ref 9.2–12.9)
POTASSIUM SERPL-SCNC: 3.3 MMOL/L (ref 3.5–5.1)
PROT SERPL-MCNC: 7.1 G/DL (ref 6–8.4)
RBC # BLD AUTO: 2.92 M/UL (ref 4.6–6.2)
SODIUM SERPL-SCNC: 138 MMOL/L (ref 136–145)
WBC # BLD AUTO: 6.01 K/UL (ref 3.9–12.7)

## 2022-05-09 PROCEDURE — 99214 PR OFFICE/OUTPT VISIT, EST, LEVL IV, 30-39 MIN: ICD-10-PCS | Mod: S$GLB,,, | Performed by: INTERNAL MEDICINE

## 2022-05-09 PROCEDURE — 3288F FALL RISK ASSESSMENT DOCD: CPT | Mod: CPTII,S$GLB,, | Performed by: INTERNAL MEDICINE

## 2022-05-09 PROCEDURE — 80053 COMPREHEN METABOLIC PANEL: CPT | Performed by: INTERNAL MEDICINE

## 2022-05-09 PROCEDURE — 1159F PR MEDICATION LIST DOCUMENTED IN MEDICAL RECORD: ICD-10-PCS | Mod: CPTII,S$GLB,, | Performed by: INTERNAL MEDICINE

## 2022-05-09 PROCEDURE — 36415 COLL VENOUS BLD VENIPUNCTURE: CPT | Performed by: INTERNAL MEDICINE

## 2022-05-09 PROCEDURE — 85025 COMPLETE CBC W/AUTO DIFF WBC: CPT | Performed by: INTERNAL MEDICINE

## 2022-05-09 PROCEDURE — 1111F PR DISCHARGE MEDS RECONCILED W/ CURRENT OUTPATIENT MED LIST: ICD-10-PCS | Mod: CPTII,S$GLB,, | Performed by: INTERNAL MEDICINE

## 2022-05-09 PROCEDURE — 1157F PR ADVANCE CARE PLAN OR EQUIV PRESENT IN MEDICAL RECORD: ICD-10-PCS | Mod: CPTII,S$GLB,, | Performed by: INTERNAL MEDICINE

## 2022-05-09 PROCEDURE — 1101F PT FALLS ASSESS-DOCD LE1/YR: CPT | Mod: CPTII,S$GLB,, | Performed by: INTERNAL MEDICINE

## 2022-05-09 PROCEDURE — 1159F MED LIST DOCD IN RCRD: CPT | Mod: CPTII,S$GLB,, | Performed by: INTERNAL MEDICINE

## 2022-05-09 PROCEDURE — 3078F DIAST BP <80 MM HG: CPT | Mod: CPTII,S$GLB,, | Performed by: INTERNAL MEDICINE

## 2022-05-09 PROCEDURE — 99999 PR PBB SHADOW E&M-EST. PATIENT-LVL III: ICD-10-PCS | Mod: PBBFAC,,, | Performed by: INTERNAL MEDICINE

## 2022-05-09 PROCEDURE — 1111F DSCHRG MED/CURRENT MED MERGE: CPT | Mod: CPTII,S$GLB,, | Performed by: INTERNAL MEDICINE

## 2022-05-09 PROCEDURE — 1157F ADVNC CARE PLAN IN RCRD: CPT | Mod: CPTII,S$GLB,, | Performed by: INTERNAL MEDICINE

## 2022-05-09 PROCEDURE — 3008F PR BODY MASS INDEX (BMI) DOCUMENTED: ICD-10-PCS | Mod: CPTII,S$GLB,, | Performed by: INTERNAL MEDICINE

## 2022-05-09 PROCEDURE — 3074F PR MOST RECENT SYSTOLIC BLOOD PRESSURE < 130 MM HG: ICD-10-PCS | Mod: CPTII,S$GLB,, | Performed by: INTERNAL MEDICINE

## 2022-05-09 PROCEDURE — 1126F AMNT PAIN NOTED NONE PRSNT: CPT | Mod: CPTII,S$GLB,, | Performed by: INTERNAL MEDICINE

## 2022-05-09 PROCEDURE — 1126F PR PAIN SEVERITY QUANTIFIED, NO PAIN PRESENT: ICD-10-PCS | Mod: CPTII,S$GLB,, | Performed by: INTERNAL MEDICINE

## 2022-05-09 PROCEDURE — 99999 PR PBB SHADOW E&M-EST. PATIENT-LVL III: CPT | Mod: PBBFAC,,, | Performed by: INTERNAL MEDICINE

## 2022-05-09 PROCEDURE — 3288F PR FALLS RISK ASSESSMENT DOCUMENTED: ICD-10-PCS | Mod: CPTII,S$GLB,, | Performed by: INTERNAL MEDICINE

## 2022-05-09 PROCEDURE — 1101F PR PT FALLS ASSESS DOC 0-1 FALLS W/OUT INJ PAST YR: ICD-10-PCS | Mod: CPTII,S$GLB,, | Performed by: INTERNAL MEDICINE

## 2022-05-09 PROCEDURE — 3078F PR MOST RECENT DIASTOLIC BLOOD PRESSURE < 80 MM HG: ICD-10-PCS | Mod: CPTII,S$GLB,, | Performed by: INTERNAL MEDICINE

## 2022-05-09 PROCEDURE — 3074F SYST BP LT 130 MM HG: CPT | Mod: CPTII,S$GLB,, | Performed by: INTERNAL MEDICINE

## 2022-05-09 PROCEDURE — 3008F BODY MASS INDEX DOCD: CPT | Mod: CPTII,S$GLB,, | Performed by: INTERNAL MEDICINE

## 2022-05-09 PROCEDURE — 99214 OFFICE O/P EST MOD 30 MIN: CPT | Mod: S$GLB,,, | Performed by: INTERNAL MEDICINE

## 2022-05-09 NOTE — ASSESSMENT & PLAN NOTE
Probable pancreatic cancer.  Noted positive malignant cells from common bile duct brushing.  Noted normal serum tumor marker CA 19 9. Reviewed ultrasound results with family and patient and answered their questions.    Staging CT scan from 05/06/2022 showed a 5.3 cm hypoenhancing lesion in the inferior right liver suspicious for metastatic disease, additional 1.6 cm lesion at the inferior right liver.  Also reported and ill-defined hypoenhancing 2.8 cm pancreatic head mass and 2.5 x 1.4 cm aortocaval retroperitoneal lymph node.    Advised patient that based on the above radiographic study, it appears he has and unresectable pancreatic cancer.  Will proceed with biopsy of the liver lesion for molecular studies.  Have ordered germline testing, awaiting results.    He understands that his disease is incurable but can be treated.  Unfortunately with ejection fraction of 15%, his eligibility and tolerating to chemotherapy is highly questionable.    I recommended hospice/comfort care however patient insisted on presenting his case to the tumor conference.  He is to return in 1-2 weeks with repeat labs or sooner if needed.

## 2022-05-09 NOTE — PROGRESS NOTES
Subjective:   Date of Visit: 5/9/22   ?   ?    REFERRING PROVIDER: No referring provider defined for this encounter.   ?   CHIEF COMPLAINT:  Pancreatic cancer???????   ?   ONCOLOGIC DIAGNOSIS:  Pancreatic adenocarcinoma  ?   CURRENT TREATMENT:  None    PAST TREATMENT:  None  ?   ONCOLOGIC HISTORY:     Ultrasound abdomen 04/05/2022:    Impression:     3.6 cm solid mass seen at the level the head of the pancreas with significant ductal dilatation of the pancreatic duct measuring up to 11 mm.   Moderate intrahepatic and common bile duct dilatation. 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  Recommend ERCP with endoscopic ultrasound and tissue sampling.     1. Bile duct stricture, biopsy:   - Minute fragment of benign bile duct epithelium,  negative  for dysplasia or   malignancy   2. Common bile duct brushing (1 thin prep):   -  Positive  for malignancy (see comment)        HPI:  66-year-old male with history of CHF, PAD was referred to us due to recent finding of positive malignant cells in the common bile duct brushing.  Apparently he was recently seen in the emergency room due to painless jaundice.  Ultrasound obtained at that time revealed a 3.6 cm solid mass at the head of pancreas.    Common bile duct brushings returned positive for malignancy. EUS unable to be performed at the time due to complication with scope per GI.  Given positive malignant cells, he was advised to follow with Oncology for further management.    He was advised to obtain staging CT scan. He presents today to discuss result. No new complaints      Review of Systems   Constitutional: Positive for fatigue. Negative for activity change, appetite change, chills, fever and unexpected weight change.   HENT: Negative for hearing loss, mouth sores, nosebleeds, sore throat, tinnitus, trouble swallowing and voice change.    Eyes: Negative for visual disturbance.   Respiratory: Negative for cough,  chest tightness, shortness of breath and wheezing.    Cardiovascular: Negative for chest pain, palpitations and leg swelling.   Gastrointestinal: Negative for abdominal pain, anal bleeding, blood in stool, constipation, diarrhea and vomiting.   Genitourinary: Negative for frequency, hematuria and testicular pain.   Musculoskeletal: Negative for arthralgias, back pain, gait problem and neck pain.   Skin: Negative for color change, pallor, rash and wound.   Allergic/Immunologic: Negative for immunocompromised state.   Neurological: Positive for weakness. Negative for seizures, syncope, numbness and headaches.   Hematological: Negative for adenopathy. Does not bruise/bleed easily.   Psychiatric/Behavioral: Negative for agitation, confusion, decreased concentration, hallucinations and sleep disturbance. The patient is not nervous/anxious.        ?   PAST MEDICAL HISTORY:   Past Medical History:   Diagnosis Date    Arthritis     Cardiomyopathy     Ischemic    CHF (congestive heart failure)     Coronary artery disease     Dyslipidemia     Hyperlipidemia     Hypertension     Mitral insufficiency     Myocardial infarction     Peripheral arterial disease     Pneumonia 12/2021    Pulmonary hypertension     ?     PAST SURGICAL HISTORY:   Past Surgical History:   Procedure Laterality Date    CORONARY ARTERY BYPASS GRAFT      Lima to LAD, saphenous vein graft to right PDA    ENDOSCOPIC ULTRASOUND OF UPPER GASTROINTESTINAL TRACT N/A 4/8/2022    Procedure: ULTRASOUND, UPPER GI TRACT, ENDOSCOPIC;  Surgeon: Merritt Darby MD;  Location: [facility];  Service: Endoscopy;  Laterality: N/A;    ERCP N/A 4/8/2022    Procedure: ERCP (ENDOSCOPIC RETROGRADE CHOLANGIOPANCREATOGRAPHY);  Surgeon: Merritt Darby MD;  Location: [facility];  Service: Endoscopy;  Laterality: N/A;    INSERTION OF IMPLANTABLE CARDIOVERTER-DEFIBRILLATOR (ICD) GENERATOR WITH TWO EXISTING LEADS        ?   ALLERGIES:   Allergies as of  2022    (No Known Allergies)      ?   MEDICATIONS:?   Outpatient Medications Marked as Taking for the 22 encounter (Office Visit) with Hardeep Cole MD   Medication Sig Dispense Refill    apixaban (ELIQUIS) 5 mg Tab Take 5 mg by mouth once daily.      aspirin 81 MG Chew Take 81 mg by mouth once daily.      b complex vitamins capsule Take 1 capsule by mouth once daily.      bumetanide (BUMEX) 1 MG tablet Take 1 mg by mouth once daily.      cholecalciferol, vitamin D3, (VITAMIN D3) 25 mcg (1,000 unit) capsule Take 1,000 Units by mouth once daily.      cyproheptadine (PERIACTIN) 4 mg tablet Take 4 mg by mouth 3 (three) times daily with meals.      DOBUTamine (DOBUTREX) 1,000 mg/250 mL (4,000 mcg/mL) infusion Inject 227 mcg/min into the vein continuous.      ENTRESTO 49-51 mg per tablet Take 49-51 tablets by mouth 2 (two) times a day.      omega-3/dha/epa/ala/vitamin D3 (OMEGA-3-DHA-EPA-ALA-VIT D3 ORAL) Take 2 capsules by mouth once daily at 6am.      potassium chloride SA (K-DUR,KLOR-CON) 20 MEQ tablet Take 20 mEq by mouth once daily.      spironolactone (ALDACTONE) 25 MG tablet Take 25 mg by mouth once daily.        ?   SOCIAL HISTORY:?   Social History     Tobacco Use    Smoking status: Former Smoker     Types: Cigarettes     Quit date:      Years since quittin.3    Smokeless tobacco: Never Used   Substance Use Topics    Alcohol use: Not Currently        ?   FAMILY HISTORY:   family history is not on file.   ?     Objective:      Physical Exam  Constitutional:       General: He is not in acute distress.     Appearance: He is well-developed.   HENT:      Head: Normocephalic and atraumatic.      Right Ear: External ear normal.      Left Ear: External ear normal.      Mouth/Throat:      Pharynx: No oropharyngeal exudate.   Eyes:      General: No scleral icterus.        Right eye: No discharge.         Left eye: No discharge.      Conjunctiva/sclera: Conjunctivae normal.       Pupils: Pupils are equal, round, and reactive to light.   Neck:      Thyroid: No thyromegaly.   Cardiovascular:      Rate and Rhythm: Normal rate and regular rhythm.      Heart sounds: Normal heart sounds. No murmur heard.  Pulmonary:      Effort: Pulmonary effort is normal. No respiratory distress.      Breath sounds: Normal breath sounds. No wheezing.   Chest:      Chest wall: No tenderness.   Breasts:      Right: No supraclavicular adenopathy.      Left: No supraclavicular adenopathy.       Abdominal:      General: Bowel sounds are normal. There is no distension.      Palpations: Abdomen is soft. There is no mass.      Tenderness: There is no abdominal tenderness. There is no rebound.   Musculoskeletal:         General: No tenderness. Normal range of motion.      Cervical back: Normal range of motion and neck supple.   Lymphadenopathy:      Cervical: No cervical adenopathy.      Right cervical: No superficial cervical adenopathy.     Left cervical: No superficial cervical adenopathy.      Upper Body:      Right upper body: No supraclavicular or pectoral adenopathy.      Left upper body: No supraclavicular or pectoral adenopathy.   Skin:     General: Skin is warm and dry.      Capillary Refill: Capillary refill takes 2 to 3 seconds.      Coloration: Skin is not pale.      Findings: No erythema or rash.   Neurological:      Mental Status: He is alert and oriented to person, place, and time.      Cranial Nerves: No cranial nerve deficit.      Sensory: No sensory deficit.   Psychiatric:         Behavior: Behavior normal.         Judgment: Judgment normal.         ?   Vitals:    05/09/22 1305   BP: (!) 92/56   Pulse: 95   Temp: 98 °F (36.7 °C)      ?     ECOG SCORE    2 - Capable of all selfcare but unable to carry out any work activities, active > 50% of hours       ?   Laboratory:  ?   Lab Visit on 05/09/2022   Component Date Value Ref Range Status    WBC 05/09/2022 6.01  3.90 - 12.70 K/uL Final    RBC 05/09/2022  2.92 (A) 4.60 - 6.20 M/uL Final    Hemoglobin 05/09/2022 8.6 (A) 14.0 - 18.0 g/dL Final    Hematocrit 05/09/2022 26.2 (A) 40.0 - 54.0 % Final    MCV 05/09/2022 90  82 - 98 fL Final    MCH 05/09/2022 29.5  27.0 - 31.0 pg Final    MCHC 05/09/2022 32.8  32.0 - 36.0 g/dL Final    RDW 05/09/2022 17.6 (A) 11.5 - 14.5 % Final    Platelets 05/09/2022 212  150 - 450 K/uL Final    MPV 05/09/2022 8.8 (A) 9.2 - 12.9 fL Final    Immature Granulocytes 05/09/2022 0.2  0.0 - 0.5 % Final    Gran # (ANC) 05/09/2022 3.6  1.8 - 7.7 K/uL Final    Immature Grans (Abs) 05/09/2022 0.01  0.00 - 0.04 K/uL Final    Lymph # 05/09/2022 1.6  1.0 - 4.8 K/uL Final    Mono # 05/09/2022 0.7  0.3 - 1.0 K/uL Final    Eos # 05/09/2022 0.1  0.0 - 0.5 K/uL Final    Baso # 05/09/2022 0.03  0.00 - 0.20 K/uL Final    nRBC 05/09/2022 0  0 /100 WBC Final    Gran % 05/09/2022 59.5  38.0 - 73.0 % Final    Lymph % 05/09/2022 26.5  18.0 - 48.0 % Final    Mono % 05/09/2022 12.0  4.0 - 15.0 % Final    Eosinophil % 05/09/2022 1.3  0.0 - 8.0 % Final    Basophil % 05/09/2022 0.5  0.0 - 1.9 % Final    Differential Method 05/09/2022 Automated   Final    Sodium 05/09/2022 138  136 - 145 mmol/L Final    Potassium 05/09/2022 3.3 (A) 3.5 - 5.1 mmol/L Final    Chloride 05/09/2022 101  95 - 110 mmol/L Final    CO2 05/09/2022 22 (A) 23 - 29 mmol/L Final    Glucose 05/09/2022 124 (A) 70 - 110 mg/dL Final    BUN 05/09/2022 11  8 - 23 mg/dL Final    Creatinine 05/09/2022 0.8  0.5 - 1.4 mg/dL Final    Calcium 05/09/2022 9.2  8.7 - 10.5 mg/dL Final    Total Protein 05/09/2022 7.1  6.0 - 8.4 g/dL Final    Albumin 05/09/2022 2.7 (A) 3.5 - 5.2 g/dL Final    Total Bilirubin 05/09/2022 3.4 (A) 0.1 - 1.0 mg/dL Final    Alkaline Phosphatase 05/09/2022 286 (A) 55 - 135 U/L Final    AST 05/09/2022 14  10 - 40 U/L Final    ALT 05/09/2022 9 (A) 10 - 44 U/L Final    Anion Gap 05/09/2022 15  8 - 16 mmol/L Final    eGFR if African American 05/09/2022 >60   >60 mL/min/1.73 m^2 Final    eGFR if non African American 05/09/2022 >60  >60 mL/min/1.73 m^2 Final      ?   Tumor markers   ?   ?   Imaging: CT Chest Abdomen Pelvis With Contrast (xpd)  Narrative: EXAMINATION:  CT CHEST ABDOMEN PELVIS WITH CONTRAST (XPD)    CLINICAL HISTORY:  pancreatic cancer;Other specified diseases of pancreas    TECHNIQUE:  Low dose axial images, sagittal and coronal reformations were obtained from the thoracic inlet to the pubic synthesis following the IV administration of 100 mL of Omnipaque 350. All CT scans at this location are performed using dose modulation techniques as appropriate to a performed exam including the following: Automated exposure control; adjustment of the mA and/or kV  according to patient size.    COMPARISON:  None    FINDINGS:  Thoracic soft tissues: No significant abnormality.    Aorta: No aneurysm.  Moderately advanced aortic and coronary artery atherosclerotic calcification.  Median sternotomy wires intact.  Suggestion of post CABG changes.  There are three branching vessels at the arch.    Heart: Moderate cardiomegaly.  AICD noted.    Ricarda/Mediastinum: No significant lymphadenopathy    Lungs: Mild ground-glass micronodularity posterior segment right upper lobe most likely representing infectious and bronchiolitis.  No suspicious pulmonary nodules.    Liver: 5.3 x 3.7 x 3.4 cm hypoenhancing lesion in the inferior right liver highly suspicious for metastatic disease.  Additional 1.6 cm lesion at the inferior right liver also suspicious.    Gallbladder: Pericholecystic fluid, nonspecific.  Cholelithiasis without evidence of cholecystitis.    Bile Ducts: Pneumobilia noted.  Mild intrahepatic biliary dilation.  Metallic common bile duct stent appears patent.    Pancreas: Ill-defined hypoenhancing 2.8 x 1.7 cm pancreatic head mass with associated chronic severe pancreatic ductal dilation and pancreatic atrophy.    Mass abuts the superior mesenteric vein.  No encasement  of mesenteric arteries.    Spleen: Unremarkable.    Adrenals: Unremarkable.    Kidneys/ Ureters: Normal in size and location. Normal concentration and excretion of contrast. No hydronephrosis or nephrolithiasis. No ureteral dilatation. Bilateral perinephric fat stranding, nonspecific    Bladder: No evidence of wall thickening.    Reproductive organs: Unremarkable.    GI Tract/Mesentery: No evidence of bowel obstruction or inflammation. Normal appendix.  Moderate volume stool throughout the colon.    Peritoneal Space: No ascites. No free air.    Retroperitoneum: 2.5 x 1.4 cm aortocaval lymph node.    Abdominal wall: Unremarkable.    Vasculature: Aortoiliac advanced atherosclerotic calcification. No aneurysm.    Bones: No acute fracture.  Impression: 5.3 cm hypoenhancing lesion in the inferior right liver highly suspicious for metastatic disease. Additional 1.6 cm lesion at the inferior right liver also suspicious.    No evidence of metastatic disease to the chest.    Ill-defined hypoenhancing 2.8 cm pancreatic head mass. 2.5 x 1.4 cm aortocaval retroperitoneal lymph node.    Mild ground-glass micronodularity posterior segment right upper lobe most likely representing infectious and bronchiolitis.  No suspicious pulmonary nodules.    Electronically signed by: Saw Gregory  Date:    05/06/2022  Time:    15:53     ?      Pathology:  Pathology Results  (Last 10 years)    None           ?   Assessment/Plan:       1. Pancreatic mass          Pancreatic mass  Probable pancreatic cancer.  Noted positive malignant cells from common bile duct brushing.  Noted normal serum tumor marker CA 19 9. Reviewed ultrasound results with family and patient and answered their questions.    Staging CT scan from 05/06/2022 showed a 5.3 cm hypoenhancing lesion in the inferior right liver suspicious for metastatic disease, additional 1.6 cm lesion at the inferior right liver.  Also reported and ill-defined hypoenhancing 2.8 cm pancreatic head  mass and 2.5 x 1.4 cm aortocaval retroperitoneal lymph node.    Advised patient that based on the above radiographic study, it appears he has and unresectable pancreatic cancer.  Will proceed with biopsy of the liver lesion for molecular studies.  Have ordered germline testing, awaiting results.    He understands that his disease is incurable but can be treated.  Unfortunately with ejection fraction of 15%, his eligibility and tolerating to chemotherapy is highly questionable.    I recommended hospice/comfort care however patient insisted on presenting his case to the tumor conference.  He is to return in 1-2 weeks with repeat labs or sooner if needed.      ?Pancreatic mass  -     CBC Auto Differential; Future; Expected date: 05/09/2022  -     Comprehensive Metabolic Panel; Future; Expected date: 05/09/2022    ?   Follow-Up: Follow up in about 2 weeks (around 5/23/2022).    JOSH FOUNTAIN Md., Ph.D  Hematology & Oncology Department  Phone #: [phone]

## 2022-05-13 ENCOUNTER — TUMOR BOARD CONFERENCE (OUTPATIENT)
Dept: HEMATOLOGY/ONCOLOGY | Facility: CLINIC | Age: 67
End: 2022-05-13
Payer: MEDICARE

## 2022-05-13 ENCOUNTER — TELEPHONE (OUTPATIENT)
Dept: HEMATOLOGY/ONCOLOGY | Facility: CLINIC | Age: 67
End: 2022-05-13
Payer: MEDICARE

## 2022-05-13 NOTE — TELEPHONE ENCOUNTER
See tumor board note. LM for pt with appt details for follow up apt with Dr Cole for 5/17 at 1000am at the cancer center.  Direct number left in message for pt to call back if appt not good for him.     Pt called back and will attend apt as set

## 2022-05-13 NOTE — PROGRESS NOTES
Tumor Board Documentation      Avery Earl was presented by Dr. Hardeep Cole at our Tumor Board on 5/13/2022, which included representatives from (P) Medical Oncology, Hematology, Radiation Oncology, Surgical Oncology, Pathology, Navigation, Genetics, Plastic Surgery, Radiology, Gastrointestinal, Pulmonology, Urology.    Avery currently presents as (P) a current patient with (P) Pancreatic cancer, with history of the following treatments: (P) Surgical Intervention(s) (stent).    Additionally, we reviewed previous medical and familial history, history of present illness, and recent lab results along with all available histopathologic and imaging studies. The tumor board considered available treatment options and made the following recommendations:  (P) Chemotherapy     Palliative Care Consult/ discuss chemotherapy with Gemzar and Abraxane    The following procedures/referrals were also placed: No orders of the defined types were placed in this encounter.      Clinical Trial Status: (P) Not discussed     National site-specific guidelines were discussed with respect to the case.    Tumor board is a meeting of clinicians from various specialty areas who evaluate and discuss patients for whom a multidisciplinary approach is being considered. Final determinations in the plan of care are those of the provider(s). The responsibility for follow up of recommendations given during tumor board is that of the provider.     Lisbeth Tobias RN

## 2022-05-16 NOTE — ASSESSMENT & PLAN NOTE
Probable pancreatic cancer.  Noted positive malignant cells from common bile duct brushing.  Noted normal serum tumor marker CA 19 9. Reviewed ultrasound results with family and patient and answered their questions.     Staging CT scan from 05/06/2022 showed a 5.3 cm hypoenhancing lesion in the inferior right liver suspicious for metastatic disease, additional 1.6 cm lesion at the inferior right liver.  Also reported and ill-defined hypoenhancing 2.8 cm pancreatic head mass and 2.5 x 1.4 cm aortocaval retroperitoneal lymph node.     Advised patient that based on the above radiographic study, it appears he has and unresectable pancreatic cancer.      Discussed case at tumor board, based on risk of anesthesia, the board recommended against tissue biopsy. Have ordered germline testing, awaiting results.     He understands that his disease is incurable but can be treated.     Regimen:  Gemcitabine plus Abraxane    Most recent 2D-ECHO showed ejection fraction of 15%.    Will repeat ECHO. Patient still indecisive as to whether to proceed with chemotherapy.  He understands that there is risk of worsening cardiac function.     Plan for repeat ECHO. He will contact us before the end of the week with his decision.

## 2022-05-17 ENCOUNTER — OFFICE VISIT (OUTPATIENT)
Dept: HEMATOLOGY/ONCOLOGY | Facility: CLINIC | Age: 67
End: 2022-05-17
Payer: MEDICARE

## 2022-05-17 ENCOUNTER — HOSPITAL ENCOUNTER (OUTPATIENT)
Dept: CARDIOLOGY | Facility: HOSPITAL | Age: 67
Discharge: HOME | End: 2022-05-17
Attending: INTERNAL MEDICINE
Payer: MEDICARE

## 2022-05-17 ENCOUNTER — DOCUMENTATION ONLY (OUTPATIENT)
Dept: HEMATOLOGY/ONCOLOGY | Facility: CLINIC | Age: 67
End: 2022-05-17

## 2022-05-17 ENCOUNTER — TELEPHONE (OUTPATIENT)
Dept: GASTROENTEROLOGY | Facility: CLINIC | Age: 67
End: 2022-05-17
Payer: MEDICARE

## 2022-05-17 VITALS
HEIGHT: 74 IN | WEIGHT: 197.56 LBS | HEART RATE: 86 BPM | BODY MASS INDEX: 25.35 KG/M2 | OXYGEN SATURATION: 100 % | DIASTOLIC BLOOD PRESSURE: 61 MMHG | SYSTOLIC BLOOD PRESSURE: 97 MMHG | TEMPERATURE: 99 F

## 2022-05-17 DIAGNOSIS — I25.5 ISCHEMIC CARDIOMYOPATHY: ICD-10-CM

## 2022-05-17 DIAGNOSIS — I50.9 CONGESTIVE HEART FAILURE, UNSPECIFIED HF CHRONICITY, UNSPECIFIED HEART FAILURE TYPE: ICD-10-CM

## 2022-05-17 DIAGNOSIS — K86.89 PANCREATIC MASS: Primary | ICD-10-CM

## 2022-05-17 DIAGNOSIS — Z95.810 ICD (IMPLANTABLE CARDIOVERTER-DEFIBRILLATOR) IN PLACE: Primary | ICD-10-CM

## 2022-05-17 DIAGNOSIS — Z95.810 ICD (IMPLANTABLE CARDIOVERTER-DEFIBRILLATOR) IN PLACE: ICD-10-CM

## 2022-05-17 DIAGNOSIS — K86.89 PANCREATIC MASS: ICD-10-CM

## 2022-05-17 PROCEDURE — 99215 PR OFFICE/OUTPT VISIT, EST, LEVL V, 40-54 MIN: ICD-10-PCS | Mod: S$GLB,,, | Performed by: INTERNAL MEDICINE

## 2022-05-17 PROCEDURE — 3288F FALL RISK ASSESSMENT DOCD: CPT | Mod: CPTII,S$GLB,, | Performed by: INTERNAL MEDICINE

## 2022-05-17 PROCEDURE — 3074F SYST BP LT 130 MM HG: CPT | Mod: CPTII,S$GLB,, | Performed by: INTERNAL MEDICINE

## 2022-05-17 PROCEDURE — 93282 CARDIAC DEVICE CHECK - IN CLINIC & HOSPITAL: ICD-10-PCS | Mod: 26,,, | Performed by: INTERNAL MEDICINE

## 2022-05-17 PROCEDURE — 1159F PR MEDICATION LIST DOCUMENTED IN MEDICAL RECORD: ICD-10-PCS | Mod: CPTII,S$GLB,, | Performed by: INTERNAL MEDICINE

## 2022-05-17 PROCEDURE — 3078F PR MOST RECENT DIASTOLIC BLOOD PRESSURE < 80 MM HG: ICD-10-PCS | Mod: CPTII,S$GLB,, | Performed by: INTERNAL MEDICINE

## 2022-05-17 PROCEDURE — 99999 PR PBB SHADOW E&M-EST. PATIENT-LVL III: ICD-10-PCS | Mod: PBBFAC,,, | Performed by: INTERNAL MEDICINE

## 2022-05-17 PROCEDURE — 1101F PT FALLS ASSESS-DOCD LE1/YR: CPT | Mod: CPTII,S$GLB,, | Performed by: INTERNAL MEDICINE

## 2022-05-17 PROCEDURE — 1157F ADVNC CARE PLAN IN RCRD: CPT | Mod: CPTII,S$GLB,, | Performed by: INTERNAL MEDICINE

## 2022-05-17 PROCEDURE — 3008F PR BODY MASS INDEX (BMI) DOCUMENTED: ICD-10-PCS | Mod: CPTII,S$GLB,, | Performed by: INTERNAL MEDICINE

## 2022-05-17 PROCEDURE — 93282 PRGRMG EVAL IMPLANTABLE DFB: CPT | Mod: 26,,, | Performed by: INTERNAL MEDICINE

## 2022-05-17 PROCEDURE — 99215 OFFICE O/P EST HI 40 MIN: CPT | Mod: S$GLB,,, | Performed by: INTERNAL MEDICINE

## 2022-05-17 PROCEDURE — 1157F PR ADVANCE CARE PLAN OR EQUIV PRESENT IN MEDICAL RECORD: ICD-10-PCS | Mod: CPTII,S$GLB,, | Performed by: INTERNAL MEDICINE

## 2022-05-17 PROCEDURE — 93282 PRGRMG EVAL IMPLANTABLE DFB: CPT

## 2022-05-17 PROCEDURE — 1126F PR PAIN SEVERITY QUANTIFIED, NO PAIN PRESENT: ICD-10-PCS | Mod: CPTII,S$GLB,, | Performed by: INTERNAL MEDICINE

## 2022-05-17 PROCEDURE — 1159F MED LIST DOCD IN RCRD: CPT | Mod: CPTII,S$GLB,, | Performed by: INTERNAL MEDICINE

## 2022-05-17 PROCEDURE — 1126F AMNT PAIN NOTED NONE PRSNT: CPT | Mod: CPTII,S$GLB,, | Performed by: INTERNAL MEDICINE

## 2022-05-17 PROCEDURE — 3288F PR FALLS RISK ASSESSMENT DOCUMENTED: ICD-10-PCS | Mod: CPTII,S$GLB,, | Performed by: INTERNAL MEDICINE

## 2022-05-17 PROCEDURE — 99999 PR PBB SHADOW E&M-EST. PATIENT-LVL III: CPT | Mod: PBBFAC,,, | Performed by: INTERNAL MEDICINE

## 2022-05-17 PROCEDURE — 3074F PR MOST RECENT SYSTOLIC BLOOD PRESSURE < 130 MM HG: ICD-10-PCS | Mod: CPTII,S$GLB,, | Performed by: INTERNAL MEDICINE

## 2022-05-17 PROCEDURE — 3008F BODY MASS INDEX DOCD: CPT | Mod: CPTII,S$GLB,, | Performed by: INTERNAL MEDICINE

## 2022-05-17 PROCEDURE — 1101F PR PT FALLS ASSESS DOC 0-1 FALLS W/OUT INJ PAST YR: ICD-10-PCS | Mod: CPTII,S$GLB,, | Performed by: INTERNAL MEDICINE

## 2022-05-17 PROCEDURE — 3078F DIAST BP <80 MM HG: CPT | Mod: CPTII,S$GLB,, | Performed by: INTERNAL MEDICINE

## 2022-05-17 NOTE — TELEPHONE ENCOUNTER
Blood thinner request          VAHID Villarreal LPN  See below - thanks             Previous Messages       ----- Message -----   From: Frances Infante MD   Sent: 5/6/2022   9:29 AM CDT   To: Lilly Duval RN   Subject: RE: Eliquis                                       CAN BE OFF ELIQUIS FOR 48 HOURS PRE OP   THANKS   ----- Message -----   From: Lilly Duval RN   Sent: 5/5/2022   3:28 PM CDT   To: Frances Infante MD   Subject: FW: Eliquis                                         ----- Message -----   From: Jessie Rossi LPN   Sent: 5/5/2022   3:11 PM CDT   To: Frances Infante MD, Naresh Daniels Staff   Subject: Eliquis                                           Patient is scheduled for EUS on 5/13/22 with Dr. Darby.  He currently takes Eliquis.  Please advise     Thank you,   Leann

## 2022-05-17 NOTE — TELEPHONE ENCOUNTER
----- Message from Lilly Duval RN sent at 5/6/2022  2:38 PM CDT -----  Regarding: FW: Eliquis  See below - thanks   ----- Message -----  From: Frances Infante MD  Sent: 5/6/2022   9:29 AM CDT  To: Lilly Duval RN  Subject: RE: Eliquis                                      CAN BE OFF ELIQUIS FOR 48 HOURS PRE OP   THANKS   ----- Message -----  From: Lilly Duval RN  Sent: 5/5/2022   3:28 PM CDT  To: Frances Infante MD  Subject: FW: Eliquis                                        ----- Message -----  From: Jessie Rossi LPN  Sent: 5/5/2022   3:11 PM CDT  To: Frances Infante MD, Naresh Daniels Staff  Subject: Eliquis                                          Patient is scheduled for EUS on 5/13/22 with Dr. Darby.  He currently takes Eliquis.  Please advise    Thank you,  Leann

## 2022-05-17 NOTE — PROGRESS NOTES
Subjective:   Date of Visit: 5/17/22   ?   ?    REFERRING PROVIDER: No referring provider defined for this encounter.   ?   CHIEF COMPLAINT:  Pancreatic cancer???????   ?   ONCOLOGIC DIAGNOSIS:  Pancreatic adenocarcinoma  ?   CURRENT TREATMENT:  None    PAST TREATMENT:  None  ?   ONCOLOGIC HISTORY:     Ultrasound abdomen 04/05/2022:    Impression:     3.6 cm solid mass seen at the level the head of the pancreas with significant ductal dilatation of the pancreatic duct measuring up to 11 mm.   Moderate intrahepatic and common bile duct dilatation. 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  Recommend ERCP with endoscopic ultrasound and tissue sampling.     1. Bile duct stricture, biopsy:   - Minute fragment of benign bile duct epithelium,  negative  for dysplasia or   malignancy   2. Common bile duct brushing (1 thin prep):   -  Positive  for malignancy (see comment)        HPI:  66-year-old male with history of CHF, PAD was referred to us due to recent finding of positive malignant cells in the common bile duct brushing.  Apparently he was recently seen in the emergency room due to painless jaundice.  Ultrasound obtained at that time revealed a 3.6 cm solid mass at the head of pancreas.    Common bile duct brushings returned positive for malignancy. EUS unable to be performed at the time due to complication with scope per GI.  Given positive malignant cells, he was advised to follow with Oncology for further management.    He presents today to discuss management. No new complaints      Review of Systems   Constitutional: Positive for fatigue. Negative for activity change, appetite change, chills, fever and unexpected weight change.   HENT: Negative for hearing loss, mouth sores, nosebleeds, sore throat, tinnitus, trouble swallowing and voice change.    Eyes: Negative for visual disturbance.   Respiratory: Negative for cough, chest tightness, shortness of breath  and wheezing.    Cardiovascular: Negative for chest pain, palpitations and leg swelling.   Gastrointestinal: Negative for abdominal pain, anal bleeding, blood in stool, constipation, diarrhea and vomiting.   Genitourinary: Negative for frequency, hematuria and testicular pain.   Musculoskeletal: Negative for arthralgias, back pain, gait problem and neck pain.   Skin: Negative for color change, pallor, rash and wound.   Allergic/Immunologic: Negative for immunocompromised state.   Neurological: Positive for weakness. Negative for seizures, syncope, numbness and headaches.   Hematological: Negative for adenopathy. Does not bruise/bleed easily.   Psychiatric/Behavioral: Negative for agitation, confusion, decreased concentration, hallucinations and sleep disturbance. The patient is not nervous/anxious.        ?   PAST MEDICAL HISTORY:   Past Medical History:   Diagnosis Date    Arthritis     Cardiomyopathy     Ischemic    CHF (congestive heart failure)     Coronary artery disease     Dyslipidemia     Hyperlipidemia     Hypertension     Mitral insufficiency     Myocardial infarction     Peripheral arterial disease     Pneumonia 12/2021    Pulmonary hypertension     ?     PAST SURGICAL HISTORY:   Past Surgical History:   Procedure Laterality Date    CORONARY ARTERY BYPASS GRAFT      Lima to LAD, saphenous vein graft to right PDA    ENDOSCOPIC ULTRASOUND OF UPPER GASTROINTESTINAL TRACT N/A 4/8/2022    Procedure: ULTRASOUND, UPPER GI TRACT, ENDOSCOPIC;  Surgeon: Merritt Darby MD;  Location: [facility];  Service: Endoscopy;  Laterality: N/A;    ERCP N/A 4/8/2022    Procedure: ERCP (ENDOSCOPIC RETROGRADE CHOLANGIOPANCREATOGRAPHY);  Surgeon: Merritt Darby MD;  Location: [facility];  Service: Endoscopy;  Laterality: N/A;    INSERTION OF IMPLANTABLE CARDIOVERTER-DEFIBRILLATOR (ICD) GENERATOR WITH TWO EXISTING LEADS        ?   ALLERGIES:   Allergies as of 05/17/2022    (No Known Allergies)       ?   MEDICATIONS:?   Outpatient Medications Marked as Taking for the 22 encounter (Office Visit) with Hardeep Cole MD   Medication Sig Dispense Refill    amoxicillin-clavulanate 875-125mg (AUGMENTIN) 875-125 mg per tablet Take 1 tablet by mouth every 12 (twelve) hours. 10 tablet 0    apixaban (ELIQUIS) 5 mg Tab Take 5 mg by mouth once daily.      aspirin 81 MG Chew Take 81 mg by mouth once daily.      b complex vitamins capsule Take 1 capsule by mouth once daily.      bumetanide (BUMEX) 1 MG tablet Take 1 mg by mouth once daily.      cholecalciferol, vitamin D3, (VITAMIN D3) 25 mcg (1,000 unit) capsule Take 1,000 Units by mouth once daily.      cyproheptadine (PERIACTIN) 4 mg tablet Take 4 mg by mouth 3 (three) times daily with meals.      DOBUTamine (DOBUTREX) 1,000 mg/250 mL (4,000 mcg/mL) infusion Inject 227 mcg/min into the vein continuous.      ENTRESTO 49-51 mg per tablet Take 49-51 tablets by mouth 2 (two) times a day.      omega-3/dha/epa/ala/vitamin D3 (OMEGA-3-DHA-EPA-ALA-VIT D3 ORAL) Take 2 capsules by mouth once daily at 6am.      potassium chloride SA (K-DUR,KLOR-CON) 20 MEQ tablet Take 20 mEq by mouth once daily.      spironolactone (ALDACTONE) 25 MG tablet Take 25 mg by mouth once daily.        ?   SOCIAL HISTORY:?   Social History     Tobacco Use    Smoking status: Former Smoker     Types: Cigarettes     Quit date:      Years since quittin.3    Smokeless tobacco: Never Used   Substance Use Topics    Alcohol use: Not Currently        ?   FAMILY HISTORY:   family history is not on file.   ?     Objective:      Physical Exam  Constitutional:       General: He is not in acute distress.     Appearance: He is well-developed.   HENT:      Head: Normocephalic and atraumatic.      Right Ear: External ear normal.      Left Ear: External ear normal.      Mouth/Throat:      Pharynx: No oropharyngeal exudate.   Eyes:      General: No scleral icterus.        Right eye: No  discharge.         Left eye: No discharge.      Conjunctiva/sclera: Conjunctivae normal.      Pupils: Pupils are equal, round, and reactive to light.   Neck:      Thyroid: No thyromegaly.   Cardiovascular:      Rate and Rhythm: Normal rate and regular rhythm.      Heart sounds: Normal heart sounds. No murmur heard.  Pulmonary:      Effort: Pulmonary effort is normal. No respiratory distress.      Breath sounds: Normal breath sounds. No wheezing.   Chest:      Chest wall: No tenderness.   Breasts:      Right: No supraclavicular adenopathy.      Left: No supraclavicular adenopathy.       Abdominal:      General: Bowel sounds are normal. There is no distension.      Palpations: Abdomen is soft. There is no mass.      Tenderness: There is no abdominal tenderness. There is no rebound.   Musculoskeletal:         General: No tenderness. Normal range of motion.      Cervical back: Normal range of motion and neck supple.   Lymphadenopathy:      Cervical: No cervical adenopathy.      Right cervical: No superficial cervical adenopathy.     Left cervical: No superficial cervical adenopathy.      Upper Body:      Right upper body: No supraclavicular or pectoral adenopathy.      Left upper body: No supraclavicular or pectoral adenopathy.   Skin:     General: Skin is warm and dry.      Capillary Refill: Capillary refill takes 2 to 3 seconds.      Coloration: Skin is not pale.      Findings: No erythema or rash.   Neurological:      Mental Status: He is alert and oriented to person, place, and time.      Cranial Nerves: No cranial nerve deficit.      Sensory: No sensory deficit.   Psychiatric:         Behavior: Behavior normal.         Judgment: Judgment normal.         ?   Vitals:    05/17/22 0947   BP: 97/61   Pulse: 86   Temp: 98.8 °F (37.1 °C)      ?     ECOG SCORE    2 - Capable of all selfcare but unable to carry out any work activities, active > 50% of hours       ?   Laboratory:  ?   No visits with results within 1 Day(s)  from this visit.   Latest known visit with results is:   Lab Visit on 05/09/2022   Component Date Value Ref Range Status    WBC 05/09/2022 6.01  3.90 - 12.70 K/uL Final    RBC 05/09/2022 2.92 (A) 4.60 - 6.20 M/uL Final    Hemoglobin 05/09/2022 8.6 (A) 14.0 - 18.0 g/dL Final    Hematocrit 05/09/2022 26.2 (A) 40.0 - 54.0 % Final    MCV 05/09/2022 90  82 - 98 fL Final    MCH 05/09/2022 29.5  27.0 - 31.0 pg Final    MCHC 05/09/2022 32.8  32.0 - 36.0 g/dL Final    RDW 05/09/2022 17.6 (A) 11.5 - 14.5 % Final    Platelets 05/09/2022 212  150 - 450 K/uL Final    MPV 05/09/2022 8.8 (A) 9.2 - 12.9 fL Final    Immature Granulocytes 05/09/2022 0.2  0.0 - 0.5 % Final    Gran # (ANC) 05/09/2022 3.6  1.8 - 7.7 K/uL Final    Immature Grans (Abs) 05/09/2022 0.01  0.00 - 0.04 K/uL Final    Lymph # 05/09/2022 1.6  1.0 - 4.8 K/uL Final    Mono # 05/09/2022 0.7  0.3 - 1.0 K/uL Final    Eos # 05/09/2022 0.1  0.0 - 0.5 K/uL Final    Baso # 05/09/2022 0.03  0.00 - 0.20 K/uL Final    nRBC 05/09/2022 0  0 /100 WBC Final    Gran % 05/09/2022 59.5  38.0 - 73.0 % Final    Lymph % 05/09/2022 26.5  18.0 - 48.0 % Final    Mono % 05/09/2022 12.0  4.0 - 15.0 % Final    Eosinophil % 05/09/2022 1.3  0.0 - 8.0 % Final    Basophil % 05/09/2022 0.5  0.0 - 1.9 % Final    Differential Method 05/09/2022 Automated   Final    Sodium 05/09/2022 138  136 - 145 mmol/L Final    Potassium 05/09/2022 3.3 (A) 3.5 - 5.1 mmol/L Final    Chloride 05/09/2022 101  95 - 110 mmol/L Final    CO2 05/09/2022 22 (A) 23 - 29 mmol/L Final    Glucose 05/09/2022 124 (A) 70 - 110 mg/dL Final    BUN 05/09/2022 11  8 - 23 mg/dL Final    Creatinine 05/09/2022 0.8  0.5 - 1.4 mg/dL Final    Calcium 05/09/2022 9.2  8.7 - 10.5 mg/dL Final    Total Protein 05/09/2022 7.1  6.0 - 8.4 g/dL Final    Albumin 05/09/2022 2.7 (A) 3.5 - 5.2 g/dL Final    Total Bilirubin 05/09/2022 3.4 (A) 0.1 - 1.0 mg/dL Final    Alkaline Phosphatase 05/09/2022 286 (A) 55 - 135  U/L Final    AST 05/09/2022 14  10 - 40 U/L Final    ALT 05/09/2022 9 (A) 10 - 44 U/L Final    Anion Gap 05/09/2022 15  8 - 16 mmol/L Final    eGFR if African American 05/09/2022 >60  >60 mL/min/1.73 m^2 Final    eGFR if non African American 05/09/2022 >60  >60 mL/min/1.73 m^2 Final      ?   Tumor markers   ?   ?   Imaging: CT Chest Abdomen Pelvis With Contrast (xpd)  Narrative: EXAMINATION:  CT CHEST ABDOMEN PELVIS WITH CONTRAST (XPD)    CLINICAL HISTORY:  pancreatic cancer;Other specified diseases of pancreas    TECHNIQUE:  Low dose axial images, sagittal and coronal reformations were obtained from the thoracic inlet to the pubic synthesis following the IV administration of 100 mL of Omnipaque 350. All CT scans at this location are performed using dose modulation techniques as appropriate to a performed exam including the following: Automated exposure control; adjustment of the mA and/or kV  according to patient size.    COMPARISON:  None    FINDINGS:  Thoracic soft tissues: No significant abnormality.    Aorta: No aneurysm.  Moderately advanced aortic and coronary artery atherosclerotic calcification.  Median sternotomy wires intact.  Suggestion of post CABG changes.  There are three branching vessels at the arch.    Heart: Moderate cardiomegaly.  AICD noted.    Ricarda/Mediastinum: No significant lymphadenopathy    Lungs: Mild ground-glass micronodularity posterior segment right upper lobe most likely representing infectious and bronchiolitis.  No suspicious pulmonary nodules.    Liver: 5.3 x 3.7 x 3.4 cm hypoenhancing lesion in the inferior right liver highly suspicious for metastatic disease.  Additional 1.6 cm lesion at the inferior right liver also suspicious.    Gallbladder: Pericholecystic fluid, nonspecific.  Cholelithiasis without evidence of cholecystitis.    Bile Ducts: Pneumobilia noted.  Mild intrahepatic biliary dilation.  Metallic common bile duct stent appears patent.    Pancreas: Ill-defined  hypoenhancing 2.8 x 1.7 cm pancreatic head mass with associated chronic severe pancreatic ductal dilation and pancreatic atrophy.    Mass abuts the superior mesenteric vein.  No encasement of mesenteric arteries.    Spleen: Unremarkable.    Adrenals: Unremarkable.    Kidneys/ Ureters: Normal in size and location. Normal concentration and excretion of contrast. No hydronephrosis or nephrolithiasis. No ureteral dilatation. Bilateral perinephric fat stranding, nonspecific    Bladder: No evidence of wall thickening.    Reproductive organs: Unremarkable.    GI Tract/Mesentery: No evidence of bowel obstruction or inflammation. Normal appendix.  Moderate volume stool throughout the colon.    Peritoneal Space: No ascites. No free air.    Retroperitoneum: 2.5 x 1.4 cm aortocaval lymph node.    Abdominal wall: Unremarkable.    Vasculature: Aortoiliac advanced atherosclerotic calcification. No aneurysm.    Bones: No acute fracture.  Impression: 5.3 cm hypoenhancing lesion in the inferior right liver highly suspicious for metastatic disease. Additional 1.6 cm lesion at the inferior right liver also suspicious.    No evidence of metastatic disease to the chest.    Ill-defined hypoenhancing 2.8 cm pancreatic head mass. 2.5 x 1.4 cm aortocaval retroperitoneal lymph node.    Mild ground-glass micronodularity posterior segment right upper lobe most likely representing infectious and bronchiolitis.  No suspicious pulmonary nodules.    Electronically signed by: Saw Gregory  Date:    05/06/2022  Time:    15:53     ?      Pathology:  Pathology Results  (Last 10 years)    None           ?   Assessment/Plan:       1. Pancreatic mass          Pancreatic mass  Probable pancreatic cancer.  Noted positive malignant cells from common bile duct brushing.  Noted normal serum tumor marker CA 19 9. Reviewed ultrasound results with family and patient and answered their questions.     Staging CT scan from 05/06/2022 showed a 5.3 cm hypoenhancing  lesion in the inferior right liver suspicious for metastatic disease, additional 1.6 cm lesion at the inferior right liver.  Also reported and ill-defined hypoenhancing 2.8 cm pancreatic head mass and 2.5 x 1.4 cm aortocaval retroperitoneal lymph node.     Advised patient that based on the above radiographic study, it appears he has and unresectable pancreatic cancer.      Discussed case at tumor board, based on risk of anesthesia, the board recommended against tissue biopsy. Have ordered germline testing, awaiting results.     He understands that his disease is incurable but can be treated.     Regimen:  Gemcitabine plus Abraxane    Most recent 2D-ECHO showed ejection fraction of 15%.    Will repeat ECHO. Patient still indecisive as to whether to proceed with chemotherapy.  He understands that there is risk of worsening cardiac function.     Plan for repeat ECHO. He will contact us before the end of the week with his decision.       ?Pancreatic mass  -     CBC Auto Differential; Future; Expected date: 05/17/2022  -     Comprehensive Metabolic Panel; Future; Expected date: 05/17/2022    ?   Follow-Up: Follow up in about 1 week (around 5/24/2022).    JOSH FOUNTAIN Md., Ph.D  Hematology & Oncology Department  Phone #: [phone]

## 2022-05-18 NOTE — PROGRESS NOTES
SWer met with patient and spouse on today about Cancer coverage. Pt provided SWer claim information to research for him. SWer will f/u with company and remain available.

## 2022-05-23 ENCOUNTER — HOSPITAL ENCOUNTER (OUTPATIENT)
Dept: CARDIOLOGY | Facility: HOSPITAL | Age: 67
Discharge: HOME | End: 2022-05-23
Attending: INTERNAL MEDICINE
Payer: MEDICARE

## 2022-05-23 VITALS
DIASTOLIC BLOOD PRESSURE: 60 MMHG | HEIGHT: 74 IN | BODY MASS INDEX: 25.28 KG/M2 | SYSTOLIC BLOOD PRESSURE: 100 MMHG | HEART RATE: 85 BPM | WEIGHT: 197 LBS

## 2022-05-23 DIAGNOSIS — K86.89 PANCREATIC MASS: ICD-10-CM

## 2022-05-23 LAB
AORTIC ROOT ANNULUS: 3.22 CM
ASCENDING AORTA: 3.14 CM
AV INDEX (PROSTH): 0.32
AV MEAN GRADIENT: 8 MMHG
AV PEAK GRADIENT: 14 MMHG
AV VALVE AREA: 1.09 CM2
AV VELOCITY RATIO: 0.32
BSA FOR ECHO PROCEDURE: 2.16 M2
CV ECHO LV RWT: 0.38 CM
DOP CALC AO PEAK VEL: 1.86 M/S
DOP CALC AO VTI: 30.8 CM
DOP CALC LVOT AREA: 3.4 CM2
DOP CALC LVOT DIAMETER: 2.08 CM
DOP CALC LVOT PEAK VEL: 0.6 M/S
DOP CALC LVOT STROKE VOLUME: 33.62 CM3
DOP CALC RVOT PEAK VEL: 0.53 M/S
DOP CALC RVOT VTI: 7.7 CM
DOP CALCLVOT PEAK VEL VTI: 9.9 CM
E WAVE DECELERATION TIME: 132.61 MSEC
E/A RATIO: 3.88
E/E' RATIO: 22.55 M/S
ECHO LV POSTERIOR WALL: 1.2 CM (ref 0.6–1.1)
EJECTION FRACTION: 15 %
FRACTIONAL SHORTENING: 5 % (ref 28–44)
INTERVENTRICULAR SEPTUM: 1.24 CM (ref 0.6–1.1)
IVRT: 77.07 MSEC
LA MAJOR: 6.02 CM
LA MINOR: 5.98 CM
LA WIDTH: 4.7 CM
LEFT ATRIUM SIZE: 4.25 CM
LEFT ATRIUM VOLUME INDEX MOD: 45.9 ML/M2
LEFT ATRIUM VOLUME INDEX: 47.2 ML/M2
LEFT ATRIUM VOLUME MOD: 99.2 CM3
LEFT ATRIUM VOLUME: 101.87 CM3
LEFT INTERNAL DIMENSION IN SYSTOLE: 6.06 CM (ref 2.1–4)
LEFT VENTRICLE DIASTOLIC VOLUME INDEX: 96.41 ML/M2
LEFT VENTRICLE DIASTOLIC VOLUME: 208.25 ML
LEFT VENTRICLE MASS INDEX: 165 G/M2
LEFT VENTRICLE SYSTOLIC VOLUME INDEX: 85.1 ML/M2
LEFT VENTRICLE SYSTOLIC VOLUME: 183.91 ML
LEFT VENTRICULAR INTERNAL DIMENSION IN DIASTOLE: 6.4 CM (ref 3.5–6)
LEFT VENTRICULAR MASS: 357.25 G
LV LATERAL E/E' RATIO: 20.67 M/S
LV SEPTAL E/E' RATIO: 24.8 M/S
LVOT MG: 0.8 MMHG
LVOT MV: 0.42 CM/S
MV PEAK A VEL: 0.32 M/S
MV PEAK E VEL: 1.24 M/S
MV STENOSIS PRESSURE HALF TIME: 38.46 MS
MV VALVE AREA P 1/2 METHOD: 5.72 CM2
PISA TR MAX VEL: 3.68 M/S
PV MEAN GRADIENT: 0.56 MMHG
PV PEAK VELOCITY: 0.71 CM/S
RA MAJOR: 5.39 CM
RA PRESSURE: 3 MMHG
RA WIDTH: 4.82 CM
RIGHT VENTRICULAR END-DIASTOLIC DIMENSION: 4.59 CM
SINUS: 2.81 CM
STJ: 2.41 CM
TDI LATERAL: 0.06 M/S
TDI SEPTAL: 0.05 M/S
TDI: 0.06 M/S
TR MAX PG: 54 MMHG
TV REST PULMONARY ARTERY PRESSURE: 57 MMHG

## 2022-05-23 PROCEDURE — 93306 TTE W/DOPPLER COMPLETE: CPT | Mod: 26,,, | Performed by: STUDENT IN AN ORGANIZED HEALTH CARE EDUCATION/TRAINING PROGRAM

## 2022-05-23 PROCEDURE — 93356 MYOCRD STRAIN IMG SPCKL TRCK: CPT

## 2022-05-23 PROCEDURE — 93356 MYOCRD STRAIN IMG SPCKL TRCK: CPT | Mod: ,,, | Performed by: STUDENT IN AN ORGANIZED HEALTH CARE EDUCATION/TRAINING PROGRAM

## 2022-05-23 PROCEDURE — 93356 ECHO (CUPID ONLY): ICD-10-PCS | Mod: ,,, | Performed by: STUDENT IN AN ORGANIZED HEALTH CARE EDUCATION/TRAINING PROGRAM

## 2022-05-23 PROCEDURE — 93306 ECHO (CUPID ONLY): ICD-10-PCS | Mod: 26,,, | Performed by: STUDENT IN AN ORGANIZED HEALTH CARE EDUCATION/TRAINING PROGRAM

## 2022-05-23 NOTE — ASSESSMENT & PLAN NOTE
Probable pancreatic cancer.  Noted positive malignant cells from common bile duct brushing.  Noted normal serum tumor marker CA 19 9. Reviewed ultrasound results with family and patient and answered their questions.     Staging CT scan from 05/06/2022 showed a 5.3 cm hypoenhancing lesion in the inferior right liver suspicious for metastatic disease, additional 1.6 cm lesion at the inferior right liver.  Also reported and ill-defined hypoenhancing 2.8 cm pancreatic head mass and 2.5 x 1.4 cm aortocaval retroperitoneal lymph node.     Advised patient that based on the above radiographic study, it appears he has and unresectable pancreatic cancer.       Discussed case at tumor board, based on risk of anesthesia, the board recommended against tissue biopsy. Have ordered germline testing, awaiting results.     He understands that his disease is incurable but can be treated.      Regimen:  Gemcitabine plus Abraxane     Most recent 2D-ECHO showed ejection fraction of 15%. Repeated ECHO showed 15% EF.      Patient still indecisive as to whether to proceed with chemotherapy.  He understands that there is risk of worsening cardiac function.     He will contact us before the end of the week with his decision.

## 2022-05-24 ENCOUNTER — OFFICE VISIT (OUTPATIENT)
Dept: HEMATOLOGY/ONCOLOGY | Facility: CLINIC | Age: 67
End: 2022-05-24
Payer: MEDICARE

## 2022-05-24 VITALS
WEIGHT: 195.75 LBS | OXYGEN SATURATION: 99 % | HEIGHT: 74 IN | BODY MASS INDEX: 25.12 KG/M2 | RESPIRATION RATE: 20 BRPM | TEMPERATURE: 98 F | DIASTOLIC BLOOD PRESSURE: 61 MMHG | HEART RATE: 104 BPM | SYSTOLIC BLOOD PRESSURE: 94 MMHG

## 2022-05-24 DIAGNOSIS — K86.89 PANCREATIC MASS: ICD-10-CM

## 2022-05-24 PROCEDURE — 1126F AMNT PAIN NOTED NONE PRSNT: CPT | Mod: CPTII,S$GLB,, | Performed by: INTERNAL MEDICINE

## 2022-05-24 PROCEDURE — 1101F PT FALLS ASSESS-DOCD LE1/YR: CPT | Mod: CPTII,S$GLB,, | Performed by: INTERNAL MEDICINE

## 2022-05-24 PROCEDURE — 1159F MED LIST DOCD IN RCRD: CPT | Mod: CPTII,S$GLB,, | Performed by: INTERNAL MEDICINE

## 2022-05-24 PROCEDURE — 3288F PR FALLS RISK ASSESSMENT DOCUMENTED: ICD-10-PCS | Mod: CPTII,S$GLB,, | Performed by: INTERNAL MEDICINE

## 2022-05-24 PROCEDURE — 1157F PR ADVANCE CARE PLAN OR EQUIV PRESENT IN MEDICAL RECORD: ICD-10-PCS | Mod: CPTII,S$GLB,, | Performed by: INTERNAL MEDICINE

## 2022-05-24 PROCEDURE — 3074F SYST BP LT 130 MM HG: CPT | Mod: CPTII,S$GLB,, | Performed by: INTERNAL MEDICINE

## 2022-05-24 PROCEDURE — 99999 PR PBB SHADOW E&M-EST. PATIENT-LVL III: ICD-10-PCS | Mod: PBBFAC,,, | Performed by: INTERNAL MEDICINE

## 2022-05-24 PROCEDURE — 1101F PR PT FALLS ASSESS DOC 0-1 FALLS W/OUT INJ PAST YR: ICD-10-PCS | Mod: CPTII,S$GLB,, | Performed by: INTERNAL MEDICINE

## 2022-05-24 PROCEDURE — 3008F PR BODY MASS INDEX (BMI) DOCUMENTED: ICD-10-PCS | Mod: CPTII,S$GLB,, | Performed by: INTERNAL MEDICINE

## 2022-05-24 PROCEDURE — 3078F PR MOST RECENT DIASTOLIC BLOOD PRESSURE < 80 MM HG: ICD-10-PCS | Mod: CPTII,S$GLB,, | Performed by: INTERNAL MEDICINE

## 2022-05-24 PROCEDURE — 3288F FALL RISK ASSESSMENT DOCD: CPT | Mod: CPTII,S$GLB,, | Performed by: INTERNAL MEDICINE

## 2022-05-24 PROCEDURE — 1126F PR PAIN SEVERITY QUANTIFIED, NO PAIN PRESENT: ICD-10-PCS | Mod: CPTII,S$GLB,, | Performed by: INTERNAL MEDICINE

## 2022-05-24 PROCEDURE — 3078F DIAST BP <80 MM HG: CPT | Mod: CPTII,S$GLB,, | Performed by: INTERNAL MEDICINE

## 2022-05-24 PROCEDURE — 99215 OFFICE O/P EST HI 40 MIN: CPT | Mod: S$GLB,,, | Performed by: INTERNAL MEDICINE

## 2022-05-24 PROCEDURE — 3074F PR MOST RECENT SYSTOLIC BLOOD PRESSURE < 130 MM HG: ICD-10-PCS | Mod: CPTII,S$GLB,, | Performed by: INTERNAL MEDICINE

## 2022-05-24 PROCEDURE — 3008F BODY MASS INDEX DOCD: CPT | Mod: CPTII,S$GLB,, | Performed by: INTERNAL MEDICINE

## 2022-05-24 PROCEDURE — 1157F ADVNC CARE PLAN IN RCRD: CPT | Mod: CPTII,S$GLB,, | Performed by: INTERNAL MEDICINE

## 2022-05-24 PROCEDURE — 99999 PR PBB SHADOW E&M-EST. PATIENT-LVL III: CPT | Mod: PBBFAC,,, | Performed by: INTERNAL MEDICINE

## 2022-05-24 PROCEDURE — 1159F PR MEDICATION LIST DOCUMENTED IN MEDICAL RECORD: ICD-10-PCS | Mod: CPTII,S$GLB,, | Performed by: INTERNAL MEDICINE

## 2022-05-24 PROCEDURE — 99215 PR OFFICE/OUTPT VISIT, EST, LEVL V, 40-54 MIN: ICD-10-PCS | Mod: S$GLB,,, | Performed by: INTERNAL MEDICINE

## 2022-05-24 NOTE — PROGRESS NOTES
Subjective:   Date of Visit: 5/24/22   ?   ?    REFERRING PROVIDER: No referring provider defined for this encounter.   ?   CHIEF COMPLAINT:  Pancreatic cancer???????   ?   ONCOLOGIC DIAGNOSIS:  Pancreatic adenocarcinoma  ?   CURRENT TREATMENT:  None    PAST TREATMENT:  None  ?   ONCOLOGIC HISTORY:     Ultrasound abdomen 04/05/2022:    Impression:     3.6 cm solid mass seen at the level the head of the pancreas with significant ductal dilatation of the pancreatic duct measuring up to 11 mm.   Moderate intrahepatic and common bile duct dilatation. 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  Recommend ERCP with endoscopic ultrasound and tissue sampling.     1. Bile duct stricture, biopsy:   - Minute fragment of benign bile duct epithelium,  negative  for dysplasia or   malignancy   2. Common bile duct brushing (1 thin prep):   -  Positive  for malignancy (see comment)        HPI:  66-year-old male with history of CHF, PAD was referred to us due to recent finding of positive malignant cells in the common bile duct brushing.  Apparently he was recently seen in the emergency room due to painless jaundice.  Ultrasound obtained at that time revealed a 3.6 cm solid mass at the head of pancreas.    Common bile duct brushings returned positive for malignancy. EUS unable to be performed at the time due to complication with scope per GI.  Given positive malignant cells, he was advised to follow with Oncology for further management.    He presents today to discuss management. No new complaints      Review of Systems   Constitutional: Positive for fatigue. Negative for activity change, appetite change, chills, fever and unexpected weight change.   HENT: Negative for hearing loss, mouth sores, nosebleeds, sore throat, tinnitus, trouble swallowing and voice change.    Eyes: Negative for visual disturbance.   Respiratory: Negative for cough, chest tightness, shortness of breath  and wheezing.    Cardiovascular: Negative for chest pain, palpitations and leg swelling.   Gastrointestinal: Negative for abdominal pain, anal bleeding, blood in stool, constipation, diarrhea and vomiting.   Genitourinary: Negative for frequency, hematuria and testicular pain.   Musculoskeletal: Negative for arthralgias, back pain, gait problem and neck pain.   Skin: Negative for color change, pallor, rash and wound.   Allergic/Immunologic: Negative for immunocompromised state.   Neurological: Positive for weakness. Negative for seizures, syncope, numbness and headaches.   Hematological: Negative for adenopathy. Does not bruise/bleed easily.   Psychiatric/Behavioral: Negative for agitation, confusion, decreased concentration, hallucinations and sleep disturbance. The patient is not nervous/anxious.        ?   PAST MEDICAL HISTORY:   Past Medical History:   Diagnosis Date    Arthritis     Cardiomyopathy     Ischemic    CHF (congestive heart failure)     Coronary artery disease     Dyslipidemia     Hyperlipidemia     Hypertension     Mitral insufficiency     Myocardial infarction     Peripheral arterial disease     Pneumonia 12/2021    Pulmonary hypertension     ?     PAST SURGICAL HISTORY:   Past Surgical History:   Procedure Laterality Date    CORONARY ARTERY BYPASS GRAFT      [place] to LAD, saphenous vein graft to right PDA    ENDOSCOPIC ULTRASOUND OF UPPER GASTROINTESTINAL TRACT N/A 4/8/2022    Procedure: ULTRASOUND, UPPER GI TRACT, ENDOSCOPIC;  Surgeon: Merritt Darby MD;  Location: [facility];  Service: Endoscopy;  Laterality: N/A;    ERCP N/A 4/8/2022    Procedure: ERCP (ENDOSCOPIC RETROGRADE CHOLANGIOPANCREATOGRAPHY);  Surgeon: Merritt Darby MD;  Location: [facility];  Service: Endoscopy;  Laterality: N/A;    INSERTION OF IMPLANTABLE CARDIOVERTER-DEFIBRILLATOR (ICD) GENERATOR WITH TWO EXISTING LEADS        ?   ALLERGIES:   Allergies as of 05/24/2022    (No Known Allergies)       ?   MEDICATIONS:?   Outpatient Medications Marked as Taking for the 22 encounter (Office Visit) with Hardeep Cole MD   Medication Sig Dispense Refill    amoxicillin-clavulanate 875-125mg (AUGMENTIN) 875-125 mg per tablet Take 1 tablet by mouth every 12 (twelve) hours. 10 tablet 0    apixaban (ELIQUIS) 5 mg Tab Take 5 mg by mouth once daily.      aspirin 81 MG Chew Take 81 mg by mouth once daily.      b complex vitamins capsule Take 1 capsule by mouth once daily.      bumetanide (BUMEX) 1 MG tablet Take 1 mg by mouth once daily.      cholecalciferol, vitamin D3, (VITAMIN D3) 25 mcg (1,000 unit) capsule Take 1,000 Units by mouth once daily.      cyproheptadine (PERIACTIN) 4 mg tablet Take 4 mg by mouth 3 (three) times daily with meals.      DOBUTamine (DOBUTREX) 1,000 mg/250 mL (4,000 mcg/mL) infusion Inject 227 mcg/min into the vein continuous.      ENTRESTO 49-51 mg per tablet Take 49-51 tablets by mouth 2 (two) times a day.      omega-3/dha/epa/ala/vitamin D3 (OMEGA-3-DHA-EPA-ALA-VIT D3 ORAL) Take 2 capsules by mouth once daily at 6am.      potassium chloride SA (K-DUR,KLOR-CON) 20 MEQ tablet Take 20 mEq by mouth once daily.      spironolactone (ALDACTONE) 25 MG tablet Take 25 mg by mouth once daily.        ?   SOCIAL HISTORY:?   Social History     Tobacco Use    Smoking status: Former Smoker     Types: Cigarettes     Quit date:      Years since quittin.4    Smokeless tobacco: Never Used   Substance Use Topics    Alcohol use: Not Currently        ?   FAMILY HISTORY:   family history is not on file.   ?     Objective:      Physical Exam  Constitutional:       General: He is not in acute distress.     Appearance: He is well-developed.   HENT:      Head: Normocephalic and atraumatic.      Right Ear: External ear normal.      Left Ear: External ear normal.      Mouth/Throat:      Pharynx: No oropharyngeal exudate.   Eyes:      General: No scleral icterus.        Right eye: No  discharge.         Left eye: No discharge.      Conjunctiva/sclera: Conjunctivae normal.      Pupils: Pupils are equal, round, and reactive to light.   Neck:      Thyroid: No thyromegaly.   Cardiovascular:      Rate and Rhythm: Normal rate and regular rhythm.      Heart sounds: Normal heart sounds. No murmur heard.  Pulmonary:      Effort: Pulmonary effort is normal. No respiratory distress.      Breath sounds: Normal breath sounds. No wheezing.   Chest:      Chest wall: No tenderness.   Breasts:      Right: No supraclavicular adenopathy.      Left: No supraclavicular adenopathy.       Abdominal:      General: Bowel sounds are normal. There is no distension.      Palpations: Abdomen is soft. There is no mass.      Tenderness: There is no abdominal tenderness. There is no rebound.   Musculoskeletal:         General: No tenderness. Normal range of motion.      Cervical back: Normal range of motion and neck supple.   Lymphadenopathy:      Cervical: No cervical adenopathy.      Right cervical: No superficial cervical adenopathy.     Left cervical: No superficial cervical adenopathy.      Upper Body:      Right upper body: No supraclavicular or pectoral adenopathy.      Left upper body: No supraclavicular or pectoral adenopathy.   Skin:     General: Skin is warm and dry.      Capillary Refill: Capillary refill takes 2 to 3 seconds.      Coloration: Skin is not pale.      Findings: No erythema or rash.   Neurological:      Mental Status: He is alert and oriented to person, place, and time.      Cranial Nerves: No cranial nerve deficit.      Sensory: No sensory deficit.   Psychiatric:         Behavior: Behavior normal.         Judgment: Judgment normal.       ?   Vitals:    05/24/22 1032   BP: 94/61   Pulse: 104   Resp: 20   Temp: 98.2 °F (36.8 °C)      ?     ECOG SCORE    2 - Capable of all selfcare but unable to carry out any work activities, active > 50% of hours       ?   Laboratory:  ?   No visits with results within  1 Day(s) from this visit.   Latest known visit with results is:   Hospital Outpatient Visit on 05/23/2022   Component Date Value Ref Range Status    BSA 05/23/2022 2.16  m2 Final    TDI SEPTAL 05/23/2022 0.05  m/s Final    LV LATERAL E/E' RATIO 05/23/2022 20.67  m/s Final    LV SEPTAL E/E' RATIO 05/23/2022 24.80  m/s Final    LA WIDTH 05/23/2022 4.70  cm Final    Left Ventricular Outflow Tract [place]* 05/23/2022 [id number]  cm/s Final    Left Ventricular Outflow Tract [place]* 05/23/2022 0.80  mmHg Final    TDI LATERAL 05/23/2022 0.06  m/s Final    PV PEAK VELOCITY 05/23/2022 0.71  cm/s Final    LVIDd 05/23/2022 6.40 (A) 3.5 - 6.0 cm Final    IVS 05/23/2022 1.24 (A) 0.6 - 1.1 cm Final    Posterior Wall 05/23/2022 1.20 (A) 0.6 - 1.1 cm Final    Ao root annulus 05/23/2022 3.22  cm Final    LVIDs 05/23/2022 6.06 (A) 2.1 - 4.0 cm Final    FS 05/23/2022 5  28 - 44 % Final    LA volume 05/23/2022 101.87  cm3 Final    Sinus 05/23/2022 2.81  cm Final    STJ 05/23/2022 2.41  cm Final    Ascending aorta 05/23/2022 3.14  cm Final    LV mass 05/23/2022 357.25  g Final    LA size 05/23/2022 4.25  cm Final    RVDD 05/23/2022 4.59  cm Final    Left Ventricle Relative Wall Thick* 05/23/2022 0.38  cm Final    AV mean gradient 05/23/2022 8  mmHg Final    AV valve area 05/23/2022 1.09  cm2 Final    AV Velocity Ratio 05/23/2022 0.32   Final    AV index (prosthetic) 05/23/2022 0.32   Final    MV valve area p 1/2 method 05/23/2022 5.72  cm2 Final    E/A ratio 05/23/2022 3.88   Final    Mean e' 05/23/2022 0.06  m/s Final    E wave deceleration time 05/23/2022 [id number]  msec Final    IVRT 05/23/2022 [id number]  msec Final    LVOT diameter 05/23/2022 2.08  cm Final    LVOT area 05/23/2022 3.4  cm2 Final    LVOT peak tre 05/23/2022 0.60  m/s Final    LVOT peak VTI 05/23/2022 9.90  cm Final    Ao peak ter 05/23/2022 1.86  m/s Final    Ao VTI 05/23/2022 30.8  cm Final    RVOT peak tre  05/23/2022 0.53  m/s Final    RVOT peak VTI 05/23/2022 7.7  cm Final    LVOT stroke volume 05/23/2022 33.62  cm3 Final    AV peak gradient 05/23/2022 14  mmHg Final    PV mean gradient 05/23/2022 0.56  mmHg Final    E/E' ratio 05/23/2022 22.55  m/s Final    MV Peak E [place] 05/23/2022 1.24  m/s Final    TR Max [place] 05/23/2022 3.68  m/s Final    MV stenosis pressure 1/2 time 05/23/2022 [id number]  ms Final    MV Peak A [place] 05/23/2022 0.32  m/s Final    LV Systolic Volume 05/23/2022 183.91  mL Final    LV Systolic Volume Index 05/23/2022 85.1  mL/m2 Final    LV Diastolic Volume 05/23/2022 208.25  mL Final    LV Diastolic Volume Index 05/23/2022 96.41  mL/m2 Final    LA Volume Index 05/23/2022 47.2  mL/m2 Final    LV Mass Index 05/23/2022 165  g/m2 Final    RA Major Axis 05/23/2022 5.39  cm Final    Left Atrium Minor Axis 05/23/2022 5.98  cm Final    Left Atrium Major Axis 05/23/2022 6.02  cm Final    Triscuspid Valve Regurgitation Pea* 05/23/2022 54  mmHg Final    LA Volume Index (Mod) 05/23/2022 45.9  mL/m2 Final    LA volume (mod) 05/23/2022 99.20  cm3 Final    RA Width 05/23/2022 4.82  cm Final    Right Atrial Pressure (from IVC) 05/23/2022 3  mmHg Final    EF 05/23/2022 15  % Final    TV rest pulmonary artery pressure 05/23/2022 57  mmHg Final      ?   Tumor markers   ?   ?   Imaging: Echo  · The left ventricle is moderately enlarged with eccentric hypertrophy and   severely decreased systolic function.  · Moderate left atrial enlargement.  · The estimated ejection fraction is 15%.  · The left ventricular global longitudinal strain is -5.4%.  · Grade III left ventricular diastolic dysfunction.  · The estimated PA systolic pressure is 57 mmHg.  · Normal central venous pressure (3 mmHg).  · Mild right ventricular enlargement with moderately reduced right   ventricular systolic function.  · There is pulmonary hypertension.  · Moderate mitral regurgitation.  · Moderate tricuspid  regurgitation.  · There is mild aortic valve stenosis.        ?      Pathology:  Pathology Results  (Last 10 years)    None           ?   Assessment/Plan:       1. Pancreatic mass          Pancreatic mass  Probable pancreatic cancer.  Noted positive malignant cells from common bile duct brushing.  Noted normal serum tumor marker CA 19 9. Reviewed ultrasound results with family and patient and answered their questions.     Staging CT scan from 05/06/2022 showed a 5.3 cm hypoenhancing lesion in the inferior right liver suspicious for metastatic disease, additional 1.6 cm lesion at the inferior right liver.  Also reported and ill-defined hypoenhancing 2.8 cm pancreatic head mass and 2.5 x 1.4 cm aortocaval retroperitoneal lymph node.     Advised patient that based on the above radiographic study, it appears he has and unresectable pancreatic cancer.       Discussed case at tumor board, based on risk of anesthesia, the board recommended against tissue biopsy. Have ordered germline testing, awaiting results.     He understands that his disease is incurable but can be treated.      Regimen:  Gemcitabine plus Abraxane     Most recent 2D-ECHO showed ejection fraction of 15%. Repeated ECHO showed 15% EF.      Patient still indecisive as to whether to proceed with chemotherapy.  He understands that there is risk of worsening cardiac function.     He will contact us before the end of the week with his decision.       ?Pancreatic mass  -     CBC Auto Differential; Future; Expected date: 05/24/2022  -     Comprehensive Metabolic Panel; Future; Expected date: 05/24/2022    ?   Follow-Up: Follow up in about 1 week (around 5/31/2022).    JOSH FOUNTAIN Md., Ph.D  Hematology & Oncology Department  Phone #: [phone]

## 2022-05-30 NOTE — PROGRESS NOTES
Subjective:   Patient ID:  Avery Earl is a 66 y.o. male who presents for follow-up of No chief complaint on file.  Pt is a 67 yo male with pMHx of CABG, ischemic cardiomyopathy on continuous dobutamine infusion at home, DVT, PAD, HPL, severe mitral insuffiencey and pulmonary hypertension. He presents today due to dark, tea colored urine onset approx 2 weeks and scleral jaundice for approx 1.5 weeks. He endorses intermittent generalized abdominal pain and back pain. Within the last 3 months, pt has lost approx 30 pounds as he had all his teeth extracted due to possible cardiac procedure. He describes KIMBALL and bilateral lower leg swelling.   In the ED, temp 98, pulse 92, resp 14, B/P 86/55 and SpO2 100 %  Labs find H/H 9.5/25.6, Na 132, potassium 3.4, gluc 135, alk phos 977, total bilirubin 18.7 and AST 90 and ALT 62  Urine is tea colored and analysis reflects multiple abnormalities.  CXR - no acute cardiopulmonary findings  Abdominal US - 3.6 cm solid mass seen at the level the head of the pancreas with significant ductal dilatation of the pancreatic duct measuring up to 11 mm.   Moderate intrahepatic and common bile duct dilatation. 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  Recommend ERCP with endoscopic ultrasound and tissue sampling.  ED spoke with Dr. Miller - hold further anticoagulation - full consult pending by GI.   Cardiology to provide opinion regarding cardiac clearance given ischemic cardiomyopathy prior to ERCP.  Patient presents to the office after follow-up in the hospital for jaundice and evidence of ERCP necessary for stricture of the common bile duct.  Patient has pathology that is suspicious for malignancy will be discussed with the patient at his next GI visit.  Patient is otherwise feeling well back on all medications for heart failure and stable no exertional symptoms of chest pain or shortness of breath and no edema today patient doing  well this time.       This visit finds patient feeling generally well.  He is weak he is tired.  Patient also has seen the oncologist and he has an unresectable pancreatic cancer.  He does not want take chemotherapy due to risk of heart failure.  Overall LV function is 15% or last by cardiac echo and unchanged on chronic medications.  He is not short of breath he has no edema feels generally well and like to continue this way on chronic cardiac medications.      Review of Systems   Constitutional: Negative for chills, diaphoresis, night sweats, weight gain and weight loss.   HENT: Negative for congestion, hoarse voice, sore throat and stridor.    Eyes: Negative for double vision and pain.   Cardiovascular: Negative for chest pain, claudication, cyanosis, dyspnea on exertion, irregular heartbeat, leg swelling, near-syncope, orthopnea, palpitations, paroxysmal nocturnal dyspnea and syncope.   Respiratory: Negative for cough, hemoptysis, shortness of breath, sleep disturbances due to breathing, snoring, sputum production and wheezing.    Endocrine: Negative for cold intolerance, heat intolerance and polydipsia.   Hematologic/Lymphatic: Negative for bleeding problem. Does not bruise/bleed easily.   Skin: Negative for color change, dry skin and rash.   Musculoskeletal: Negative for joint swelling and muscle cramps.   Gastrointestinal: Negative for bloating, abdominal pain, constipation, diarrhea, dysphagia, melena, nausea and vomiting.   Genitourinary: Negative for flank pain and urgency.   Neurological: Negative for dizziness, focal weakness, headaches, light-headedness, loss of balance, seizures and weakness.   Psychiatric/Behavioral: Negative for altered mental status and memory loss. The patient is not nervous/anxious.      No family history on file.  Past Medical History:   Diagnosis Date    Arthritis     Cardiomyopathy     Ischemic    CHF (congestive heart failure)     Coronary artery disease     Dyslipidemia      Hyperlipidemia     Hypertension     Mitral insufficiency     Myocardial infarction     Peripheral arterial disease     Pneumonia 2021    Pulmonary hypertension      Social History     Socioeconomic History    Marital status: Single   Tobacco Use    Smoking status: Former Smoker     Types: Cigarettes     Quit date:      Years since quittin.4    Smokeless tobacco: Never Used   Substance and Sexual Activity    Alcohol use: Not Currently    Drug use: Never     Current Outpatient Medications on File Prior to Visit   Medication Sig Dispense Refill    amoxicillin-clavulanate 875-125mg (AUGMENTIN) 875-125 mg per tablet Take 1 tablet by mouth every 12 (twelve) hours. 10 tablet 0    apixaban (ELIQUIS) 5 mg Tab Take 5 mg by mouth once daily.      aspirin 81 MG Chew Take 81 mg by mouth once daily.      b complex vitamins capsule Take 1 capsule by mouth once daily.      bumetanide (BUMEX) 1 MG tablet Take 1 mg by mouth once daily.      cholecalciferol, vitamin D3, (VITAMIN D3) 25 mcg (1,000 unit) capsule Take 1,000 Units by mouth once daily.      cyproheptadine (PERIACTIN) 4 mg tablet Take 4 mg by mouth 3 (three) times daily with meals.      DOBUTamine (DOBUTREX) 1,000 mg/250 mL (4,000 mcg/mL) infusion Inject 227 mcg/min into the vein continuous.      ENTRESTO 49-51 mg per tablet Take 49-51 tablets by mouth 2 (two) times a day.      omega-3/dha/epa/ala/vitamin D3 (OMEGA-3-DHA-EPA-ALA-VIT D3 ORAL) Take 2 capsules by mouth once daily at 6am.      potassium chloride SA (K-DUR,KLOR-CON) 20 MEQ tablet Take 20 mEq by mouth once daily.      spironolactone (ALDACTONE) 25 MG tablet Take 25 mg by mouth once daily.       No current facility-administered medications on file prior to visit.     Review of patient's allergies indicates:  No Known Allergies    Objective:     Physical Exam  Eyes:      Pupils: Pupils are equal, round, and reactive to light.   Neck:      Trachea: No tracheal deviation.    Cardiovascular:      Rate and Rhythm: Normal rate and regular rhythm.      Pulses: Intact distal pulses.           Carotid pulses are 2+ on the right side and 2+ on the left side.       Radial pulses are 2+ on the right side and 2+ on the left side.        Femoral pulses are 2+ on the right side and 2+ on the left side.       Popliteal pulses are 2+ on the right side and 2+ on the left side.        Dorsalis pedis pulses are 2+ on the right side and 2+ on the left side.        Posterior tibial pulses are 2+ on the right side and 2+ on the left side.      Heart sounds: Normal heart sounds. No murmur heard.    No friction rub. No gallop.   Pulmonary:      Effort: Pulmonary effort is normal. No respiratory distress.      Breath sounds: Normal breath sounds. No stridor. No wheezing or rales.   Chest:      Chest wall: No tenderness.   Abdominal:      General: There is no distension.      Tenderness: There is no abdominal tenderness. There is no rebound.   Musculoskeletal:         General: No tenderness.      Cervical back: Normal range of motion.   Skin:     General: Skin is warm and dry.   Neurological:      Mental Status: He is alert and oriented to person, place, and time.     Cardiac echo 05/17/2022:  · The left ventricle is moderately enlarged with eccentric hypertrophy and severely decreased systolic function.  · Moderate left atrial enlargement.  · The estimated ejection fraction is 15%.  · The left ventricular global longitudinal strain is -5.4%.  · Grade III left ventricular diastolic dysfunction.  · The estimated PA systolic pressure is 57 mmHg.  · Normal central venous pressure (3 mmHg).  · Mild right ventricular enlargement with moderately reduced right ventricular systolic function.  · There is pulmonary hypertension.  · Moderate mitral regurgitation.  · Moderate tricuspid regurgitation.  · There is mild aortic valve stenosis.         Assessment:     1. Chronic combined systolic and diastolic congestive  heart failure    2. Nonrheumatic mitral valve regurgitation    3. Hyperlipidemia LDL goal <70    4. Claudication in peripheral vascular disease    5. Coronary artery disease involving native coronary artery of native heart without angina pectoris    6. Ischemic cardiomyopathy        Plan:     Chronic combined systolic and diastolic congestive heart failure    Nonrheumatic mitral valve regurgitation    Hyperlipidemia LDL goal <70    Claudication in peripheral vascular disease    Coronary artery disease involving native coronary artery of native heart without angina pectoris    Ischemic cardiomyopathy      Impression 1 ischemic cardiomyopathy:  Poor LV function LVEF 15% global hypokinesia of left ventricle patient continues on high-dose medications including Entresto, beta-blockers and spironolactone.  Patient also takes Bumex once daily.  For more significant edema will take double that dose.  His weight has been stable.  2. CAD stable no chest pain  3. claudication stable    4 pancreatic cancer patient continues on home monitoring and follow-up Oncology.  Follow-up evaluation 3 months review sooner if there are acute, or recurrence symptoms.  He will call if he has more edema.

## 2022-05-31 ENCOUNTER — OFFICE VISIT (OUTPATIENT)
Dept: TRANSPLANT | Facility: CLINIC | Age: 67
End: 2022-05-31
Payer: MEDICARE

## 2022-05-31 VITALS
SYSTOLIC BLOOD PRESSURE: 102 MMHG | HEART RATE: 96 BPM | WEIGHT: 192.88 LBS | OXYGEN SATURATION: 100 % | BODY MASS INDEX: 24.77 KG/M2 | DIASTOLIC BLOOD PRESSURE: 60 MMHG

## 2022-05-31 DIAGNOSIS — I25.10 CORONARY ARTERY DISEASE INVOLVING NATIVE CORONARY ARTERY OF NATIVE HEART WITHOUT ANGINA PECTORIS: ICD-10-CM

## 2022-05-31 DIAGNOSIS — I50.42 CHRONIC COMBINED SYSTOLIC AND DIASTOLIC CONGESTIVE HEART FAILURE: Primary | ICD-10-CM

## 2022-05-31 DIAGNOSIS — E78.5 HYPERLIPIDEMIA LDL GOAL <70: ICD-10-CM

## 2022-05-31 DIAGNOSIS — I73.9 CLAUDICATION IN PERIPHERAL VASCULAR DISEASE: ICD-10-CM

## 2022-05-31 DIAGNOSIS — I25.5 ISCHEMIC CARDIOMYOPATHY: ICD-10-CM

## 2022-05-31 DIAGNOSIS — I34.0 NONRHEUMATIC MITRAL VALVE REGURGITATION: ICD-10-CM

## 2022-05-31 PROCEDURE — 1157F PR ADVANCE CARE PLAN OR EQUIV PRESENT IN MEDICAL RECORD: ICD-10-PCS | Mod: CPTII,S$GLB,, | Performed by: INTERNAL MEDICINE

## 2022-05-31 PROCEDURE — 99214 PR OFFICE/OUTPT VISIT, EST, LEVL IV, 30-39 MIN: ICD-10-PCS | Mod: S$GLB,,, | Performed by: INTERNAL MEDICINE

## 2022-05-31 PROCEDURE — 3008F BODY MASS INDEX DOCD: CPT | Mod: CPTII,S$GLB,, | Performed by: INTERNAL MEDICINE

## 2022-05-31 PROCEDURE — 1159F PR MEDICATION LIST DOCUMENTED IN MEDICAL RECORD: ICD-10-PCS | Mod: CPTII,S$GLB,, | Performed by: INTERNAL MEDICINE

## 2022-05-31 PROCEDURE — 1157F ADVNC CARE PLAN IN RCRD: CPT | Mod: CPTII,S$GLB,, | Performed by: INTERNAL MEDICINE

## 2022-05-31 PROCEDURE — 3074F PR MOST RECENT SYSTOLIC BLOOD PRESSURE < 130 MM HG: ICD-10-PCS | Mod: CPTII,S$GLB,, | Performed by: INTERNAL MEDICINE

## 2022-05-31 PROCEDURE — 3078F PR MOST RECENT DIASTOLIC BLOOD PRESSURE < 80 MM HG: ICD-10-PCS | Mod: CPTII,S$GLB,, | Performed by: INTERNAL MEDICINE

## 2022-05-31 PROCEDURE — 3008F PR BODY MASS INDEX (BMI) DOCUMENTED: ICD-10-PCS | Mod: CPTII,S$GLB,, | Performed by: INTERNAL MEDICINE

## 2022-05-31 PROCEDURE — 3074F SYST BP LT 130 MM HG: CPT | Mod: CPTII,S$GLB,, | Performed by: INTERNAL MEDICINE

## 2022-05-31 PROCEDURE — 99214 OFFICE O/P EST MOD 30 MIN: CPT | Mod: S$GLB,,, | Performed by: INTERNAL MEDICINE

## 2022-05-31 PROCEDURE — 99999 PR PBB SHADOW E&M-EST. PATIENT-LVL III: ICD-10-PCS | Mod: PBBFAC,,, | Performed by: INTERNAL MEDICINE

## 2022-05-31 PROCEDURE — 1160F PR REVIEW ALL MEDS BY PRESCRIBER/CLIN PHARMACIST DOCUMENTED: ICD-10-PCS | Mod: CPTII,S$GLB,, | Performed by: INTERNAL MEDICINE

## 2022-05-31 PROCEDURE — 3078F DIAST BP <80 MM HG: CPT | Mod: CPTII,S$GLB,, | Performed by: INTERNAL MEDICINE

## 2022-05-31 PROCEDURE — 1159F MED LIST DOCD IN RCRD: CPT | Mod: CPTII,S$GLB,, | Performed by: INTERNAL MEDICINE

## 2022-05-31 PROCEDURE — 1160F RVW MEDS BY RX/DR IN RCRD: CPT | Mod: CPTII,S$GLB,, | Performed by: INTERNAL MEDICINE

## 2022-05-31 PROCEDURE — 99999 PR PBB SHADOW E&M-EST. PATIENT-LVL III: CPT | Mod: PBBFAC,,, | Performed by: INTERNAL MEDICINE

## 2022-05-31 NOTE — LETTER
May 31, 2022        Rico Barnhart  [address] [place] [address] [address]  Phone: [phone]  Fax: [phone]             [facility]  [address] [place] [address]  Phone: [phone]  Fax: [phone]   Patient: Avery Earl   MR Number: [id number]   YOB: 1955   Date of Visit: 5/31/2022       Dear Dr. Rico Barnhart    Thank you for referring Avery Earl to me for evaluation. Attached you will find relevant portions of my assessment and plan of care.    If you have questions, please do not hesitate to call me. I look forward to following Avery Earl along with you.    Sincerely,    Javi Turk MD    Enclosure    If you would like to receive this communication electronically, please contact [email or web address] or [phone] to request [employer] access.    [employer] is a tool which provides read-only access to select patient information with whom you have a relationship. Its easy to use and provides real time access to review your patients record including encounter summaries, notes, results, and demographic information.    If you feel you have received this communication in error or would no longer like to receive these types of communications, please e-mail [email or web address]

## 2022-06-11 PROCEDURE — G0179 PR HOME HEALTH MD RECERTIFICATION: ICD-10-PCS | Mod: ,,, | Performed by: INTERNAL MEDICINE

## 2022-06-11 PROCEDURE — G0179 MD RECERTIFICATION HHA PT: HCPCS | Mod: ,,, | Performed by: INTERNAL MEDICINE

## 2022-06-17 ENCOUNTER — DOCUMENT SCAN (OUTPATIENT)
Dept: HOME HEALTH SERVICES | Facility: HOSPITAL | Age: 67
End: 2022-06-17
Payer: MEDICARE

## 2022-06-28 ENCOUNTER — TELEPHONE (OUTPATIENT)
Dept: CARDIOLOGY | Facility: CLINIC | Age: 67
End: 2022-06-28
Payer: MEDICARE

## 2022-06-28 NOTE — TELEPHONE ENCOUNTER
----- Message from Javi Turk MD sent at 6/28/2022  2:43 PM CDT -----  Regarding: RE: BP  Contact: [facility]  Ok to follow, blood pressure tomorrow  ----- Message -----  From: Diamond Carroll MA  Sent: 6/28/2022   2:29 PM CDT  To: Javi Turk MD  Subject: FW: BP                                           Just FYI, please advise:      ----- Message -----  From: Robyn Brennan  Sent: 6/28/2022   2:09 PM CDT  To: [facility] Staff  Subject: BP                                               Ms Burnett states that patients BP was 80/54 and has been been running low, patient has no complaints, has been moving around and wanted to let provider be aware of it, please call her back if needed at [phone]

## 2022-06-28 NOTE — TELEPHONE ENCOUNTER
Spoke to  nurse and informed her that Dr Turk would like her to update us tomorrow on his BP,  nurse verbalized understanding.

## 2022-06-29 ENCOUNTER — EXTERNAL HOME HEALTH (OUTPATIENT)
Dept: HOME HEALTH SERVICES | Facility: HOSPITAL | Age: 67
End: 2022-06-29
Payer: MEDICARE

## 2022-07-13 ENCOUNTER — IMMUNIZATION (OUTPATIENT)
Dept: PHARMACY | Facility: CLINIC | Age: 67
End: 2022-07-13
Payer: MEDICARE

## 2022-07-13 DIAGNOSIS — Z23 NEED FOR VACCINATION: Primary | ICD-10-CM

## 2022-07-19 ENCOUNTER — TELEPHONE (OUTPATIENT)
Dept: CARDIOLOGY | Facility: HOSPITAL | Age: 67
End: 2022-07-19
Payer: MEDICARE

## 2022-07-19 NOTE — TELEPHONE ENCOUNTER
"ICD Report  Last Remote Transmission 5/17/22  RV amplitude,  4.9 mV. Sensitivity programmed 0.30 mV. Consistent with trends.  15 VT-NS events, longest on 6/25/22 at 7:54pm, 9.4 sec duration, average V rate 190 bpm. EGM #154 on 6/25/22 occurred at the same time suggesting same event. Available EGMs illustrate nsVT. PVCs noted.  Optivol index shows increase above threshold of 80, correlating with TI  decrease since 5/17/22, ongoing. Trend shows Optivol index started to decrease towards baseline since 6/27/22.  Patient activity level illustrates decrease from approximately 1 hr/day to 30min/day.   TI??suggestive of possible decompensation escalated..."    TI and optivol both appear to be trending back to baseline.  nurse with pt and reports swelling in legs/feet is still present but has improved from the last few weeks. Nurse reports he has lost weight from not eating (pt recently had his teeth removed for dentures and they don't fit correctly- so it is hard for him to eat) and is currently around 160lbs. RN reports his color is "not good and he is pale." Pt reports SOB x 2days with activity. Last appt with Dr. Infante was in April. pt has upcoming appt with Dr. Turk on 8/31/22. Dr. Infante and Dr. Turk aware and will f/u with pt as needed.   "

## 2022-07-27 ENCOUNTER — HOSPITAL ENCOUNTER (EMERGENCY)
Facility: HOSPITAL | Age: 67
Discharge: HOME | End: 2022-07-27
Attending: EMERGENCY MEDICINE
Payer: MEDICARE

## 2022-07-27 VITALS
BODY MASS INDEX: 21.82 KG/M2 | RESPIRATION RATE: 20 BRPM | TEMPERATURE: 99 F | SYSTOLIC BLOOD PRESSURE: 121 MMHG | WEIGHT: 170 LBS | DIASTOLIC BLOOD PRESSURE: 61 MMHG | OXYGEN SATURATION: 100 % | HEART RATE: 96 BPM

## 2022-07-27 DIAGNOSIS — I50.9 CHRONIC HEART FAILURE, UNSPECIFIED HEART FAILURE TYPE: ICD-10-CM

## 2022-07-27 DIAGNOSIS — R10.13 EPIGASTRIC PAIN: Primary | ICD-10-CM

## 2022-07-27 DIAGNOSIS — R06.02 SHORTNESS OF BREATH: ICD-10-CM

## 2022-07-27 DIAGNOSIS — D64.9 CHRONIC ANEMIA: ICD-10-CM

## 2022-07-27 DIAGNOSIS — Z85.07 HISTORY OF PANCREATIC CANCER: ICD-10-CM

## 2022-07-27 LAB
ABO + RH BLD: NORMAL
ALBUMIN SERPL BCP-MCNC: 2.4 G/DL (ref 3.5–5.2)
ALP SERPL-CCNC: 197 U/L (ref 55–135)
ALT SERPL W/O P-5'-P-CCNC: 8 U/L (ref 10–44)
ANION GAP SERPL CALC-SCNC: 11 MMOL/L (ref 8–16)
AST SERPL-CCNC: 15 U/L (ref 10–40)
BASOPHILS # BLD AUTO: 0.02 K/UL (ref 0–0.2)
BASOPHILS NFR BLD: 0.2 % (ref 0–1.9)
BILIRUB SERPL-MCNC: 0.7 MG/DL (ref 0.1–1)
BLD GP AB SCN CELLS X3 SERPL QL: NORMAL
BUN SERPL-MCNC: 9 MG/DL (ref 8–23)
CALCIUM SERPL-MCNC: 10.9 MG/DL (ref 8.7–10.5)
CHLORIDE SERPL-SCNC: 104 MMOL/L (ref 95–110)
CO2 SERPL-SCNC: 22 MMOL/L (ref 23–29)
CREAT SERPL-MCNC: 0.6 MG/DL (ref 0.5–1.4)
DIFFERENTIAL METHOD: ABNORMAL
EOSINOPHIL # BLD AUTO: 0 K/UL (ref 0–0.5)
EOSINOPHIL NFR BLD: 0.5 % (ref 0–8)
ERYTHROCYTE [DISTWIDTH] IN BLOOD BY AUTOMATED COUNT: 13 % (ref 11.5–14.5)
EST. GFR  (AFRICAN AMERICAN): >60 ML/MIN/1.73 M^2
EST. GFR  (NON AFRICAN AMERICAN): >60 ML/MIN/1.73 M^2
GLUCOSE SERPL-MCNC: 77 MG/DL (ref 70–110)
HCT VFR BLD AUTO: 26.7 % (ref 40–54)
HGB BLD-MCNC: 8.8 G/DL (ref 14–18)
IMM GRANULOCYTES # BLD AUTO: 0.02 K/UL (ref 0–0.04)
IMM GRANULOCYTES NFR BLD AUTO: 0.2 % (ref 0–0.5)
LIPASE SERPL-CCNC: 3 U/L (ref 4–60)
LYMPHOCYTES # BLD AUTO: 1.5 K/UL (ref 1–4.8)
LYMPHOCYTES NFR BLD: 18.3 % (ref 18–48)
MCH RBC QN AUTO: 27.4 PG (ref 27–31)
MCHC RBC AUTO-ENTMCNC: 33 G/DL (ref 32–36)
MCV RBC AUTO: 83 FL (ref 82–98)
MONOCYTES # BLD AUTO: 0.8 K/UL (ref 0.3–1)
MONOCYTES NFR BLD: 9.6 % (ref 4–15)
NEUTROPHILS # BLD AUTO: 5.9 K/UL (ref 1.8–7.7)
NEUTROPHILS NFR BLD: 71.2 % (ref 38–73)
NRBC BLD-RTO: 0 /100 WBC
PLATELET # BLD AUTO: 393 K/UL (ref 150–450)
PMV BLD AUTO: 8.7 FL (ref 9.2–12.9)
POTASSIUM SERPL-SCNC: 3.7 MMOL/L (ref 3.5–5.1)
PROT SERPL-MCNC: 6.7 G/DL (ref 6–8.4)
RBC # BLD AUTO: 3.21 M/UL (ref 4.6–6.2)
SODIUM SERPL-SCNC: 137 MMOL/L (ref 136–145)
WBC # BLD AUTO: 8.34 K/UL (ref 3.9–12.7)

## 2022-07-27 PROCEDURE — 93005 ELECTROCARDIOGRAM TRACING: CPT

## 2022-07-27 PROCEDURE — 36415 COLL VENOUS BLD VENIPUNCTURE: CPT | Performed by: EMERGENCY MEDICINE

## 2022-07-27 PROCEDURE — 80053 COMPREHEN METABOLIC PANEL: CPT | Performed by: NURSE PRACTITIONER

## 2022-07-27 PROCEDURE — 86901 BLOOD TYPING SEROLOGIC RH(D): CPT | Performed by: EMERGENCY MEDICINE

## 2022-07-27 PROCEDURE — 85025 COMPLETE CBC W/AUTO DIFF WBC: CPT | Performed by: NURSE PRACTITIONER

## 2022-07-27 PROCEDURE — 99285 EMERGENCY DEPT VISIT HI MDM: CPT | Mod: 25

## 2022-07-27 PROCEDURE — 93010 ELECTROCARDIOGRAM REPORT: CPT | Mod: ,,, | Performed by: INTERNAL MEDICINE

## 2022-07-27 PROCEDURE — 93010 EKG 12-LEAD: ICD-10-PCS | Mod: ,,, | Performed by: INTERNAL MEDICINE

## 2022-07-27 PROCEDURE — 83690 ASSAY OF LIPASE: CPT | Performed by: NURSE PRACTITIONER

## 2022-07-27 NOTE — FIRST PROVIDER EVALUATION
Medical screening exam completed.  I have conducted a focused provider triage encounter, findings are as follows:    Brief history of present illness:  Patient presents with shortness of breath, weakness and abdominal pain.  Onset was 3 days ago.    There were no vitals filed for this visit.    Pertinent physical exam:      Brief workup plan:      Preliminary workup initiated; this workup will be continued and followed by the physician or advanced practice provider that is assigned to the patient when roomed.

## 2022-07-27 NOTE — ED PROVIDER NOTES
SCRIBE #1 NOTE: I, Ginger Rosenberg, am scribing for, and in the presence of, Dinah Lee MD. I have scribed the HPI, ROS, and PEx.          History     Chief Complaint   Patient presents with    Shortness of Breath     Presents with SOB, weakness and generalized abd pain x 3 days. Pt on Dobutamine drip. Denies CP     Review of patient's allergies indicates:  No Known Allergies      History of Present Illness     HPI    7/27/2022, 6:25 PM  History obtained from the patient      History of Present Illness: Avery Earl is a 66 y.o. male patient with a PMHx of CAD, CHF, pulmonary HTN, myocardial infarction, hyperlipidemia, HTN, and pancreatic cancer (patient reports does not want tx, not on chemo per patient), also on dobutamine drip has PICC line with EF 15% who presents to the Emergency Department for evaluation of abdominal pain which onset gradually one week ago. Pt's family member reports pt has pancreatic cancer but is not undergoing chemo due to concerns for his heart.  Symptoms are constant and moderate in severity. No mitigating or exacerbating factors reported. Associated sxs include generalized weakness. Patient denies any chest pian, fever, chills, shortness of breath (as mentioned in triage note), N/V, blood in stool, CP, HA, diarrhea, and all other sxs at this time. No prior Tx reported. No further complaints or concerns at this time.       Arrival mode: Personal vehicle      PCP: Magdaleno Escamilla MD        Past Medical History:  Past Medical History:   Diagnosis Date    Arthritis     Cardiomyopathy     Ischemic    CHF (congestive heart failure)     Coronary artery disease     Dyslipidemia     Hyperlipidemia     Hypertension     Mitral insufficiency     Myocardial infarction     Peripheral arterial disease     Pneumonia 12/2021    Pulmonary hypertension        Past Surgical History:  Past Surgical History:   Procedure Laterality Date    CORONARY ARTERY BYPASS GRAFT      [place] to LAD,  saphenous vein graft to right PDA    ENDOSCOPIC ULTRASOUND OF UPPER GASTROINTESTINAL TRACT N/A 2022    Procedure: ULTRASOUND, UPPER GI TRACT, ENDOSCOPIC;  Surgeon: Merritt Darby MD;  [facility];  Service: Endoscopy;  Laterality: N/A;    ERCP N/A 2022    Procedure: ERCP (ENDOSCOPIC RETROGRADE CHOLANGIOPANCREATOGRAPHY);  Surgeon: Merritt Darby MD;  Location: [facility];  Service: Endoscopy;  Laterality: N/A;    INSERTION OF IMPLANTABLE CARDIOVERTER-DEFIBRILLATOR (ICD) GENERATOR WITH TWO EXISTING LEADS           Family History:  No family history on file.    Social History:  Social History     Tobacco Use    Smoking status: Former Smoker     Types: Cigarettes     Quit date:      Years since quittin.5    Smokeless tobacco: Never Used   Substance and Sexual Activity    Alcohol use: Not Currently    Drug use: Never    Sexual activity: Not on file        Review of Systems     Review of Systems   Constitutional: Positive for appetite change and chills. Negative for fever.   HENT: Negative for sore throat.    Respiratory: Negative for shortness of breath.    Cardiovascular: Negative for chest pain.   Gastrointestinal: Positive for abdominal pain. Negative for blood in stool, diarrhea, nausea and vomiting.   Genitourinary: Negative for dysuria.   Musculoskeletal: Negative for back pain.   Skin: Negative for rash.   Neurological: Negative for weakness and headaches.   Hematological: Does not bruise/bleed easily.   All other systems reviewed and are negative.       Physical Exam     Initial Vitals [22 1640]   BP Pulse Resp Temp SpO2   (!) 93/54 68 18 99 °F (37.2 °C) 100 %      MAP       --          Physical Exam  Nursing Notes and Vital Signs Reviewed.  Constitutional: Patient is in no acute distress. Chronically ill appearing.  Head: Atraumatic. Normocephalic.  Eyes: PERRL. EOM intact. Conjunctivae are pale. No scleral icterus.  ENT: Mucous membranes are moist.  Oropharynx is clear and symmetric.    Neck: Supple. Full ROM. No lymphadenopathy.  Cardiovascular: Regular rate. Regular rhythm. No murmurs, rubs, or gallops. Distal pulses are 2+ and symmetric.  Pulmonary/Chest: No respiratory distress. Clear to auscultation bilaterally. No wheezing or rales.  Abdominal: Soft and non-distended.  There is no tenderness.  No rebound, guarding, or rigidity.  Musculoskeletal: Moves all extremities. No obvious deformities. No edema. No calf tenderness. PICC line to R arm.   Skin: Warm and dry.  Neurological:  Alert, awake, and appropriate.  Normal speech.  No acute focal neurological deficits are appreciated.  Psychiatric: Normal affect. Good eye contact. Appropriate in content.     ED Course   Procedures  ED Vital Signs:  Vitals:    07/27/22 1640 07/27/22 1823 07/27/22 1906 07/27/22 1915   BP: (!) 93/54 101/61     Pulse: 68 71 75    Resp: 18 (!) 21     Temp: 99 °F (37.2 °C)      TempSrc: Oral      SpO2: 100% 100%     Weight:    77.1 kg (169 lb 15.6 oz)       Abnormal Lab Results:  Labs Reviewed   CBC W/ AUTO DIFFERENTIAL - Abnormal; Notable for the following components:       Result Value    RBC 3.21 (*)     Hemoglobin 8.8 (*)     Hematocrit 26.7 (*)     MPV 8.7 (*)     All other components within normal limits   COMPREHENSIVE METABOLIC PANEL - Abnormal; Notable for the following components:    CO2 22 (*)     Calcium 10.9 (*)     Albumin 2.4 (*)     Alkaline Phosphatase 197 (*)     ALT 8 (*)     All other components within normal limits   LIPASE - Abnormal; Notable for the following components:    Lipase 3 (*)     All other components within normal limits   TYPE & SCREEN        All Lab Results:  Results for orders placed or performed during the hospital encounter of 07/27/22   CBC auto differential   Result Value Ref Range    WBC 8.34 3.90 - 12.70 K/uL    RBC 3.21 (L) 4.60 - 6.20 M/uL    Hemoglobin 8.8 (L) 14.0 - 18.0 g/dL    Hematocrit 26.7 (L) 40.0 - 54.0 %    MCV 83 82 - 98 fL     MCH 27.4 27.0 - 31.0 pg    MCHC 33.0 32.0 - 36.0 g/dL    RDW 13.0 11.5 - 14.5 %    Platelets 393 150 - 450 K/uL    MPV 8.7 (L) 9.2 - 12.9 fL    Immature Granulocytes 0.2 0.0 - 0.5 %    Gran # (ANC) 5.9 1.8 - 7.7 K/uL    Immature Grans (Abs) 0.02 0.00 - 0.04 K/uL    Lymph # 1.5 1.0 - 4.8 K/uL    Mono # 0.8 0.3 - 1.0 K/uL    Eos # 0.0 0.0 - 0.5 K/uL    Baso # 0.02 0.00 - 0.20 K/uL    nRBC 0 0 /100 WBC    Gran % 71.2 38.0 - 73.0 %    Lymph % 18.3 18.0 - 48.0 %    Mono % 9.6 4.0 - 15.0 %    Eosinophil % 0.5 0.0 - 8.0 %    Basophil % 0.2 0.0 - 1.9 %    Differential Method Automated    Comprehensive metabolic panel   Result Value Ref Range    Sodium 137 136 - 145 mmol/L    Potassium 3.7 3.5 - 5.1 mmol/L    Chloride 104 95 - 110 mmol/L    CO2 22 (L) 23 - 29 mmol/L    Glucose 77 70 - 110 mg/dL    BUN 9 8 - 23 mg/dL    Creatinine 0.6 0.5 - 1.4 mg/dL    Calcium 10.9 (H) 8.7 - 10.5 mg/dL    Total Protein 6.7 6.0 - 8.4 g/dL    Albumin 2.4 (L) 3.5 - 5.2 g/dL    Total Bilirubin 0.7 0.1 - 1.0 mg/dL    Alkaline Phosphatase 197 (H) 55 - 135 U/L    AST 15 10 - 40 U/L    ALT 8 (L) 10 - 44 U/L    Anion Gap 11 8 - 16 mmol/L    eGFR if African American >60 >60 mL/min/1.73 m^2    eGFR if non African American >60 >60 mL/min/1.73 m^2   Lipase   Result Value Ref Range    Lipase 3 (L) 4 - 60 U/L         Imaging Results:  Imaging Results          X-Ray Abdomen Flat And Erect (Final result)  Result time 07/27/22 19:29:59    Final result by Frederick Painting MD (07/27/22 19:29:59)                 Impression:      Nonobstructive small bowel gas pattern.      Electronically signed by: Frederick Painting  Date:    07/27/2022  Time:    19:29             Narrative:    EXAMINATION:  XR ABDOMEN FLAT AND ERECT    CLINICAL HISTORY:  Epigastric pain    TECHNIQUE:  Flat and erect AP views of the abdomen were performed.    COMPARISON:  None    FINDINGS:  Biliary metallic stent in place.    No air-fluid levels on upright radiograph.  No dilated loops of small  bowel on supine radiograph.    Stool burden within the colon is within normal limits.    No calculi overlie the renal shadows.    Osseous degenerative changes.                               X-Ray Chest PA And Lateral (Final result)  Result time 07/27/22 18:11:36    Final result by Frederick Painting MD (07/27/22 18:11:36)                 Impression:      No acute abnormality.      Electronically signed by: Frederick Painting  Date:    07/27/2022  Time:    18:11             Narrative:    EXAMINATION:  XR CHEST PA AND LATERAL    CLINICAL HISTORY:  Shortness of breath    TECHNIQUE:  PA and lateral views of the chest were performed.    COMPARISON:  04/05/2022    FINDINGS:  Left chest cardiac pacing device.  Right PICC line tip in good position.    The lungs are clear, with normal appearance of pulmonary vasculature and no pleural effusion or pneumothorax.    The cardiac silhouette is normal in size. The hilar and mediastinal contours are unremarkable.    Bones are intact.                                 The EKG was ordered, reviewed, and independently interpreted by the ED provider.  Interpretation time: 16:37  Rate: 82 BPM  Rhythm: normal sinus rhythm  Interpretation: Low voltage QRS. No STEMI.           The Emergency Provider reviewed the vital signs and test results, which are outlined above.     ED Discussion   7:56 PM: Reassessed pt at this time, family at bedside, does not want to wait to provide urine sample, labs at baseline, imaging of Chest and abdomen unremarkable, advised f urther follow up with his oncologist. Discussed with pt all pertinent ED information and results. Discussed pt dx and plan of tx. Gave pt all f/u and return to the ED instructions. All questions and concerns were addressed at this time. Pt expresses understanding of information and instructions, and is comfortable with plan to discharge. Pt is stable for discharge.    I discussed with patient and/or family/caretaker that evaluation in the ED  does not suggest any emergent or life threatening medical conditions requiring immediate intervention beyond what was provided in the ED, and I believe patient is safe for discharge.  Regardless, an unremarkable evaluation in the ED does not preclude the development or presence of a serious of life threatening condition. As such, patient was instructed to return immediately for any worsening or change in current symptoms.             Medical Decision Making:   Clinical Tests:   Lab Tests: Ordered and Reviewed  Radiological Study: Ordered and Reviewed  Medical Tests: Ordered and Reviewed           ED Medication(s):  Medications - No data to display    New Prescriptions    No medications on file        Follow-up Information     Hardeep Cole MD. Schedule an appointment as soon as possible for a visit in 1 day.    Specialty: Hematology and Oncology  Why: Return to the Emergency Room, If symptoms worsen  Contact information:  [id number]  [place] [address]  [phone]                             Scribe Attestation:   Scribe #1: I performed the above scribed service and the documentation accurately describes the services I performed. I attest to the accuracy of the note.     Attending:   Physician Attestation Statement for Scribe #1: I, Dinah Lee MD, personally performed the services described in this documentation, as scribed by Ginger Rosenberg, in my presence, and it is both accurate and complete.           Clinical Impression       ICD-10-CM ICD-9-CM   1. Epigastric pain  R10.13 789.06   2. Shortness of breath  R06.02 786.05   3. History of pancreatic cancer  Z85.07 V10.09   4. Chronic anemia  D64.9 285.9   5. Chronic heart failure, unspecified heart failure type  I50.9 428.9       Disposition:   Disposition: Discharged  Condition: Stable         Dinah Lee MD  07/27/22 2000

## 2022-07-28 ENCOUNTER — OFFICE VISIT (OUTPATIENT)
Dept: HEMATOLOGY/ONCOLOGY | Facility: CLINIC | Age: 67
End: 2022-07-28
Payer: MEDICARE

## 2022-07-28 VITALS
WEIGHT: 169.56 LBS | DIASTOLIC BLOOD PRESSURE: 62 MMHG | HEIGHT: 74 IN | BODY MASS INDEX: 21.76 KG/M2 | OXYGEN SATURATION: 98 % | TEMPERATURE: 98 F | SYSTOLIC BLOOD PRESSURE: 96 MMHG | HEART RATE: 77 BPM

## 2022-07-28 DIAGNOSIS — C25.0 MALIGNANT NEOPLASM OF HEAD OF PANCREAS: Primary | ICD-10-CM

## 2022-07-28 PROCEDURE — 3074F PR MOST RECENT SYSTOLIC BLOOD PRESSURE < 130 MM HG: ICD-10-PCS | Mod: CPTII,S$GLB,, | Performed by: NURSE PRACTITIONER

## 2022-07-28 PROCEDURE — 1101F PR PT FALLS ASSESS DOC 0-1 FALLS W/OUT INJ PAST YR: ICD-10-PCS | Mod: CPTII,S$GLB,, | Performed by: NURSE PRACTITIONER

## 2022-07-28 PROCEDURE — 99999 PR PBB SHADOW E&M-EST. PATIENT-LVL IV: ICD-10-PCS | Mod: PBBFAC,,, | Performed by: NURSE PRACTITIONER

## 2022-07-28 PROCEDURE — 99999 PR PBB SHADOW E&M-EST. PATIENT-LVL IV: CPT | Mod: PBBFAC,,, | Performed by: NURSE PRACTITIONER

## 2022-07-28 PROCEDURE — 1125F AMNT PAIN NOTED PAIN PRSNT: CPT | Mod: CPTII,S$GLB,, | Performed by: NURSE PRACTITIONER

## 2022-07-28 PROCEDURE — 3008F PR BODY MASS INDEX (BMI) DOCUMENTED: ICD-10-PCS | Mod: CPTII,S$GLB,, | Performed by: NURSE PRACTITIONER

## 2022-07-28 PROCEDURE — 3288F FALL RISK ASSESSMENT DOCD: CPT | Mod: CPTII,S$GLB,, | Performed by: NURSE PRACTITIONER

## 2022-07-28 PROCEDURE — 1157F ADVNC CARE PLAN IN RCRD: CPT | Mod: CPTII,S$GLB,, | Performed by: NURSE PRACTITIONER

## 2022-07-28 PROCEDURE — 1101F PT FALLS ASSESS-DOCD LE1/YR: CPT | Mod: CPTII,S$GLB,, | Performed by: NURSE PRACTITIONER

## 2022-07-28 PROCEDURE — 1125F PR PAIN SEVERITY QUANTIFIED, PAIN PRESENT: ICD-10-PCS | Mod: CPTII,S$GLB,, | Performed by: NURSE PRACTITIONER

## 2022-07-28 PROCEDURE — 3074F SYST BP LT 130 MM HG: CPT | Mod: CPTII,S$GLB,, | Performed by: NURSE PRACTITIONER

## 2022-07-28 PROCEDURE — 1159F PR MEDICATION LIST DOCUMENTED IN MEDICAL RECORD: ICD-10-PCS | Mod: CPTII,S$GLB,, | Performed by: NURSE PRACTITIONER

## 2022-07-28 PROCEDURE — 1159F MED LIST DOCD IN RCRD: CPT | Mod: CPTII,S$GLB,, | Performed by: NURSE PRACTITIONER

## 2022-07-28 PROCEDURE — 99214 OFFICE O/P EST MOD 30 MIN: CPT | Mod: S$GLB,,, | Performed by: NURSE PRACTITIONER

## 2022-07-28 PROCEDURE — 1157F PR ADVANCE CARE PLAN OR EQUIV PRESENT IN MEDICAL RECORD: ICD-10-PCS | Mod: CPTII,S$GLB,, | Performed by: NURSE PRACTITIONER

## 2022-07-28 PROCEDURE — 3078F PR MOST RECENT DIASTOLIC BLOOD PRESSURE < 80 MM HG: ICD-10-PCS | Mod: CPTII,S$GLB,, | Performed by: NURSE PRACTITIONER

## 2022-07-28 PROCEDURE — 1160F RVW MEDS BY RX/DR IN RCRD: CPT | Mod: CPTII,S$GLB,, | Performed by: NURSE PRACTITIONER

## 2022-07-28 PROCEDURE — 3008F BODY MASS INDEX DOCD: CPT | Mod: CPTII,S$GLB,, | Performed by: NURSE PRACTITIONER

## 2022-07-28 PROCEDURE — 3288F PR FALLS RISK ASSESSMENT DOCUMENTED: ICD-10-PCS | Mod: CPTII,S$GLB,, | Performed by: NURSE PRACTITIONER

## 2022-07-28 PROCEDURE — 3078F DIAST BP <80 MM HG: CPT | Mod: CPTII,S$GLB,, | Performed by: NURSE PRACTITIONER

## 2022-07-28 PROCEDURE — 99214 PR OFFICE/OUTPT VISIT, EST, LEVL IV, 30-39 MIN: ICD-10-PCS | Mod: S$GLB,,, | Performed by: NURSE PRACTITIONER

## 2022-07-28 PROCEDURE — 1160F PR REVIEW ALL MEDS BY PRESCRIBER/CLIN PHARMACIST DOCUMENTED: ICD-10-PCS | Mod: CPTII,S$GLB,, | Performed by: NURSE PRACTITIONER

## 2022-07-28 NOTE — PROGRESS NOTES
Subjective:       Patient ID: Avery Earl is a 66 y.o. male.    Chief Complaint:   1. Malignant neoplasm of head of pancreas       Current Treatment:   None    Treatment History:  S/p biliary stent placement    HPI: This is a 66-year-old male with medical history significant for CHF, PAD, hyperlipidemia, pulmonary HTN, cardiomyopathy, arthritis, MI, mitral insufficiency, and HTN who was referred to us due to recent finding of positive malignant cells in the common bile duct brushing.  Apparently, he was recently seen in the emergency room due to painless jaundice.  Ultrasound obtained at that time revealed a 3.6 cm solid mass at the head of pancreas.     Common bile duct brushings returned positive for malignancy. EUS unable to be performed at the time due to complication with scope per GI.  Given positive malignant cells, he was advised to follow with Oncology for further management.    Tumor board recommendations are to treat with palliative Gemzar/Abraxane. Recommendations were discussed with him at his last visit, and he was indecisive at the time.     His primary Hematologist/Oncologist is Dr. Cole.    Interval History: Patient presents for follow up after ER visit yesterday for abdominal pain. He presents today in a wheelchair with his wife. He complains of abdominal pain, lower back pain, and fatigue. Reviewed labs with patient: H&H 8.8 & 26.7; RBC 3.21; remaining CBC unremarkable. CMP reveals slightly elevated at 10.9. Alk Phos down to 197. Bili now WNL. Explained to the patient that fatigue is likely secondary to anemia which is common with solid tumor cancers. Also explained to him that his pain is also likely due to his pancreatic mass. He is open to being referred to Palliative Care to help manage his symptoms. He is adamant about not doing chemotherapy and chooses to follow up in 2 months with labs.     Social History     Socioeconomic History    Marital status: Single   Tobacco Use    Smoking  status: Former Smoker     Types: Cigarettes     Quit date:      Years since quittin.5    Smokeless tobacco: Never Used   Substance and Sexual Activity    Alcohol use: Not Currently    Drug use: Never     Past Medical History:   Diagnosis Date    Arthritis     Cardiomyopathy     Ischemic    CHF (congestive heart failure)     Coronary artery disease     Dyslipidemia     Hyperlipidemia     Hypertension     Mitral insufficiency     Myocardial infarction     Peripheral arterial disease     Pneumonia 2021    Pulmonary hypertension      No family history on file.  Past Surgical History:   Procedure Laterality Date    CORONARY ARTERY BYPASS GRAFT      [place] to LAD, saphenous vein graft to right PDA    ENDOSCOPIC ULTRASOUND OF UPPER GASTROINTESTINAL TRACT N/A 2022    Procedure: ULTRASOUND, UPPER GI TRACT, ENDOSCOPIC;  Surgeon: Merritt Darby MD;  Location: [facility];  Service: Endoscopy;  Laterality: N/A;    ERCP N/A 2022    Procedure: ERCP (ENDOSCOPIC RETROGRADE CHOLANGIOPANCREATOGRAPHY);  Surgeon: Merritt Darby MD;  Location: [facility];  Service: Endoscopy;  Laterality: N/A;    INSERTION OF IMPLANTABLE CARDIOVERTER-DEFIBRILLATOR (ICD) GENERATOR WITH TWO EXISTING LEADS       Review of Systems   Constitutional: Positive for fatigue. Negative for appetite change.   HENT: Negative for mouth sores, rhinorrhea and sore throat.    Eyes: Negative.    Respiratory: Negative.    Cardiovascular: Negative.    Gastrointestinal: Positive for abdominal pain. Negative for constipation, diarrhea, nausea and vomiting.   Endocrine: Negative.    Genitourinary: Negative.    Musculoskeletal: Positive for back pain.   Integumentary:  Negative.   Allergic/Immunologic: Negative.    Neurological: Positive for weakness. Negative for numbness.   Hematological: Negative.    Psychiatric/Behavioral: Negative.        Medication List with Changes/Refills   Current Medications     AMOXICILLIN-CLAVULANATE 875-125MG (AUGMENTIN) 875-125 MG PER TABLET    Take 1 tablet by mouth every 12 (twelve) hours.    APIXABAN (ELIQUIS) 5 MG TAB    Take 5 mg by mouth once daily.    ASPIRIN 81 MG CHEW    Take 81 mg by mouth once daily.    B COMPLEX VITAMINS CAPSULE    Take 1 capsule by mouth once daily.    BUMETANIDE (BUMEX) 1 MG TABLET    Take 1 mg by mouth once daily.    CHOLECALCIFEROL, VITAMIN D3, (VITAMIN D3) 25 MCG (1,000 UNIT) CAPSULE    Take 1,000 Units by mouth once daily.    CYPROHEPTADINE (PERIACTIN) 4 MG TABLET    Take 4 mg by mouth 3 (three) times daily with meals.    DOBUTAMINE (DOBUTREX) 1,000 MG/250 ML (4,000 MCG/ML) INFUSION    Inject 227 mcg/min into the vein continuous.    ENTRESTO 49-51 MG PER TABLET    Take 49-51 tablets by mouth 2 (two) times a day.    OMEGA-3/DHA/EPA/ALA/VITAMIN D3 (OMEGA-3-DHA-EPA-ALA-VIT D3 ORAL)    Take 2 capsules by mouth once daily at 6am.    POTASSIUM CHLORIDE SA (K-DUR,KLOR-CON) 20 MEQ TABLET    Take 20 mEq by mouth once daily.    SPIRONOLACTONE (ALDACTONE) 25 MG TABLET    Take 25 mg by mouth once daily.     Objective:   There were no vitals filed for this visit.    Physical Exam  Vitals reviewed.   Constitutional:       Appearance: Normal appearance.   HENT:      Head: Normocephalic.      Mouth/Throat:      Comments: Wearing mask    Eyes:      Extraocular Movements: Extraocular movements intact.      Pupils: Pupils are equal, round, and reactive to light.      Comments: Glasses     Cardiovascular:      Rate and Rhythm: Normal rate and regular rhythm.      Heart sounds: Normal heart sounds.   Pulmonary:      Effort: Pulmonary effort is normal.      Breath sounds: Normal breath sounds.   Abdominal:      General: Bowel sounds are normal.      Palpations: Abdomen is soft.      Comments: rounded     Genitourinary:     Comments: deferred    Musculoskeletal:      Cervical back: Normal range of motion and neck supple.      Comments: Presents in wheelchair   Skin:      General: Skin is warm and dry.   Neurological:      Mental Status: He is alert and oriented to person, place, and time.   Psychiatric:         Behavior: Behavior normal.         Thought Content: Thought content normal.          (2) Ambulatory and capable of self care, unable to carry out work activity, up and about > 50% or waking hours  Assessment:     Problem List Items Addressed This Visit        Oncology    Malignant neoplasm of head of pancreas - Primary     Probable pancreatic cancer.  Noted positive malignant cells from common bile duct brushing.  Noted normal serum tumor marker CA 19 9. Reviewed ultrasound results with family and patient and answered their questions.     Staging CT scan from 05/06/2022 showed a 5.3 cm hypoenhancing lesion in the inferior right liver suspicious for metastatic disease, additional 1.6 cm lesion at the inferior right liver.  Also reported and ill-defined hypoenhancing 2.8 cm pancreatic head mass and 2.5 x 1.4 cm aortocaval retroperitoneal lymph node.     Advised patient that based on the above radiographic study, it appears he has and unresectable pancreatic cancer.       Discussed case at tumor board, based on risk of anesthesia, the board recommended against tissue biopsy. Have ordered germline testing, awaiting results.     He understands that his disease is incurable but can be treated.      Regimen:  Gemcitabine plus Abraxane     Most recent 2D-ECHO showed ejection fraction of 15%. Repeated ECHO showed 15% EF.      Patient still indecisive as to whether to proceed with chemotherapy.  He understands that there is risk of worsening cardiac function.     He will contact us before the end of the week with his decision.                Plan:     Malignant neoplasm of head of pancreas    Labs reviewed.   Referral to Palliative Care.   Follow up in 2 months with Ca 19-9, CBC and Comprehensive Metabolic Panel.    I will review assessment/plan with collaborating physician   Ezekwudo.    AMOR Palma

## 2022-08-11 ENCOUNTER — TELEPHONE (OUTPATIENT)
Dept: PALLIATIVE MEDICINE | Facility: CLINIC | Age: 67
End: 2022-08-11
Payer: MEDICARE

## 2022-08-11 NOTE — TELEPHONE ENCOUNTER
Palliative Referral Nurse Note    Nurse reached out to pt to scheduled a palliative apt, pt's family member informed nurse pt was admitted to [facility] on [facility]- Nurse Nathaly was called to clarity on  Pt's cardiology apt, Hospice Nurse stated pt was admit and accepted to hospice  for his cancer, he will continue to follow up with his [facility] cardiology.    Cheiloplasty (Complex) Text: A decision was made to reconstruct the defect with a  cheiloplasty.  The defect was undermined extensively.  Additional obicularis oris muscle was excised with a 15 blade scalpel.  The defect was converted into a full thickness wedge to facilite a better cosmetic result.  Small vessels were then tied off with 5-0 monocyrl. The obicularis oris, superficial fascia, adipose and dermis were then reapproximated.  After the deeper layers were approximated the epidermis was reapproximated with particular care given to realign the vermilion border.

## 2022-08-11 NOTE — TELEPHONE ENCOUNTER
----- Message from Margarette Garcia sent at 8/11/2022 12:17 PM CDT -----  Contact: Delmi Awad called to consult with nurse or staff regarding the patient. She states she is needing some information clarified. She also state the nurse from hospice tried calling the office but wasn't able to get in touch with anyone. Delmi would like a call back and can be reached at [phone]. Thanks/MR

## 2022-08-19 ENCOUNTER — TELEPHONE (OUTPATIENT)
Dept: CARDIOLOGY | Facility: CLINIC | Age: 67
End: 2022-08-19
Payer: MEDICARE

## 2022-08-19 NOTE — TELEPHONE ENCOUNTER
----- Message from Javi Turk MD sent at 8/19/2022  2:07 PM CDT -----  Regarding: RE: TI and corvue show possible fluid overload  Can I see the patient in the office next week, thanks  ----- Message -----  From: Guera Peter RN  Sent: 8/19/2022  12:19 PM CDT  To: Javi Turk MD  Subject: TI and corvue show possible fluid overload       Hi Dr. Turk,    Received alert from pt's ICD about possible fluid overload. Pt reports a little swelling in his ankles that is intermittent and per his baseline. Pt reports urinating 4-5x/day and taking spironolactone. Pt states he heard his ICD start alarming yesterday. Alert was for low RV lead impedance. It appears impedance is back to baseline today at 285ohms, but pt will continue to hear alert until he is interrogated. Pt could not come in today due to no ride, so he will come in for quick spot check on 8/22/22 with [employer] rep. Pt has CHF f/u with you on 8/31/22. I just wanted to make you aware.    Thanks,  Guera Peter

## 2022-08-22 ENCOUNTER — DOCUMENTATION ONLY (OUTPATIENT)
Dept: CARDIOLOGY | Facility: HOSPITAL | Age: 67
End: 2022-08-22
Payer: MEDICARE

## 2022-08-22 ENCOUNTER — TELEPHONE (OUTPATIENT)
Dept: CARDIOLOGY | Facility: HOSPITAL | Age: 67
End: 2022-08-22
Payer: MEDICARE

## 2022-08-22 ENCOUNTER — HOSPITAL ENCOUNTER (OUTPATIENT)
Dept: CARDIOLOGY | Facility: HOSPITAL | Age: 67
Discharge: HOME | End: 2022-08-22
Attending: INTERNAL MEDICINE
Payer: MEDICARE

## 2022-08-22 ENCOUNTER — HOSPITAL ENCOUNTER (OUTPATIENT)
Dept: RADIOLOGY | Facility: HOSPITAL | Age: 67
Discharge: HOME | End: 2022-08-22
Attending: INTERNAL MEDICINE
Payer: MEDICARE

## 2022-08-22 DIAGNOSIS — Z95.810 ICD (IMPLANTABLE CARDIOVERTER-DEFIBRILLATOR) IN PLACE: Primary | ICD-10-CM

## 2022-08-22 DIAGNOSIS — Z95.810 ICD (IMPLANTABLE CARDIOVERTER-DEFIBRILLATOR) IN PLACE: ICD-10-CM

## 2022-08-22 DIAGNOSIS — I50.9 CONGESTIVE HEART FAILURE, UNSPECIFIED HF CHRONICITY, UNSPECIFIED HEART FAILURE TYPE: ICD-10-CM

## 2022-08-22 DIAGNOSIS — I25.5 ISCHEMIC CARDIOMYOPATHY: ICD-10-CM

## 2022-08-22 PROCEDURE — 71046 X-RAY EXAM CHEST 2 VIEWS: CPT | Mod: TC

## 2022-08-22 PROCEDURE — 93282 PRGRMG EVAL IMPLANTABLE DFB: CPT

## 2022-08-22 PROCEDURE — 93282 PRGRMG EVAL IMPLANTABLE DFB: CPT | Mod: 26,GW,, | Performed by: INTERNAL MEDICINE

## 2022-08-22 PROCEDURE — 71046 X-RAY EXAM CHEST 2 VIEWS: CPT | Mod: 26,GV,, | Performed by: RADIOLOGY

## 2022-08-22 PROCEDURE — 93282 CARDIAC DEVICE CHECK - IN CLINIC & HOSPITAL: ICD-10-PCS | Mod: 26,GW,, | Performed by: INTERNAL MEDICINE

## 2022-08-22 PROCEDURE — 71046 XR CHEST PA AND LATERAL: ICD-10-PCS | Mod: 26,GV,, | Performed by: RADIOLOGY

## 2022-08-22 NOTE — PROGRESS NOTES
"Pt returned to clinic to determine cause of ICD alert. All impedances including RV impedance are all within range, no abnormalities visible on trends either. Called [employer] tech service who had me send pdf to them. Per tech services, ICD alert tone pt heard after the interrogation was due to ICD coming into contact with a magnet (a constant tone for 10sec unlike the "melony backing up" sound which means there is an actual alert triggering). While pt was in clinic, four short beeps heard, and it was due to pt's IV infusion pump completing its administration. Tech services states pt's portable IV pump may have been the cause of the magnet contact. With pt's approval, RV lead impedance patient alert turned OFF in case a fluke occurs with another RV impedance out of range (Pt has hx of this in May and August). Pt's RV lead impedance alert was still transmit via home monitor. New home monitor wand will arrived at pt's home in 2-3days per Delmi. Pt still to get chest xray done today and will f/u with Dr. Turk at previously scheduled appt on 8/31/22.   "

## 2022-08-23 ENCOUNTER — PATIENT MESSAGE (OUTPATIENT)
Dept: CARDIOLOGY | Facility: CLINIC | Age: 67
End: 2022-08-23
Payer: MEDICARE

## 2022-08-31 ENCOUNTER — OFFICE VISIT (OUTPATIENT)
Dept: CARDIOLOGY | Facility: CLINIC | Age: 67
End: 2022-08-31
Payer: MEDICARE

## 2022-08-31 VITALS
HEIGHT: 74 IN | HEART RATE: 94 BPM | SYSTOLIC BLOOD PRESSURE: 82 MMHG | DIASTOLIC BLOOD PRESSURE: 54 MMHG | OXYGEN SATURATION: 100 % | BODY MASS INDEX: 21.69 KG/M2 | WEIGHT: 169 LBS

## 2022-08-31 DIAGNOSIS — Z76.89 ESTABLISHING CARE WITH NEW DOCTOR, ENCOUNTER FOR: ICD-10-CM

## 2022-08-31 DIAGNOSIS — I34.0 NONRHEUMATIC MITRAL VALVE REGURGITATION: ICD-10-CM

## 2022-08-31 DIAGNOSIS — I27.22 PULMONARY HYPERTENSION DUE TO LEFT HEART DISEASE: ICD-10-CM

## 2022-08-31 DIAGNOSIS — I25.10 CORONARY ARTERY DISEASE INVOLVING NATIVE CORONARY ARTERY OF NATIVE HEART WITHOUT ANGINA PECTORIS: ICD-10-CM

## 2022-08-31 DIAGNOSIS — I25.5 ISCHEMIC CARDIOMYOPATHY: ICD-10-CM

## 2022-08-31 DIAGNOSIS — I50.42 CHRONIC COMBINED SYSTOLIC AND DIASTOLIC CONGESTIVE HEART FAILURE: ICD-10-CM

## 2022-08-31 DIAGNOSIS — Z95.810 ICD (IMPLANTABLE CARDIOVERTER-DEFIBRILLATOR) IN PLACE: Primary | ICD-10-CM

## 2022-08-31 DIAGNOSIS — R53.1 WEAKNESS: ICD-10-CM

## 2022-08-31 DIAGNOSIS — R07.9 CHEST PAIN, UNSPECIFIED TYPE: ICD-10-CM

## 2022-08-31 DIAGNOSIS — I50.9 CONGESTIVE HEART FAILURE, UNSPECIFIED HF CHRONICITY, UNSPECIFIED HEART FAILURE TYPE: ICD-10-CM

## 2022-08-31 DIAGNOSIS — I73.9 CLAUDICATION IN PERIPHERAL VASCULAR DISEASE: ICD-10-CM

## 2022-08-31 PROCEDURE — 99214 OFFICE O/P EST MOD 30 MIN: CPT | Mod: S$PBB,GW,, | Performed by: INTERNAL MEDICINE

## 2022-08-31 PROCEDURE — 99999 PR PBB SHADOW E&M-EST. PATIENT-LVL III: CPT | Mod: PBBFAC,,, | Performed by: INTERNAL MEDICINE

## 2022-08-31 PROCEDURE — 99214 PR OFFICE/OUTPT VISIT, EST, LEVL IV, 30-39 MIN: ICD-10-PCS | Mod: S$PBB,GW,, | Performed by: INTERNAL MEDICINE

## 2022-08-31 PROCEDURE — 99999 PR PBB SHADOW E&M-EST. PATIENT-LVL III: ICD-10-PCS | Mod: PBBFAC,,, | Performed by: INTERNAL MEDICINE

## 2022-08-31 PROCEDURE — 99213 OFFICE O/P EST LOW 20 MIN: CPT | Mod: PBBFAC | Performed by: INTERNAL MEDICINE

## 2022-08-31 NOTE — PROGRESS NOTES
Subjective:   Patient ID:  Avery Earl is a 66 y.o. male who presents for follow-up of No chief complaint on file.  Pt is a 67 yo male with pMHx of CABG, ischemic cardiomyopathy on continuous dobutamine infusion at home, DVT, PAD, HPL, severe mitral insuffiencey and pulmonary hypertension. He presents today due to dark, tea colored urine onset approx 2 weeks and scleral jaundice for approx 1.5 weeks. He endorses intermittent generalized abdominal pain and back pain. Within the last 3 months, pt has lost approx 30 pounds as he had all his teeth extracted due to possible cardiac procedure. He describes KIMBALL and bilateral lower leg swelling.   In the ED, temp 98, pulse 92, resp 14, B/P 86/55 and SpO2 100 %  Labs find H/H 9.5/25.6, Na 132, potassium 3.4, gluc 135, alk phos 977, total bilirubin 18.7 and AST 90 and ALT 62  Urine is tea colored and analysis reflects multiple abnormalities.  CXR - no acute cardiopulmonary findings  Abdominal US - 3.6 cm solid mass seen at the level the head of the pancreas with significant ductal dilatation of the pancreatic duct measuring up to 11 mm.   Moderate intrahepatic and common bile duct dilatation. 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  Recommend ERCP with endoscopic ultrasound and tissue sampling.  ED spoke with Dr. Miller - hold further anticoagulation - full consult pending by GI.   Cardiology to provide opinion regarding cardiac clearance given ischemic cardiomyopathy prior to ERCP.  Patient presents to the office after follow-up in the hospital for jaundice and evidence of ERCP necessary for stricture of the common bile duct.  Patient has pathology that is suspicious for malignancy will be discussed with the patient at his next GI visit.  Patient is otherwise feeling well back on all medications for heart failure and stable no exertional symptoms of chest pain or shortness of breath and no edema today patient doing  well this time.        This visit finds patient feeling generally well.  He is weak he is tired.  Patient also has seen the oncologist and he has an unresectable pancreatic cancer.  He does not want take chemotherapy due to risk of heart failure.  Overall LV function is 15% or last by cardiac echo and unchanged on chronic medications.  He is not short of breath he has no edema feels generally well and like to continue this way on chronic cardiac medications.       Overall the patient is doing well no change in real symptoms.  No edema today all medications reviewed renewed.  Family present today all questions answered.      Review of Systems   Constitutional: Negative for chills, diaphoresis, night sweats, weight gain and weight loss.   HENT:  Negative for congestion, hoarse voice, sore throat and stridor.    Eyes:  Negative for double vision and pain.   Cardiovascular:  Negative for chest pain, claudication, cyanosis, dyspnea on exertion, irregular heartbeat, leg swelling, near-syncope, orthopnea, palpitations, paroxysmal nocturnal dyspnea and syncope.   Respiratory:  Negative for cough, hemoptysis, shortness of breath, sleep disturbances due to breathing, snoring, sputum production and wheezing.    Endocrine: Negative for cold intolerance, heat intolerance and polydipsia.   Hematologic/Lymphatic: Negative for bleeding problem. Does not bruise/bleed easily.   Skin:  Negative for color change, dry skin and rash.   Musculoskeletal:  Negative for joint swelling and muscle cramps.   Gastrointestinal:  Negative for bloating, abdominal pain, constipation, diarrhea, dysphagia, melena, nausea and vomiting.   Genitourinary:  Negative for flank pain and urgency.   Neurological:  Negative for dizziness, focal weakness, headaches, light-headedness, loss of balance, seizures and weakness.   Psychiatric/Behavioral:  Negative for altered mental status and memory loss. The patient is not nervous/anxious.    No family history on  file.  Past Medical History:   Diagnosis Date    Arthritis     Cardiomyopathy     Ischemic    CHF (congestive heart failure)     Coronary artery disease     Dyslipidemia     Hyperlipidemia     Hypertension     Mitral insufficiency     Myocardial infarction     Peripheral arterial disease     Pneumonia 2021    Pulmonary hypertension      Social History     Socioeconomic History    Marital status: Single   Tobacco Use    Smoking status: Former     Types: Cigarettes     Quit date:      Years since quittin.6    Smokeless tobacco: Never   Substance and Sexual Activity    Alcohol use: Not Currently    Drug use: Never     Current Outpatient Medications on File Prior to Visit   Medication Sig Dispense Refill    amoxicillin-clavulanate 875-125mg (AUGMENTIN) 875-125 mg per tablet Take 1 tablet by mouth every 12 (twelve) hours. 10 tablet 0    apixaban (ELIQUIS) 5 mg Tab Take 5 mg by mouth once daily.      aspirin 81 MG Chew Take 81 mg by mouth once daily.      b complex vitamins capsule Take 1 capsule by mouth once daily.      bumetanide (BUMEX) 1 MG tablet Take 1 mg by mouth once daily.      cholecalciferol, vitamin D3, (VITAMIN D3) 25 mcg (1,000 unit) capsule Take 1,000 Units by mouth once daily.      cyproheptadine (PERIACTIN) 4 mg tablet Take 4 mg by mouth 3 (three) times daily with meals.      DOBUTamine (DOBUTREX) 1,000 mg/250 mL (4,000 mcg/mL) infusion Inject 227 mcg/min into the vein continuous.      ENTRESTO 49-51 mg per tablet Take 49-51 tablets by mouth 2 (two) times a day.      omega-3/dha/epa/ala/vitamin D3 (OMEGA-3-DHA-EPA-ALA-VIT D3 ORAL) Take 2 capsules by mouth once daily at 6am.      potassium chloride SA (K-DUR,KLOR-CON) 20 MEQ tablet Take 20 mEq by mouth once daily.      spironolactone (ALDACTONE) 25 MG tablet Take 25 mg by mouth once daily.       No current facility-administered medications on file prior to visit.     Review of patient's allergies indicates:  No Known Allergies    Objective:      Physical Exam  Eyes:      Pupils: Pupils are equal, round, and reactive to light.   Neck:      Trachea: No tracheal deviation.   Cardiovascular:      Rate and Rhythm: Normal rate and regular rhythm.      Pulses: Intact distal pulses.           Carotid pulses are 2+ on the right side and 2+ on the left side.       Radial pulses are 2+ on the right side and 2+ on the left side.        Femoral pulses are 2+ on the right side and 2+ on the left side.       Popliteal pulses are 2+ on the right side and 2+ on the left side.        Dorsalis pedis pulses are 2+ on the right side and 2+ on the left side.        Posterior tibial pulses are 2+ on the right side and 2+ on the left side.      Heart sounds: Normal heart sounds. No murmur heard.    No friction rub. No gallop.   Pulmonary:      Effort: Pulmonary effort is normal. No respiratory distress.      Breath sounds: Normal breath sounds. No stridor. No wheezing or rales.   Chest:      Chest wall: No tenderness.   Abdominal:      General: There is no distension.      Tenderness: There is no abdominal tenderness. There is no rebound.   Musculoskeletal:         General: No tenderness.      Cervical back: Normal range of motion.   Skin:     General: Skin is warm and dry.   Neurological:      Mental Status: He is alert and oriented to person, place, and time.       Battery and Leads (BL)   Abnormality in parameters identified on lead(s)   Normal battery parameters     Presenting Rhythm (PA)   Premature Ventricular Contraction(s) on presenting rhythm   Ventricular Sensing (VS)     Arrhythmic events (AE)   Premature Ventricular Contraction(s)   Nonsustained VT (nsVT) events identified     Anticoagulation  (AC)   Patient prescribed Apixaban (Eliquis)   Current anticoagulation status obtained from data maintained by practitioner     Heart Failure (HF)   A significant change is in activity level is noted on trending   Significant decrease in transthoracic impedance      Transmission Information (TI)   Device Summary Report     Follow Up (FU)   Practice contacted with findings based on Escalation Criteria   Continue remote monitoring quarterly     Additional Comments   ICD Report   Last Remote Transmission 5/17/22     RV amplitude,  4.9 mV. Sensitivity programmed 0.30 mV. Consistent with trends.     15 VT-NS events, longest on 6/25/22 at 7:54pm, 9.4 sec duration, average V rate 190 bpm. EGM #154 on 6/25/22 occurred at the same time suggesting same event. Available EGMs illustrate nsVT. PVCs noted.     Optivol index shows increase above threshold of 80, correlating with TI   decrease since 5/17/22, ongoing. Trend shows Optivol index started to decrease towards baseline since 6/27/22.     Patient activity level illustrates decrease from approximately 1 hr/day to 30min/day.     TI??suggestive of possible decompensation escalated to follow up tab on 07/19/22 7:41 AM CST.  Assign to physician after review.     Prepared by [facility]    nurse with pt and reports swelling in legs/feet is still present but has improved from the last few weeks. Nurse reports he has lost weight from not eating (pt recently had his teeth removed for dentures and they don't fit correctly- so it is hard for    him to eat) and is currently around 160lbs. Pt reports SOB x 2days with activity. Last appt with Dr. Infante was in April. pt has upcoming appt with Dr. Turk on 8/31/22. //NG      Result Image Hyperlink     Show images for Cardiac device check -  Assessment:     1. ICD (implantable cardioverter-defibrillator) in place    2. Weakness    3. Establishing care with new doctor, encounter for    4. Coronary artery disease involving native coronary artery of native heart without angina pectoris    5. Congestive heart failure, unspecified HF chronicity, unspecified heart failure type    6. Chronic combined systolic and diastolic congestive heart failure    7. Ischemic cardiomyopathy    8. Nonrheumatic  mitral valve regurgitation    9. Claudication in peripheral vascular disease    10. Chest pain, unspecified type    11. Pulmonary hypertension due to left heart disease        Plan:     ICD (implantable cardioverter-defibrillator) in place    Weakness    Establishing care with new doctor, encounter for    Coronary artery disease involving native coronary artery of native heart without angina pectoris    Congestive heart failure, unspecified HF chronicity, unspecified heart failure type    Chronic combined systolic and diastolic congestive heart failure    Ischemic cardiomyopathy    Nonrheumatic mitral valve regurgitation    Claudication in peripheral vascular disease    Chest pain, unspecified type    Pulmonary hypertension due to left heart disease      Impression 1. Congestive heart failure chronic systolic heart dysfunction stable on chronic medications and continues on dobutamine infusion, the patient continues on Entresto and spironolactone.  2. Ischemic cardiomyopathy stable on medications   3 peripheral edema stable   4. No further symptoms have developed patient otherwise stable and will continue all medications as reviewed renewed.  Patient continues on apixaban  Patient will be following up in 4-6 months.  Sooner if acute recurrence symptoms.  Weight is stable today.

## 2022-09-05 ENCOUNTER — HOSPITAL ENCOUNTER (OUTPATIENT)
Facility: HOSPITAL | Age: 67
Discharge: HOSPICE | End: 2022-09-06
Attending: EMERGENCY MEDICINE | Admitting: INTERNAL MEDICINE
Payer: MEDICARE

## 2022-09-05 DIAGNOSIS — R10.9 ABDOMINAL PAIN: ICD-10-CM

## 2022-09-05 DIAGNOSIS — E83.52 HYPERCALCEMIA: Primary | ICD-10-CM

## 2022-09-05 DIAGNOSIS — K59.00 CONSTIPATION, UNSPECIFIED CONSTIPATION TYPE: ICD-10-CM

## 2022-09-05 DIAGNOSIS — C25.0 MALIGNANT NEOPLASM OF HEAD OF PANCREAS: ICD-10-CM

## 2022-09-05 DIAGNOSIS — R07.9 CHEST PAIN: ICD-10-CM

## 2022-09-05 DIAGNOSIS — R10.9 ABDOMINAL PAIN, UNSPECIFIED ABDOMINAL LOCATION: ICD-10-CM

## 2022-09-05 LAB
ALBUMIN SERPL BCP-MCNC: 2.3 G/DL (ref 3.5–5.2)
ALP SERPL-CCNC: 214 U/L (ref 55–135)
ALT SERPL W/O P-5'-P-CCNC: 11 U/L (ref 10–44)
ANION GAP SERPL CALC-SCNC: 9 MMOL/L (ref 8–16)
AST SERPL-CCNC: 26 U/L (ref 10–40)
BASOPHILS # BLD AUTO: 0.04 K/UL (ref 0–0.2)
BASOPHILS NFR BLD: 0.3 % (ref 0–1.9)
BILIRUB SERPL-MCNC: 0.7 MG/DL (ref 0.1–1)
BUN SERPL-MCNC: 17 MG/DL (ref 8–23)
CALCIUM SERPL-MCNC: 12.5 MG/DL (ref 8.7–10.5)
CHLORIDE SERPL-SCNC: 102 MMOL/L (ref 95–110)
CO2 SERPL-SCNC: 20 MMOL/L (ref 23–29)
CREAT SERPL-MCNC: 0.7 MG/DL (ref 0.5–1.4)
CTP QC/QA: YES
DIFFERENTIAL METHOD: ABNORMAL
EOSINOPHIL # BLD AUTO: 0.1 K/UL (ref 0–0.5)
EOSINOPHIL NFR BLD: 0.5 % (ref 0–8)
ERYTHROCYTE [DISTWIDTH] IN BLOOD BY AUTOMATED COUNT: 14.7 % (ref 11.5–14.5)
EST. GFR  (NO RACE VARIABLE): >60 ML/MIN/1.73 M^2
GLUCOSE SERPL-MCNC: 94 MG/DL (ref 70–110)
HCT VFR BLD AUTO: 27.4 % (ref 40–54)
HGB BLD-MCNC: 8.4 G/DL (ref 14–18)
IMM GRANULOCYTES # BLD AUTO: 0.07 K/UL (ref 0–0.04)
IMM GRANULOCYTES NFR BLD AUTO: 0.6 % (ref 0–0.5)
LIPASE SERPL-CCNC: <3 U/L (ref 4–60)
LYMPHOCYTES # BLD AUTO: 1.5 K/UL (ref 1–4.8)
LYMPHOCYTES NFR BLD: 12.5 % (ref 18–48)
MCH RBC QN AUTO: 25.3 PG (ref 27–31)
MCHC RBC AUTO-ENTMCNC: 30.7 G/DL (ref 32–36)
MCV RBC AUTO: 83 FL (ref 82–98)
MONOCYTES # BLD AUTO: 1 K/UL (ref 0.3–1)
MONOCYTES NFR BLD: 8.3 % (ref 4–15)
NEUTROPHILS # BLD AUTO: 9.2 K/UL (ref 1.8–7.7)
NEUTROPHILS NFR BLD: 77.8 % (ref 38–73)
NRBC BLD-RTO: 0 /100 WBC
PLATELET # BLD AUTO: 394 K/UL (ref 150–450)
PMV BLD AUTO: 8.9 FL (ref 9.2–12.9)
POTASSIUM SERPL-SCNC: 4.9 MMOL/L (ref 3.5–5.1)
PROT SERPL-MCNC: 8 G/DL (ref 6–8.4)
RBC # BLD AUTO: 3.32 M/UL (ref 4.6–6.2)
SARS-COV-2 RDRP RESP QL NAA+PROBE: NEGATIVE
SODIUM SERPL-SCNC: 131 MMOL/L (ref 136–145)
WBC # BLD AUTO: 11.87 K/UL (ref 3.9–12.7)

## 2022-09-05 PROCEDURE — 99285 EMERGENCY DEPT VISIT HI MDM: CPT | Mod: 25,CS

## 2022-09-05 PROCEDURE — 80053 COMPREHEN METABOLIC PANEL: CPT | Performed by: NURSE PRACTITIONER

## 2022-09-05 PROCEDURE — 25000003 PHARM REV CODE 250: Performed by: STUDENT IN AN ORGANIZED HEALTH CARE EDUCATION/TRAINING PROGRAM

## 2022-09-05 PROCEDURE — 85025 COMPLETE CBC W/AUTO DIFF WBC: CPT | Performed by: NURSE PRACTITIONER

## 2022-09-05 PROCEDURE — 63600175 PHARM REV CODE 636 W HCPCS: Performed by: EMERGENCY MEDICINE

## 2022-09-05 PROCEDURE — G0378 HOSPITAL OBSERVATION PER HR: HCPCS

## 2022-09-05 PROCEDURE — 63600175 PHARM REV CODE 636 W HCPCS: Performed by: STUDENT IN AN ORGANIZED HEALTH CARE EDUCATION/TRAINING PROGRAM

## 2022-09-05 PROCEDURE — 96375 TX/PRO/DX INJ NEW DRUG ADDON: CPT

## 2022-09-05 PROCEDURE — 96374 THER/PROPH/DIAG INJ IV PUSH: CPT

## 2022-09-05 PROCEDURE — 96361 HYDRATE IV INFUSION ADD-ON: CPT

## 2022-09-05 PROCEDURE — 96372 THER/PROPH/DIAG INJ SC/IM: CPT | Performed by: STUDENT IN AN ORGANIZED HEALTH CARE EDUCATION/TRAINING PROGRAM

## 2022-09-05 PROCEDURE — 83690 ASSAY OF LIPASE: CPT | Performed by: NURSE PRACTITIONER

## 2022-09-05 PROCEDURE — 87635 SARS-COV-2 COVID-19 AMP PRB: CPT | Performed by: EMERGENCY MEDICINE

## 2022-09-05 RX ORDER — DOBUTAMINE HYDROCHLORIDE 400 MG/100ML
2.5 INJECTION INTRAVENOUS CONTINUOUS
Status: DISCONTINUED | OUTPATIENT
Start: 2022-09-05 | End: 2022-09-06 | Stop reason: HOSPADM

## 2022-09-05 RX ORDER — IBUPROFEN 200 MG
16 TABLET ORAL
Status: DISCONTINUED | OUTPATIENT
Start: 2022-09-05 | End: 2022-09-06 | Stop reason: HOSPADM

## 2022-09-05 RX ORDER — AMOXICILLIN 250 MG
1 CAPSULE ORAL ONCE
Status: COMPLETED | OUTPATIENT
Start: 2022-09-05 | End: 2022-09-05

## 2022-09-05 RX ORDER — SODIUM CHLORIDE 9 MG/ML
INJECTION, SOLUTION INTRAVENOUS CONTINUOUS
Status: DISCONTINUED | OUTPATIENT
Start: 2022-09-05 | End: 2022-09-06 | Stop reason: HOSPADM

## 2022-09-05 RX ORDER — FOLIC ACID 1 MG/1
1 TABLET ORAL DAILY
COMMUNITY

## 2022-09-05 RX ORDER — HEPARIN SODIUM 5000 [USP'U]/ML
5000 INJECTION, SOLUTION INTRAVENOUS; SUBCUTANEOUS EVERY 8 HOURS
Status: DISCONTINUED | OUTPATIENT
Start: 2022-09-05 | End: 2022-09-06 | Stop reason: HOSPADM

## 2022-09-05 RX ORDER — GLUCAGON 1 MG
1 KIT INJECTION
Status: DISCONTINUED | OUTPATIENT
Start: 2022-09-05 | End: 2022-09-06 | Stop reason: HOSPADM

## 2022-09-05 RX ORDER — OXYCODONE HYDROCHLORIDE 5 MG/1
5 TABLET ORAL EVERY 8 HOURS PRN
Status: DISCONTINUED | OUTPATIENT
Start: 2022-09-05 | End: 2022-09-06

## 2022-09-05 RX ORDER — ACETAMINOPHEN 500 MG
500 TABLET ORAL EVERY 8 HOURS PRN
Status: DISCONTINUED | OUTPATIENT
Start: 2022-09-05 | End: 2022-09-06 | Stop reason: HOSPADM

## 2022-09-05 RX ORDER — NALOXONE HCL 0.4 MG/ML
0.02 VIAL (ML) INJECTION
Status: DISCONTINUED | OUTPATIENT
Start: 2022-09-05 | End: 2022-09-06 | Stop reason: HOSPADM

## 2022-09-05 RX ORDER — SODIUM CHLORIDE 9 MG/ML
1000 INJECTION, SOLUTION INTRAVENOUS
Status: DISCONTINUED | OUTPATIENT
Start: 2022-09-05 | End: 2022-09-05

## 2022-09-05 RX ORDER — HYDROMORPHONE HYDROCHLORIDE 1 MG/ML
0.5 INJECTION, SOLUTION INTRAMUSCULAR; INTRAVENOUS; SUBCUTANEOUS
Status: COMPLETED | OUTPATIENT
Start: 2022-09-05 | End: 2022-09-05

## 2022-09-05 RX ORDER — IBUPROFEN 200 MG
24 TABLET ORAL
Status: DISCONTINUED | OUTPATIENT
Start: 2022-09-05 | End: 2022-09-06 | Stop reason: HOSPADM

## 2022-09-05 RX ADMIN — OXYCODONE HYDROCHLORIDE 5 MG: 5 TABLET ORAL at 08:09

## 2022-09-05 RX ADMIN — SODIUM CHLORIDE 250 ML: 9 INJECTION, SOLUTION INTRAVENOUS at 05:09

## 2022-09-05 RX ADMIN — SODIUM CHLORIDE: 9 INJECTION, SOLUTION INTRAVENOUS at 10:09

## 2022-09-05 RX ADMIN — DOBUTAMINE HYDROCHLORIDE 2.5 MCG/KG/MIN: 400 INJECTION INTRAVENOUS at 10:09

## 2022-09-05 RX ADMIN — HEPARIN SODIUM 5000 UNITS: 5000 INJECTION INTRAVENOUS; SUBCUTANEOUS at 10:09

## 2022-09-05 RX ADMIN — HYDROMORPHONE HYDROCHLORIDE 0.5 MG: 1 INJECTION, SOLUTION INTRAMUSCULAR; INTRAVENOUS; SUBCUTANEOUS at 01:09

## 2022-09-05 RX ADMIN — DOCUSATE SODIUM AND SENNOSIDES 1 TABLET: 8.6; 5 TABLET, FILM COATED ORAL at 08:09

## 2022-09-05 NOTE — ED PROVIDER NOTES
SCRIBE #1 NOTE: I, Becky Mcguire, am scribing for, and in the presence of, Mitch Guerrero MD. I have scribed the HPI, ROS, and PEx.      History      Chief Complaint   Patient presents with    Abdominal Pain     Pt complaining of constipation, abdominal pain, and lower back pain since yesterday       Review of patient's allergies indicates:  No Known Allergies     HPI   HPI    9/5/2022, 12:56 PM   History obtained from the patient and the pt's girlfriend at bedside      History of Present Illness: Avery Earl is a 67 y.o. male patient with a PMHx of cancer, cardiomyopathy, CHF, CAD, HLD, HTN, MI, pancreatic cancer, peripheral artery disease, and pulmonary hypertension who presents to the Emergency Department for generalized abdominal pain which onset gradually. Symptoms are constant and moderate in severity. No mitigating or exacerbating factors reported. Associated sxs include constipation and reduced PO intake. Pt reports that his last bowel movement was this morning, but it was small. Patient denies any fever, chills, CP, SOB, N/V, weakness, and all other sxs at this time. Prior Tx includes OTC laxatives and morphine with no relief of sxs. Pt's girlfriend reports that the pt has a pancreatic cancer, but the pt it not undergoing chemotherapy. Pt is a DNR and hospice pt. No further complaints or concerns at this time.         Arrival mode: Personal vehicle    PCP: Magdaleno Escamilla MD       Past Medical History:  Past Medical History:   Diagnosis Date    Arthritis     Cancer     Cardiomyopathy     Ischemic    CHF (congestive heart failure)     Coronary artery disease     Dyslipidemia     Hyperlipidemia     Hypertension     Mitral insufficiency     Myocardial infarction     Pancreatic cancer     Peripheral arterial disease     Pneumonia 12/2021    Pulmonary hypertension        Past Surgical History:  Past Surgical History:   Procedure Laterality Date    CORONARY ARTERY BYPASS GRAFT      Lima to LAD, saphenous vein  graft to right PDA    ENDOSCOPIC ULTRASOUND OF UPPER GASTROINTESTINAL TRACT N/A 2022    Procedure: ULTRASOUND, UPPER GI TRACT, ENDOSCOPIC;  Surgeon: Merritt Darby MD;  Location: [facility];  Service: Endoscopy;  Laterality: N/A;    ERCP N/A 2022    Procedure: ERCP (ENDOSCOPIC RETROGRADE CHOLANGIOPANCREATOGRAPHY);  Surgeon: Merritt Darby MD;  Location: [facility];  Service: Endoscopy;  Laterality: N/A;    INSERTION OF IMPLANTABLE CARDIOVERTER-DEFIBRILLATOR (ICD) GENERATOR WITH TWO EXISTING LEADS           Family History:  Family History   Problem Relation Age of Onset    Heart disease Mother     Heart disease Father        Social History:  Social History     Tobacco Use    Smoking status: Former     Types: Cigarettes     Quit date:      Years since quittin.6    Smokeless tobacco: Never   Substance and Sexual Activity    Alcohol use: Not Currently    Drug use: Never    Sexual activity: Not on file       ROS   Review of Systems   Constitutional:  Positive for appetite change (reduced PO intake). Negative for chills and fever.   HENT:  Negative for sore throat.    Respiratory:  Negative for shortness of breath.    Cardiovascular:  Negative for chest pain.   Gastrointestinal:  Positive for abdominal pain (generalized) and constipation. Negative for nausea and vomiting.   Genitourinary:  Negative for dysuria.   Musculoskeletal:  Negative for back pain.   Skin:  Negative for rash.   Neurological:  Negative for weakness.   Hematological:  Does not bruise/bleed easily.   All other systems reviewed and are negative.    Physical Exam      Initial Vitals [22 1124]   BP Pulse Resp Temp SpO2   (!) 94/47 85 18 97.9 °F (36.6 °C) (!) 94 %      MAP       --          Physical Exam  Nursing Notes and Vital Signs Reviewed.  Constitutional: Patient is in no acute distress. Well-developed and well-nourished.  Head: Atraumatic. Normocephalic.  Eyes: PERRL. EOM intact. Conjunctivae are not pale.  "No scleral icterus.  ENT: Mucous membranes are moist. Oropharynx is clear and symmetric.    Neck: Supple. Full ROM. No lymphadenopathy.  Cardiovascular: Regular rate. Regular rhythm. No murmurs, rubs, or gallops. Distal pulses are 2+ and symmetric.  Pulmonary/Chest: No respiratory distress. Clear to auscultation bilaterally. No wheezing or rales.  Abdominal: Soft. There is fullness on the L side of the abdomen.  There is mild tenderness.  No rebound, guarding, or rigidity.   Musculoskeletal: Moves all extremities. No obvious deformities. No edema.  Skin: Warm and dry.  Neurological:  Alert, awake, and appropriate.  Normal speech.  No acute focal neurological deficits are appreciated.  Psychiatric: Normal affect. Good eye contact. Appropriate in content.    ED Course    Procedures  ED Vital Signs:  Vitals:    09/05/22 1124 09/05/22 1126 09/05/22 1305 09/05/22 1342   BP: (!) 94/47   (!) 93/52   Pulse: 85   84   Resp: 18  18 18   Temp: 97.9 °F (36.6 °C)   98 °F (36.7 °C)   TempSrc:    Oral   SpO2: (!) 94% 98%  98%   Weight: 76.8 kg (169 lb 5 oz)      Height: 6' 2" (1.88 m)       09/05/22 1404 09/05/22 1545 09/05/22 1741   BP: 103/63 103/66 116/67   Pulse: 73 76 78   Resp: 18 18 18   Temp:  98 °F (36.7 °C)    TempSrc:      SpO2:      Weight:      Height:          Abnormal Lab Results:  Labs Reviewed   CBC W/ AUTO DIFFERENTIAL - Abnormal; Notable for the following components:       Result Value    RBC 3.32 (*)     Hemoglobin 8.4 (*)     Hematocrit 27.4 (*)     MCH 25.3 (*)     MCHC 30.7 (*)     RDW 14.7 (*)     MPV 8.9 (*)     Immature Granulocytes 0.6 (*)     Gran # (ANC) 9.2 (*)     Immature Grans (Abs) 0.07 (*)     Gran % 77.8 (*)     Lymph % 12.5 (*)     All other components within normal limits   COMPREHENSIVE METABOLIC PANEL - Abnormal; Notable for the following components:    Sodium 131 (*)     CO2 20 (*)     Calcium 12.5 (*)     Albumin 2.3 (*)     Alkaline Phosphatase 214 (*)     All other components within " normal limits    Narrative:       CA critical result(s) called and verbal readback obtained from HYUN KNOWLES RN  by RAHeather 09/05/2022 13:21   LIPASE - Abnormal; Notable for the following components:    Lipase <3 (*)     All other components within normal limits   URINALYSIS, REFLEX TO URINE CULTURE        All Lab Results:  Results for orders placed or performed during the hospital encounter of 09/05/22   CBC auto differential   Result Value Ref Range    WBC 11.87 3.90 - 12.70 K/uL    RBC 3.32 (L) 4.60 - 6.20 M/uL    Hemoglobin 8.4 (L) 14.0 - 18.0 g/dL    Hematocrit 27.4 (L) 40.0 - 54.0 %    MCV 83 82 - 98 fL    MCH 25.3 (L) 27.0 - 31.0 pg    MCHC 30.7 (L) 32.0 - 36.0 g/dL    RDW 14.7 (H) 11.5 - 14.5 %    Platelets 394 150 - 450 K/uL    MPV 8.9 (L) 9.2 - 12.9 fL    Immature Granulocytes 0.6 (H) 0.0 - 0.5 %    Gran # (ANC) 9.2 (H) 1.8 - 7.7 K/uL    Immature Grans (Abs) 0.07 (H) 0.00 - 0.04 K/uL    Lymph # 1.5 1.0 - 4.8 K/uL    Mono # 1.0 0.3 - 1.0 K/uL    Eos # 0.1 0.0 - 0.5 K/uL    Baso # 0.04 0.00 - 0.20 K/uL    nRBC 0 0 /100 WBC    Gran % 77.8 (H) 38.0 - 73.0 %    Lymph % 12.5 (L) 18.0 - 48.0 %    Mono % 8.3 4.0 - 15.0 %    Eosinophil % 0.5 0.0 - 8.0 %    Basophil % 0.3 0.0 - 1.9 %    Differential Method Automated    Comprehensive metabolic panel   Result Value Ref Range    Sodium 131 (L) 136 - 145 mmol/L    Potassium 4.9 3.5 - 5.1 mmol/L    Chloride 102 95 - 110 mmol/L    CO2 20 (L) 23 - 29 mmol/L    Glucose 94 70 - 110 mg/dL    BUN 17 8 - 23 mg/dL    Creatinine 0.7 0.5 - 1.4 mg/dL    Calcium 12.5 (HH) 8.7 - 10.5 mg/dL    Total Protein 8.0 6.0 - 8.4 g/dL    Albumin 2.3 (L) 3.5 - 5.2 g/dL    Total Bilirubin 0.7 0.1 - 1.0 mg/dL    Alkaline Phosphatase 214 (H) 55 - 135 U/L    AST 26 10 - 40 U/L    ALT 11 10 - 44 U/L    Anion Gap 9 8 - 16 mmol/L    eGFR >60 >60 mL/min/1.73 m^2   Lipase   Result Value Ref Range    Lipase <3 (L) 4 - 60 U/L   POCT COVID-19 Rapid Screening   Result Value Ref Range    POC Rapid COVID  Negative Negative     Acceptable Yes          Imaging Results:  Imaging Results              CT Renal Stone Study ABD Pelvis WO (Final result)  Result time 09/05/22 13:49:26      Final result by BECKY Phan Sr., MD (09/05/22 13:49:26)                   Impression:      1. Interval worsening of the patient's metastatic pancreatic cancer.  There is a persistent stent in the distal aspect of the common bile duct that extends into the head of the pancreas. There is persistent dilatation of the pancreatic duct in the head, neck, and body of the pancreas.  The pancreatic duct in the neck of the pancreas has a diameter of 16 mm on the current examination. On the prior examination it had a diameter of 14 mm.  2. There has been interval worsening of the appearance of the liver. There are hypodense masses scattered throughout the liver.  The inferior aspect of the right lobe of the liver has numerous hypodense masses. One of the larger ones is an ill-defined area that measures 12.1 cm in craniocaudal dimension by 11.4 cm in AP dimension by 6.4 cm in medial-lateral dimension.  This is consistent with the patient's history and characteristic of metastatic disease.  3. There has been interval development of a 17 mm lytic area on the left side of the T11 vertebral body.  This is consistent with the patient's history and characteristic of metastatic disease.  4. There is an 8 mm stone in the gallbladder.  5. There is a small amount of ascites. There is no pneumoperitoneum.  6. There are nonobstructive stones in both kidneys. One of the larger ones measures 6 mm and is located in the midpole of the left kidney.  7. There is a 12 mm oval shaped area of hypodensity in the cortex in the anterior aspect of the inferior pole of the right kidney. This area has a Hounsfield measurement of 10.  This is characteristic of a cyst.  8. There is a prominent amount of fecal material within the rectum.  All CT scans at this  facility use dose modulation, iterative reconstruction, and/or weight base dosing when appropriate to reduce radiation dose when appropriate to reduce radiation dose to as low as reasonably achievable.      Electronically signed by: Javi Phan MD  Date:    09/05/2022  Time:    13:49               Narrative:    EXAMINATION:  CT RENAL STONE STUDY ABD PELVIS WO    CLINICAL HISTORY:  LLQ pain; metastatic pancreatic cancer    TECHNIQUE:  Standard abdomen and pelvis CT protocol without oral or IV contrast was performed.    COMPARISON:  CT examination of the abdomen and pelvis performed on 05/06/2022.    FINDINGS:  Finding: The size of the heart is within normal limits.  There is partial visualization of a cardiac pacemaker.  The lungs are clear. There is no pneumothorax or pleural effusion.    There is a small amount of ascites.  There is no pneumoperitoneum.  There has been interval worsening of the appearance of the liver.  There are hypodense masses scattered throughout the liver.  The inferior aspect of the right lobe of the liver has numerous hypodense masses.  One of the larger ones is an ill-defined area that measures 12.1 cm in craniocaudal dimension by 11.4 cm in AP dimension by 6.4 cm in medial-lateral dimension.  There is an 8 mm stone in the gallbladder.  There is a persistent stent in the distal aspect of the common bile duct that extends into the head of the pancreas.  There is persistent dilatation of the pancreatic duct in the head, neck, and body of the pancreas.  The pancreatic duct in the neck of the pancreas has a diameter of 16 mm on the current examination.  On the prior examination it had a diameter of 14 mm.  The spleen and adrenals are normal in appearance.  There are nonobstructive stones in both kidneys.  One of the larger ones measures 6 mm and is located in the midpole of the left kidney.  There is a 12 mm oval shaped area of hypodensity in the cortex in the anterior aspect of the  inferior pole of the right kidney.  This area has a Hounsfield measurement of 10.  The ureters and the urinary bladder are normal in appearance. The appendix is normal in appearance.  There is a prominent amount of fecal material within the rectum.  There is a marked amount of atherosclerosis.  There has been interval development of a 17 mm lytic area on the left side of the T11 vertebral body.                                       X-Ray Abdomen Flat And Erect (Final result)  Result time 09/05/22 12:16:37      Final result by BECKY Phan Sr., MD (09/05/22 12:16:37)                   Impression:      1. There are several calcific densities projected over the expected location of the left kidney. One of the larger ones measures 4 mm.  This is characteristic of nephrolithiasis.  2. There is a minimal amount of levoconvex curvature of the lumbar spine.  3. There is partial visualization of a cardiac pacemaker.  4. The bowel gas pattern is normal in appearance.      Electronically signed by: Javi Phan MD  Date:    09/05/2022  Time:    12:16               Narrative:    EXAMINATION:  XR ABDOMEN FLAT AND ERECT    CLINICAL HISTORY:  Unspecified abdominal pain    COMPARISON:  07/27/2022    FINDINGS:  The bowel gas pattern is normal in appearance. There is no pneumoperitoneum.  There is a minimal amount of levoconvex curvature of the lumbar spine.  There are several calcific densities projected over the expected location of the left kidney.  One of the larger ones measures 4 mm.  There is partial visualization of a cardiac pacemaker.  There is a mild amount of atherosclerosis.                                              The Emergency Provider reviewed the vital signs and test results, which are outlined above.    ED Discussion      Pt confirms he is DNR and in [facility].  We discussed different options of Home with Hospice, inpt hospice or obs with tx of hypercalcemia/constipation.  We discussed c   Bran and he rec observation.    I discussed c [facility] ([facility]) and they agree c current management they request I place the pt in obs to Dr. Dyson to med/tele and they will handle all additional orders.  The pt agrees with the current tx plan at this time.         ED Medication(s):  Medications   naloxone 0.4 mg/mL injection 0.02 mg (has no administration in time range)   glucose chewable tablet 16 g (has no administration in time range)   glucose chewable tablet 24 g (has no administration in time range)   glucagon (human recombinant) injection 1 mg (has no administration in time range)   heparin (porcine) injection 5,000 Units (has no administration in time range)   dextrose 10% bolus 125 mL (has no administration in time range)   dextrose 10% bolus 250 mL (has no administration in time range)   DOBUtamine 500 mg in D5W 125 mL infusion (has no administration in time range)   sodium chloride 0.9% bolus 250 mL (has no administration in time range)   0.9%  NaCl infusion (has no administration in time range)   senna-docusate 8.6-50 mg per tablet 1 tablet (has no administration in time range)   oxyCODONE immediate release tablet 5 mg (has no administration in time range)   acetaminophen tablet 500 mg (has no administration in time range)   HYDROmorphone injection 0.5 mg (0.5 mg Intravenous Given 9/5/22 1305)   sodium chloride 0.9% bolus 250 mL (250 mLs Intravenous New Bag 9/5/22 7911)           Current Discharge Medication List            Medical Decision Making    Medical Decision Making:   Clinical Tests:   Lab Tests: Ordered and Reviewed  Radiological Study: Ordered and Reviewed         Scribe Attestation:   Scribe #1: I performed the above scribed service and the documentation accurately describes the services I performed. I attest to the accuracy of the note.    Attending:   Physician Attestation Statement for Scribe #1: I, Mitch Guerrero MD, personally performed the services described in this documentation, as  scribed by Becky Mcguire, in my presence, and it is both accurate and complete.          Clinical Impression       ICD-10-CM ICD-9-CM   1. Hypercalcemia  E83.52 275.42   2. Abdominal pain  R10.9 789.00   3. Abdominal pain, unspecified abdominal location  R10.9 789.00   4. Constipation, unspecified constipation type  K59.00 564.00   5. Chest pain  R07.9 786.50       Disposition:   Disposition: Placed in Observation  Condition: Stable       Mitch Guerrero MD  09/05/22 1936

## 2022-09-05 NOTE — PHARMACY MED REC
"Admission Medication History     The home medication history was taken by Buster Shay.    You may go to "Admission" then "Reconcile Home Medications" tabs to review and/or act upon these items.     The home medication list has been updated by the Pharmacy department.   Please read ALL comments highlighted in yellow.   Please address this information as you see fit.    Feel free to contact us if you have any questions or require assistance.      Medications listed below were obtained from: Patient/family and Analytic software- [employer]  (Not in a hospital admission)        Buster Shay  [id number]    Current Outpatient Medications on File Prior to Encounter   Medication Sig Dispense Refill Last Dose    apixaban (ELIQUIS) 5 mg Tab Take 5 mg by mouth once daily.   9/4/2022    aspirin 81 MG Chew Take 81 mg by mouth once daily.   9/4/2022    bumetanide (BUMEX) 1 MG tablet Take 1 mg by mouth once daily.   9/4/2022    cholecalciferol, vitamin D3, (VITAMIN D3) 25 mcg (1,000 unit) capsule Take 1,000 Units by mouth once daily.   9/4/2022    DOBUTamine (DOBUTREX) 1,000 mg/250 mL (4,000 mcg/mL) infusion Inject 227 mcg/min into the vein continuous.   9/5/2022    ENTRESTO 49-51 mg per tablet Take 49-51 tablets by mouth 2 (two) times a day.   9/4/2022    folic acid (FOLVITE) 1 MG tablet Take 1 mg by mouth once daily.   9/4/2022    omega-3/dha/epa/ala/vitamin D3 (OMEGA-3-DHA-EPA-ALA-VIT D3 ORAL) Take 2 capsules by mouth once daily.   9/4/2022    potassium chloride SA (K-DUR,KLOR-CON) 20 MEQ tablet Take 20 mEq by mouth once daily.   9/4/2022    spironolactone (ALDACTONE) 25 MG tablet Take 25 mg by mouth once daily.   9/4/2022    b complex vitamins capsule Take 1 capsule by mouth once daily.                              .        "

## 2022-09-05 NOTE — H&P
H&P    Admit Date: 9/5/2022    Chief Complaint:  Chief Complaint   Patient presents with    Abdominal Pain     Pt complaining of constipation, abdominal pain, and lower back pain since yesterday       HPI:      Mr. Avery Earl is a 67 y.o. male patient with a PMHx of pancreatic cancer with mets to liver, cardiomyopathy, CHF- currently on dobutamine drip at home, CAD s/p CABG/ stents, HLD, HTN, MI, peripheral artery disease, pulmonary hypertension presented to the Emergency Department for generalized abdominal pain with gradual onset over past 4-5 days that gradually worsened.   Symptoms are constant and moderate in severity. No mitigating or exacerbating factors reported. Associated sxs include constipation and reduced PO intake. Pt reports that his last bowel movement was this morning, but it was small. Patient denies any fever, chills, CP, SOB, N/V, weakness, and all other sxs at this time. Prior Tx includes OTC laxatives and morphine with no relief of sxs. Pt's girlfriend/ son/ daughter at bedside- reports that the pt has a pancreatic cancer that was diagnosed in 3/2022- but never underwent any surgery/ chemo or radiation therapy- mentioned that he doesnot follow with oncologist any more.    Follows with cardiologist- Dr. Javi Turk ;    Since 8/2022- patient has been under home hospice care ([facility] )    PMH: pancreatic cancer with mets to liver, cardiomyopathy, CHF- currently on dobutamine drip at home, CAD s/p CABG/ stents, HLD, HTN, MI, peripheral artery disease, pulmonary hypertension;    PSH: CABG; cardiac cath s/p stents    Allergies: none    FH: h/o HF in mother    SH: quit smoking 20 yrs ago; denied alcohol or illicit drug use;           ROS:      Constitutional:  Positive for appetite change (reduced PO intake). Negative for chills and fever.   HENT:  Negative for sore throat.    Respiratory:  Negative for shortness of breath.    Cardiovascular:   Negative for chest pain.   Gastrointestinal:  Positive for abdominal pain (generalized) and constipation. Negative for nausea and vomiting.   Genitourinary:  Negative for dysuria.   Musculoskeletal:  Negative for back pain.   Skin:  Negative for rash.   Neurological:  Negative for weakness.   Hematological:  Does not bruise/bleed easily.         Allergies:    Review of patient's allergies indicates:  No Known Allergies      PMHx:      Past Medical History:   Diagnosis Date    Arthritis     Cancer     Cardiomyopathy     Ischemic    CHF (congestive heart failure)     Coronary artery disease     Dyslipidemia     Hyperlipidemia     Hypertension     Mitral insufficiency     Myocardial infarction     Pancreatic cancer     Peripheral arterial disease     Pneumonia 2021    Pulmonary hypertension         PMSx:      Past Surgical History:   Procedure Laterality Date    CORONARY ARTERY BYPASS GRAFT      [place] to LAD, saphenous vein graft to right PDA    ENDOSCOPIC ULTRASOUND OF UPPER GASTROINTESTINAL TRACT N/A 2022    Procedure: ULTRASOUND, UPPER GI TRACT, ENDOSCOPIC;  Surgeon: Merritt Darby MD;  Location: [facility];  Service: Endoscopy;  Laterality: N/A;    ERCP N/A 2022    Procedure: ERCP (ENDOSCOPIC RETROGRADE CHOLANGIOPANCREATOGRAPHY);  Surgeon: Merritt Darby MD;  Location: [facility];  Service: Endoscopy;  Laterality: N/A;    INSERTION OF IMPLANTABLE CARDIOVERTER-DEFIBRILLATOR (ICD) GENERATOR WITH TWO EXISTING LEADS          Social Hx:      Social History     Socioeconomic History    Marital status: Single   Tobacco Use    Smoking status: Former     Types: Cigarettes     Quit date:      Years since quittin.6    Smokeless tobacco: Never   Substance and Sexual Activity    Alcohol use: Not Currently    Drug use: Never        Family Hx:      Family History   Problem Relation Age of Onset    Heart disease Mother     Heart disease Father     `    Medications:      Current  Facility-Administered Medications   Medication    0.9%  NaCl infusion    acetaminophen tablet 500 mg    dextrose 10% bolus 125 mL    dextrose 10% bolus 250 mL    DOBUtamine 1000 mg in D5W 250 mL infusion    glucagon (human recombinant) injection 1 mg    glucose chewable tablet 16 g    glucose chewable tablet 24 g    heparin (porcine) injection 5,000 Units    naloxone 0.4 mg/mL injection 0.02 mg    oxyCODONE immediate release tablet 5 mg    senna-docusate 8.6-50 mg per tablet 1 tablet    sodium chloride 0.9% bolus 250 mL    sodium chloride 0.9% bolus 250 mL     Current Outpatient Medications   Medication Sig    apixaban (ELIQUIS) 5 mg Tab Take 5 mg by mouth once daily.    aspirin 81 MG Chew Take 81 mg by mouth once daily.    bumetanide (BUMEX) 1 MG tablet Take 1 mg by mouth once daily.    cholecalciferol, vitamin D3, (VITAMIN D3) 25 mcg (1,000 unit) capsule Take 1,000 Units by mouth once daily.    DOBUTamine (DOBUTREX) 1,000 mg/250 mL (4,000 mcg/mL) infusion Inject 227 mcg/min into the vein continuous.    ENTRESTO 49-51 mg per tablet Take 49-51 tablets by mouth 2 (two) times a day.    folic acid (FOLVITE) 1 MG tablet Take 1 mg by mouth once daily.    omega-3/dha/epa/ala/vitamin D3 (OMEGA-3-DHA-EPA-ALA-VIT D3 ORAL) Take 2 capsules by mouth once daily.    potassium chloride SA (K-DUR,KLOR-CON) 20 MEQ tablet Take 20 mEq by mouth once daily.    spironolactone (ALDACTONE) 25 MG tablet Take 25 mg by mouth once daily.    b complex vitamins capsule Take 1 capsule by mouth once daily.       VITALS:      Vital Signs Range (Last 24H):  Temp:  [97.9 °F (36.6 °C)-98 °F (36.7 °C)]   Pulse:  [73-85]   Resp:  [18]   BP: ()/(47-67)   SpO2:  [94 %-98 %]     I & O (Last 24H):  No intake or output data in the 24 hours ending 09/05/22 5235    Physical Exam       Constitutional: Patient is in no acute distress.   Head: Atraumatic. Normocephalic.  Eyes: PERRL. EOM intact. Conjunctivae are not pale. No scleral icterus.  ENT: Mucous  membranes are moist. Oropharynx is clear and symmetric.    Neck: Supple. Full ROM. No lymphadenopathy.  Cardiovascular: Regular rate. Regular rhythm. No murmurs, rubs, or gallops. Distal pulses are 2+ and symmetric.  Pulmonary/Chest: No respiratory distress. Clear to auscultation bilaterally. No wheezing or rales.  Abdominal: Soft. There is fullness on the L side of the abdomen.  There is mild tenderness.  No rebound, guarding, or rigidity.   Musculoskeletal: Moves all extremities. No obvious deformities. No edema.  Skin: Warm and dry.  Neurological:  Alert, awake, and appropriate.  Normal speech.  No acute focal neurological deficits are appreciated.stammering speech noted  Psychiatric: Normal affect. Good eye contact. Appropriate in content.    Laboratory Data:  Reviewed and noted in plan where applicable- Please see chart for full laboratory data.    No results for input(s): CPK, CPKMB, TROPONINI, MB in the last 24 hours. No results for input(s): POCTGLUCOSE in the last 24 hours.     No results found for: INR, PROTIME    Lab Results   Component Value Date    WBC 11.87 09/05/2022    HGB 8.4 (L) 09/05/2022    HCT 27.4 (L) 09/05/2022    MCV 83 09/05/2022     09/05/2022       BMP  Recent Labs   Lab 09/05/22  1236   GLU 94   *   K 4.9      CO2 20*   BUN 17   CREATININE 0.7   CALCIUM 12.5*         Radiology:  Reviewed and noted in plan where applicable- Please see chart for full radiology data.  Imaging Results              CT Renal Stone Study ABD Pelvis WO (Final result)  Result time 09/05/22 13:49:26      Final result by BECKY Phan Sr., MD (09/05/22 13:49:26)                   Impression:      1. Interval worsening of the patient's metastatic pancreatic cancer.  There is a persistent stent in the distal aspect of the common bile duct that extends into the head of the pancreas. There is persistent dilatation of the pancreatic duct in the head, neck, and body of the pancreas.  The pancreatic  duct in the neck of the pancreas has a diameter of 16 mm on the current examination. On the prior examination it had a diameter of 14 mm.  2. There has been interval worsening of the appearance of the liver. There are hypodense masses scattered throughout the liver.  The inferior aspect of the right lobe of the liver has numerous hypodense masses. One of the larger ones is an ill-defined area that measures 12.1 cm in craniocaudal dimension by 11.4 cm in AP dimension by 6.4 cm in medial-lateral dimension.  This is consistent with the patient's history and characteristic of metastatic disease.  3. There has been interval development of a 17 mm lytic area on the left side of the T11 vertebral body.  This is consistent with the patient's history and characteristic of metastatic disease.  4. There is an 8 mm stone in the gallbladder.  5. There is a small amount of ascites. There is no pneumoperitoneum.  6. There are nonobstructive stones in both kidneys. One of the larger ones measures 6 mm and is located in the midpole of the left kidney.  7. There is a 12 mm oval shaped area of hypodensity in the cortex in the anterior aspect of the inferior pole of the right kidney. This area has a Hounsfield measurement of 10.  This is characteristic of a cyst.  8. There is a prominent amount of fecal material within the rectum.  All CT scans at this facility use dose modulation, iterative reconstruction, and/or weight base dosing when appropriate to reduce radiation dose when appropriate to reduce radiation dose to as low as reasonably achievable.      Electronically signed by: Javi Phan MD  Date:    09/05/2022  Time:    13:49               Narrative:    EXAMINATION:  CT RENAL STONE STUDY ABD PELVIS WO    CLINICAL HISTORY:  LLQ pain; metastatic pancreatic cancer    TECHNIQUE:  Standard abdomen and pelvis CT protocol without oral or IV contrast was performed.    COMPARISON:  CT examination of the abdomen and pelvis performed on  05/06/2022.    FINDINGS:  Finding: The size of the heart is within normal limits.  There is partial visualization of a cardiac pacemaker.  The lungs are clear. There is no pneumothorax or pleural effusion.    There is a small amount of ascites.  There is no pneumoperitoneum.  There has been interval worsening of the appearance of the liver.  There are hypodense masses scattered throughout the liver.  The inferior aspect of the right lobe of the liver has numerous hypodense masses.  One of the larger ones is an ill-defined area that measures 12.1 cm in craniocaudal dimension by 11.4 cm in AP dimension by 6.4 cm in medial-lateral dimension.  There is an 8 mm stone in the gallbladder.  There is a persistent stent in the distal aspect of the common bile duct that extends into the head of the pancreas.  There is persistent dilatation of the pancreatic duct in the head, neck, and body of the pancreas.  The pancreatic duct in the neck of the pancreas has a diameter of 16 mm on the current examination.  On the prior examination it had a diameter of 14 mm.  The spleen and adrenals are normal in appearance.  There are nonobstructive stones in both kidneys.  One of the larger ones measures 6 mm and is located in the midpole of the left kidney.  There is a 12 mm oval shaped area of hypodensity in the cortex in the anterior aspect of the inferior pole of the right kidney.  This area has a Hounsfield measurement of 10.  The ureters and the urinary bladder are normal in appearance. The appendix is normal in appearance.  There is a prominent amount of fecal material within the rectum.  There is a marked amount of atherosclerosis.  There has been interval development of a 17 mm lytic area on the left side of the T11 vertebral body.                                       X-Ray Abdomen Flat And Erect (Final result)  Result time 09/05/22 12:16:37      Final result by BECKY Phan Sr., MD (09/05/22 12:16:37)                    Impression:      1. There are several calcific densities projected over the expected location of the left kidney. One of the larger ones measures 4 mm.  This is characteristic of nephrolithiasis.  2. There is a minimal amount of levoconvex curvature of the lumbar spine.  3. There is partial visualization of a cardiac pacemaker.  4. The bowel gas pattern is normal in appearance.      Electronically signed by: Javi Phan MD  Date:    09/05/2022  Time:    12:16               Narrative:    EXAMINATION:  XR ABDOMEN FLAT AND ERECT    CLINICAL HISTORY:  Unspecified abdominal pain    COMPARISON:  07/27/2022    FINDINGS:  The bowel gas pattern is normal in appearance. There is no pneumoperitoneum.  There is a minimal amount of levoconvex curvature of the lumbar spine.  There are several calcific densities projected over the expected location of the left kidney.  One of the larger ones measures 4 mm.  There is partial visualization of a cardiac pacemaker.  There is a mild amount of atherosclerosis.                                      ASSESSMENT/PLAN:     ACTIVE PROBLEMS:    Patient Active Problem List   Diagnosis    Chronic combined systolic and diastolic congestive heart failure    Nonrheumatic mitral valve regurgitation    Pulmonary hypertension due to left heart disease    Hyperlipidemia LDL goal <70    Claudication in peripheral vascular disease    Coronary artery disease involving native coronary artery of native heart without angina pectoris    Ischemic cardiomyopathy    On continuous oral anticoagulation    Pancreatic mass    Microcytic anemia    Weakness    Malignant neoplasm of head of pancreas           Assessment and plan:    Abdominal pain/ constipation: probably secondary to obstruction from underlying pancreatic mass/ chronic opioid use  Abdominal pain over past 4-5 days associated with constipation;   IVF, laxatives; pain management;     Hypercalcemia probably secondary to underlying malignancy  Corrected  calcium 13.4  No altered mentation  IVF gently given h/o Ischemic cardiomyopathy with EF 20% s/p ICD    Pancreatic cancer with mets to liver  CT abd/ pelvis showed Interval worsening of the patient's metastatic pancreatic cancer  Currently under hospice care    CHF  Currently on dobutamine drip at home;  Will continue;    CAD  s/p CABG/ stents    Hyperlipidemia  Noted    H/o pulmonary hypertension    H/o non rheumatic mitral valve regurgitation      Prophylaxis -       Dvt- heparin    Code status: hospice ; will f/u with CM to discuss regarding providing more frequent home hospice nurse visits;          Family discussion: discussed with family (girlfriend/ son/ daughter) in detail- agreed to the current plan;       Disposition/discharge - IVF management for hypercalcemia; f/u with  for home hospice arrangements;          Rigo Dejesus MD  Department of Hospital Medicine   [facility] - Med Surg 3

## 2022-09-05 NOTE — ED NOTES
Dr. Dejesus with hospital medicine at bedside for physical exam, family at bedside. Patient resting comfortably, NAD

## 2022-09-06 VITALS
HEIGHT: 74 IN | OXYGEN SATURATION: 100 % | SYSTOLIC BLOOD PRESSURE: 104 MMHG | WEIGHT: 173.31 LBS | BODY MASS INDEX: 22.24 KG/M2 | TEMPERATURE: 98 F | HEART RATE: 73 BPM | DIASTOLIC BLOOD PRESSURE: 59 MMHG | RESPIRATION RATE: 18 BRPM

## 2022-09-06 PROBLEM — E83.52 HYPERCALCEMIA: Status: ACTIVE | Noted: 2022-09-06

## 2022-09-06 PROBLEM — K59.00 CONSTIPATION: Status: ACTIVE | Noted: 2022-09-06

## 2022-09-06 PROBLEM — R10.9 ABDOMINAL PAIN: Status: ACTIVE | Noted: 2022-09-06

## 2022-09-06 PROBLEM — K59.00 CONSTIPATION: Status: RESOLVED | Noted: 2022-09-06 | Resolved: 2022-09-06

## 2022-09-06 PROBLEM — Z51.5 PALLIATIVE CARE ENCOUNTER: Status: ACTIVE | Noted: 2022-09-06

## 2022-09-06 PROBLEM — R10.9 ABDOMINAL PAIN: Status: RESOLVED | Noted: 2022-09-06 | Resolved: 2022-09-06

## 2022-09-06 LAB
ANION GAP SERPL CALC-SCNC: 8 MMOL/L (ref 8–16)
BASOPHILS # BLD AUTO: 0.03 K/UL (ref 0–0.2)
BASOPHILS NFR BLD: 0.3 % (ref 0–1.9)
BUN SERPL-MCNC: 15 MG/DL (ref 8–23)
CALCIUM SERPL-MCNC: 11.6 MG/DL (ref 8.7–10.5)
CHLORIDE SERPL-SCNC: 105 MMOL/L (ref 95–110)
CO2 SERPL-SCNC: 20 MMOL/L (ref 23–29)
CREAT SERPL-MCNC: 0.6 MG/DL (ref 0.5–1.4)
DIFFERENTIAL METHOD: ABNORMAL
EOSINOPHIL # BLD AUTO: 0.1 K/UL (ref 0–0.5)
EOSINOPHIL NFR BLD: 1 % (ref 0–8)
ERYTHROCYTE [DISTWIDTH] IN BLOOD BY AUTOMATED COUNT: 14.2 % (ref 11.5–14.5)
EST. GFR  (NO RACE VARIABLE): >60 ML/MIN/1.73 M^2
GLUCOSE SERPL-MCNC: 71 MG/DL (ref 70–110)
HCT VFR BLD AUTO: 22.6 % (ref 40–54)
HGB BLD-MCNC: 7.4 G/DL (ref 14–18)
IMM GRANULOCYTES # BLD AUTO: 0.05 K/UL (ref 0–0.04)
IMM GRANULOCYTES NFR BLD AUTO: 0.4 % (ref 0–0.5)
LYMPHOCYTES # BLD AUTO: 1.4 K/UL (ref 1–4.8)
LYMPHOCYTES NFR BLD: 12.4 % (ref 18–48)
MAGNESIUM SERPL-MCNC: 1.4 MG/DL (ref 1.6–2.6)
MCH RBC QN AUTO: 26.3 PG (ref 27–31)
MCHC RBC AUTO-ENTMCNC: 32.7 G/DL (ref 32–36)
MCV RBC AUTO: 80 FL (ref 82–98)
MONOCYTES # BLD AUTO: 1 K/UL (ref 0.3–1)
MONOCYTES NFR BLD: 8.7 % (ref 4–15)
NEUTROPHILS # BLD AUTO: 8.7 K/UL (ref 1.8–7.7)
NEUTROPHILS NFR BLD: 77.2 % (ref 38–73)
NRBC BLD-RTO: 0 /100 WBC
PHOSPHATE SERPL-MCNC: 2.9 MG/DL (ref 2.7–4.5)
PLATELET # BLD AUTO: 345 K/UL (ref 150–450)
PMV BLD AUTO: 9 FL (ref 9.2–12.9)
POTASSIUM SERPL-SCNC: 4.2 MMOL/L (ref 3.5–5.1)
RBC # BLD AUTO: 2.81 M/UL (ref 4.6–6.2)
SODIUM SERPL-SCNC: 133 MMOL/L (ref 136–145)
WBC # BLD AUTO: 11.25 K/UL (ref 3.9–12.7)

## 2022-09-06 PROCEDURE — 99215 PR OFFICE/OUTPT VISIT, EST, LEVL V, 40-54 MIN: ICD-10-PCS | Mod: GV,,, | Performed by: PHYSICIAN ASSISTANT

## 2022-09-06 PROCEDURE — 25000003 PHARM REV CODE 250: Performed by: STUDENT IN AN ORGANIZED HEALTH CARE EDUCATION/TRAINING PROGRAM

## 2022-09-06 PROCEDURE — 84100 ASSAY OF PHOSPHORUS: CPT | Performed by: STUDENT IN AN ORGANIZED HEALTH CARE EDUCATION/TRAINING PROGRAM

## 2022-09-06 PROCEDURE — 36415 COLL VENOUS BLD VENIPUNCTURE: CPT | Performed by: STUDENT IN AN ORGANIZED HEALTH CARE EDUCATION/TRAINING PROGRAM

## 2022-09-06 PROCEDURE — 63600175 PHARM REV CODE 636 W HCPCS: Performed by: STUDENT IN AN ORGANIZED HEALTH CARE EDUCATION/TRAINING PROGRAM

## 2022-09-06 PROCEDURE — 83735 ASSAY OF MAGNESIUM: CPT | Performed by: STUDENT IN AN ORGANIZED HEALTH CARE EDUCATION/TRAINING PROGRAM

## 2022-09-06 PROCEDURE — 99497 ADVNCD CARE PLAN 30 MIN: CPT | Mod: 25,GV,, | Performed by: PHYSICIAN ASSISTANT

## 2022-09-06 PROCEDURE — G0378 HOSPITAL OBSERVATION PER HR: HCPCS

## 2022-09-06 PROCEDURE — 99497 PR ADVNCD CARE PLAN 30 MIN: ICD-10-PCS | Mod: 25,GV,, | Performed by: PHYSICIAN ASSISTANT

## 2022-09-06 PROCEDURE — 25000003 PHARM REV CODE 250: Performed by: INTERNAL MEDICINE

## 2022-09-06 PROCEDURE — 99215 OFFICE O/P EST HI 40 MIN: CPT | Mod: GV,,, | Performed by: PHYSICIAN ASSISTANT

## 2022-09-06 PROCEDURE — 80048 BASIC METABOLIC PNL TOTAL CA: CPT | Performed by: STUDENT IN AN ORGANIZED HEALTH CARE EDUCATION/TRAINING PROGRAM

## 2022-09-06 PROCEDURE — 96372 THER/PROPH/DIAG INJ SC/IM: CPT | Performed by: STUDENT IN AN ORGANIZED HEALTH CARE EDUCATION/TRAINING PROGRAM

## 2022-09-06 PROCEDURE — 85025 COMPLETE CBC W/AUTO DIFF WBC: CPT | Performed by: STUDENT IN AN ORGANIZED HEALTH CARE EDUCATION/TRAINING PROGRAM

## 2022-09-06 RX ORDER — AMOXICILLIN 250 MG
1 CAPSULE ORAL 2 TIMES DAILY
Qty: 30 TABLET | Refills: 0 | Status: SHIPPED | OUTPATIENT
Start: 2022-09-06

## 2022-09-06 RX ORDER — MORPHINE SULFATE ORAL SOLUTION 10 MG/5ML
10 SOLUTION ORAL
Status: DISCONTINUED | OUTPATIENT
Start: 2022-09-06 | End: 2022-09-06 | Stop reason: HOSPADM

## 2022-09-06 RX ORDER — OXYCODONE HYDROCHLORIDE 5 MG/1
5 TABLET ORAL EVERY 6 HOURS PRN
Qty: 14 TABLET | Refills: 0 | Status: SHIPPED | OUTPATIENT
Start: 2022-09-06

## 2022-09-06 RX ORDER — AMOXICILLIN 250 MG
1 CAPSULE ORAL 2 TIMES DAILY
Status: DISCONTINUED | OUTPATIENT
Start: 2022-09-06 | End: 2022-09-06 | Stop reason: HOSPADM

## 2022-09-06 RX ORDER — MORPHINE SULFATE 15 MG/1
15 TABLET ORAL EVERY 8 HOURS PRN
Qty: 12 TABLET | Refills: 0 | Status: SHIPPED | OUTPATIENT
Start: 2022-09-06

## 2022-09-06 RX ORDER — OXYCODONE HYDROCHLORIDE 5 MG/1
5 TABLET ORAL EVERY 6 HOURS PRN
Status: DISCONTINUED | OUTPATIENT
Start: 2022-09-06 | End: 2022-09-06 | Stop reason: HOSPADM

## 2022-09-06 RX ADMIN — MORPHINE SULFATE 10 MG: 10 SOLUTION ORAL at 07:09

## 2022-09-06 RX ADMIN — HEPARIN SODIUM 5000 UNITS: 5000 INJECTION INTRAVENOUS; SUBCUTANEOUS at 05:09

## 2022-09-06 RX ADMIN — DOCUSATE SODIUM AND SENNOSIDES 1 TABLET: 8.6; 5 TABLET, FILM COATED ORAL at 11:09

## 2022-09-06 RX ADMIN — OXYCODONE HYDROCHLORIDE 5 MG: 5 TABLET ORAL at 10:09

## 2022-09-06 RX ADMIN — Medication 1 ENEMA: at 11:09

## 2022-09-06 NOTE — NURSING
Discharge instructions reviewed with patient and his family at bedside; all verbalized understanding. PIV intact on removal. RUE PICC left CDI; dressing changed today. AVS and printed prescriptions given to patient. Awaiting delivery of patient's home dobutamine to attach to his home pump. All belongings packed up with patient's family.

## 2022-09-06 NOTE — PLAN OF CARE
[facility] - Telemetry (Hospital)  Initial Discharge Assessment       Primary Care Provider: Magdaleno Escamilla MD    Admission Diagnosis: Hypercalcemia [E83.52]  Abdominal pain [R10.9]  Chest pain [R07.9]  Abdominal pain, unspecified abdominal location [R10.9]  Constipation, unspecified constipation type [K59.00]    Admission Date: 9/5/2022  Expected Discharge Date:     Discharge Barriers Identified: None    Payor: [employer] MANAGED MEDICARE / Plan: [employer] MEDICARE HMO / Product Type: Capitation /     Extended Emergency Contact Information  Primary Emergency Contact: Jovany Earl  Mobile Phone: [phone]  Relation: Healthcare Power of   Secondary Emergency Contact: EarlAvery jones   Mobile Phone: [phone]  Relation: Son    Discharge Plan A: Hospice/home         [id number] - [place] - [address]  [id number] [address]  Phone: [phone] Fax: [phone]      Initial Assessment (most recent)       Adult Discharge Assessment - 09/06/22 1016          Discharge Assessment    Assessment Type Discharge Planning Assessment     Confirmed/corrected address, phone number and insurance Yes     Confirmed Demographics Correct on Facesheet     Source of Information patient;family     Communicated GREG with patient/caregiver Date not available/Unable to determine     Lives With significant other     Facility Arrived From: home     Do you expect to return to your current living situation? Yes     Do you have help at home or someone to help you manage your care at home? Yes     Who are your caregiver(s) and their phone number(s)? [facility]     Prior to hospitilization cognitive status: Alert/Oriented     Current cognitive status: Alert/Oriented     Walking or Climbing Stairs Difficulty stair climbing difficulty, dependent;transferring difficulty, assistance 1 person;ambulation difficulty, dependent;ambulation difficulty, assistance 1 person     Mobility Management  primarily w/c bound     Dressing/Bathing Difficulty bathing difficulty, assistance 1 person;dressing difficulty, assistance 1 person     Equipment Currently Used at Home wheelchair     Readmission within 30 days? No     Patient currently being followed by outpatient case management? No     Do you currently have service(s) that help you manage your care at home? Yes     Is the pt/caregiver preference to resume services with current agency No     Do you take prescription medications? Yes     Do you have prescription coverage? Yes     Do you have any problems affording any of your prescribed medications? No     Is the patient taking medications as prescribed? yes     How do you get to doctors appointments? other (see comments)   may need transportation assistance    Are you on dialysis? No     Discharge Plan A Hospice/home     DME Needed Upon Discharge  wheelchair;bath bench;bedside commode     Discharge Plan discussed with: Spouse/sig other;Patient;Adult children     Name(s) and Number(s) Avery Dietrich (son:[phone]) significant otherDelmi ([phone])     Discharge Barriers Identified None                      Anticipated DC Dispo: home with hospice  Prior Level of Function: Mostly stays in bed, can get up to transfer to w/c.  PCP: Dr. Escamilla  Comments:  CM met with patient, significant other (Delmi), daughter and son (Avery Dietrich) at bedside regarding d/c planning. Patient and family previously with [facility] and requesting to change to a different hospice agency. CM provided list of local Hospice agencies with an inpt hospice unit per MD recommendation. Family chose [facility] as inpt unit is closest to home.    CM explained home hospice and criteria to qualify for inpt unit, family verbalized understanding. CM sent referral via careport and notified liaison, Lien. Hospice is aware patient has dobutamine gtt and wants to continue this. Lien will be at bedside this morning to meet with family.    [facility] contacted and informed family wishes to discontinue services.

## 2022-09-06 NOTE — PLAN OF CARE
[facility] - Telemetry (Hospital)  Discharge Final Note    Primary Care Provider: Magdaleno Escamilla MD    Expected Discharge Date: 9/6/2022    Final Discharge Note (most recent)       Final Note - 09/06/22 1225          Final Note    Assessment Type Final Discharge Note     Anticipated Discharge Disposition Hospice/Home     Hospital Resources/Appts/Education Provided Post-Acute resouces added to AVS        Post-Acute Status    Post-Acute Authorization Hospice     Hospice Status Set-up Complete/Auth obtained                     Important Message from Medicare           DC Dispo: home with [facility]  Family notified: family at bedside  Transport: w/c per son's private vehicle    [facility] aware of d/c.

## 2022-09-06 NOTE — DISCHARGE SUMMARY
[facility] - Telemetry ([facility])  [facility] Medicine  Discharge Summary      Patient Name: Avery Earl  MRN: [id number]  Patient Class: OP- Observation  Admission Date: 9/5/2022  Hospital Length of Stay: 0 days  Discharge Date and Time: 9/6/2022  Attending Physician: Rigo Dejesus, *   Discharging Provider: Rigo Dejesus MD  Primary Care Provider: Magdaleno Escamilla MD      HPI/ Hospital Course:   HPI:    Mr. Avery Earl is a 67 y.o. male patient with a PMHx of pancreatic cancer with mets to liver, cardiomyopathy, CHF- currently on dobutamine drip at home, CAD s/p CABG/ stents, HLD, HTN, MI, peripheral artery disease, pulmonary hypertension presented to the Emergency Department for generalized abdominal pain with gradual onset over past 4-5 days that gradually worsened.   Symptoms are constant and moderate in severity. No mitigating or exacerbating factors reported. Associated sxs include constipation and reduced PO intake. Pt reports that his last bowel movement was this morning, but it was small. Patient denies any fever, chills, CP, SOB, N/V, weakness, and all other sxs at this time. Prior Tx includes OTC laxatives and morphine with no relief of sxs. Pt's girlfriend/ son/ daughter at bedside- reports that the pt has a pancreatic cancer that was diagnosed in 3/2022- but never underwent any surgery/ chemo or radiation therapy- mentioned that he doesnot follow with oncologist any more.     Follows with cardiologist- Dr. Javi Turk ;     Since 8/2022- patient has been under home hospice care ([facility] )      Hospital course:    Pt presented with abdominal pain, constipation. Also noted to have hypercalcemia.  IV fluids, pain meds and laxatives were ordered.    9/6/2022:    Improvement in pain noted. Ca trended down. Pt alert and oriented.  Family preferred change in hospice care team. CM provided options and family opted for [place] hospice care team.  Arrangements for  home needs were made accordingly.  Patient and family agreed to the current plan.  Planning to discharge patient home with home hospice.                Goals of Care Treatment Preferences:  Code Status: DNR    Health care agent: Jovany Earl  [facility] agent number: [phone]                   Consults:   Consults (From admission, onward)        Status Ordering Provider     Inpatient consult to Palliative Care  Once        Provider:  PEYTON Franklin KANTHA RATNAM R.          No new Assessment & Plan notes have been filed under this hospital service since the last note was generated.  Service: Hospital Medicine    Final Active Diagnoses:    Diagnosis Date Noted POA    Hypercalcemia [E83.52] 09/06/2022 Unknown      Problems Resolved During this Admission:    Diagnosis Date Noted Date Resolved POA    Abdominal pain [R10.9] 09/06/2022 09/06/2022 Unknown    Constipation [K59.00] 09/06/2022 09/06/2022 Unknown       Discharged Condition: stable    Disposition:     Follow Up:    Patient Instructions:   No discharge procedures on file.    Significant Diagnostic Studies: Labs:   CMP   Recent Labs   Lab 09/05/22  1236 09/06/22  0438   * 133*   K 4.9 4.2    105   CO2 20* 20*   GLU 94 71   BUN 17 15   CREATININE 0.7 0.6   CALCIUM 12.5* 11.6*   PROT 8.0  --    ALBUMIN 2.3*  --    BILITOT 0.7  --    ALKPHOS 214*  --    AST 26  --    ALT 11  --    ANIONGAP 9 8   , CBC   Recent Labs   Lab 09/05/22  1236 09/06/22  0438   WBC 11.87 11.25   HGB 8.4* 7.4*   HCT 27.4* 22.6*    345   , INR No results found for: INR, PROTIME and Lipid Panel No results found for: CHOL, HDL, LDLCALC, TRIG, CHOLHDL    Pending Diagnostic Studies:     None         Medications:  Reconciled Home Medications:      Medication List      START taking these medications    morphine 15 MG tablet  Commonly known as: MSIR  Take 1 tablet (15 mg total) by mouth every 8 (eight) hours as needed for Pain.      oxyCODONE 5 MG immediate release tablet  Commonly known as: ROXICODONE  Take 1 tablet (5 mg total) by mouth every 6 (six) hours as needed.     senna-docusate 8.6-50 mg 8.6-50 mg per tablet  Commonly known as: PERICOLACE  Take 1 tablet by mouth 2 (two) times daily.        CONTINUE taking these medications    apixaban 5 mg Tab  Commonly known as: ELIQUIS  Take 5 mg by mouth once daily.     aspirin 81 MG Chew  Take 81 mg by mouth once daily.     b complex vitamins capsule  Take 1 capsule by mouth once daily.     bumetanide 1 MG tablet  Commonly known as: BUMEX  Take 1 mg by mouth once daily.     cholecalciferol (vitamin D3) 25 mcg (1,000 unit) capsule  Commonly known as: VITAMIN D3  Take 1,000 Units by mouth once daily.     DOBUTamine 1,000 mg/250 mL (4,000 mcg/mL) infusion  Commonly known as: DOBUTREX  Inject 227 mcg/min into the vein continuous.     ENTRESTO 49-51 mg per tablet  Generic drug: sacubitriL-valsartan  Take 49-51 tablets by mouth 2 (two) times a day.     folic acid 1 MG tablet  Commonly known as: FOLVITE  Take 1 mg by mouth once daily.     OMEGA-3-DHA-EPA-ALA-VIT D3 ORAL  Take 2 capsules by mouth once daily.     potassium chloride SA 20 MEQ tablet  Commonly known as: K-DUR,KLOR-CON  Take 20 mEq by mouth once daily.     spironolactone 25 MG tablet  Commonly known as: ALDACTONE  Take 25 mg by mouth once daily.            Indwelling Lines/Drains at time of discharge:   Lines/Drains/Airways     Peripherally Inserted Central Catheter Line  Duration           PICC Double Lumen right brachial -- days                Time spent on the discharge of patient: 55 minutes         Rigo Dejesus MD  Department of Hospital Medicine  [facility] - Telemetry ([facility])

## 2022-09-06 NOTE — CONSULTS
Advance Care Planning    Consult Note  Palliative Medicine      Consult Requested By: Rigo Dejesus, *  Reason for Consult: Goals of care    SUBJECTIVE:     History of Present Illness:  Avery Earl is a 67 y.o. year old with a history of systolic CHF on home  and metastatuc pancreatic adenocarcinoma who presented to the emergency department complaining of abdominal pain. He was on service of [facility] as he elected to continue his cardiac medications but revoked to come to the hospital for evaluation. Imaging indicated progressive metastatic disease and constipation. Palliative Medicine was consulted to assist with goals of care discussion. Family had already decided to change hospice agencies and were meeting with [facility] when I went to the room. I discussed why I was consulted to assist in the patient's care with children Avery Dietrich, Jovany, and Alena. We discussed the progressive disease and I specifically wanted to talk about the utility of  as I fear it could prolong his further decline from malignancy and its complications. They had discussed this topic last week with the hospice nurse but no decisions were made yet. He admits that he knew his cancer would continue to progress until God calls him home. He would like to continue  until he is a burden on his family and qualifies this with bedbound status. He is currently able to get out of bed but says it takes him an hour to get ready for the hospice nurse to arrive. His pain resolved with the large BM and has not been taking the comfort medications in the home. I added that if his pain is worsening or has evidence of advanced hepatic failure that these would also be times to consider stopping . He says that he is a realist and knows that God is calling him home soon but does not want his family to be upset. His son Avery Dietrich left the room a few times while we were talking and all kids were crying but never questioned  his decisions. I will defer to hospice staff for ongoing conversation regarding benefit vs burden of .        Past Medical History:   Diagnosis Date    Arthritis     Cancer     Cardiomyopathy     Ischemic    CHF (congestive heart failure)     Coronary artery disease     Dyslipidemia     Hyperlipidemia     Hypertension     Mitral insufficiency     Myocardial infarction     Pancreatic cancer     Peripheral arterial disease     Pneumonia 2021    Pulmonary hypertension      Past Surgical History:   Procedure Laterality Date    CORONARY ARTERY BYPASS GRAFT      [place] to LAD, saphenous vein graft to right PDA    ENDOSCOPIC ULTRASOUND OF UPPER GASTROINTESTINAL TRACT N/A 2022    Procedure: ULTRASOUND, UPPER GI TRACT, ENDOSCOPIC;  Surgeon: Merritt Darby MD;  Location: [facility];  Service: Endoscopy;  Laterality: N/A;    ERCP N/A 2022    Procedure: ERCP (ENDOSCOPIC RETROGRADE CHOLANGIOPANCREATOGRAPHY);  Surgeon: Merritt Darby MD;  Location: [facility];  Service: Endoscopy;  Laterality: N/A;    INSERTION OF IMPLANTABLE CARDIOVERTER-DEFIBRILLATOR (ICD) GENERATOR WITH TWO EXISTING LEADS       Family History   Problem Relation Age of Onset    Heart disease Mother     Heart disease Father        Social History     Socioeconomic History    Marital status: Single   Tobacco Use    Smoking status: Former     Types: Cigarettes     Quit date:      Years since quittin.7    Smokeless tobacco: Never   Substance and Sexual Activity    Alcohol use: Not Currently    Drug use: Never      Review of patient's allergies indicates:  No Known Allergies    Medications:    Current Facility-Administered Medications:     0.9%  NaCl infusion, , Intravenous, Continuous, Jose Rafael Dyson MD, Last Rate: 50 mL/hr at 22, New Bag at 22    acetaminophen tablet 500 mg, 500 mg, Oral, Q8H PRN, Rigo Dejesus MD    dextrose 10% bolus 125 mL, 12.5 g, Intravenous, PRN, Rigo PENA  MD Oleg    dextrose 10% bolus 250 mL, 25 g, Intravenous, PRN, Rigo Dejesus MD    DOBUtamine 500 mg in D5W 125 mL infusion, 2.5 mcg/kg/min, Intravenous, Continuous, Rigo Dejesus MD, Last Rate: 2.9 mL/hr at 09/05/22 2243, 2.5 mcg/kg/min at 09/05/22 2243    glucagon (human recombinant) injection 1 mg, 1 mg, Intramuscular, PRN, Rigo Dejesus MD    glucose chewable tablet 16 g, 16 g, Oral, PRN, Rigo Dejesus MD    glucose chewable tablet 24 g, 24 g, Oral, PRN, Rigo Dejesus MD    heparin (porcine) injection 5,000 Units, 5,000 Units, Subcutaneous, Q8H, Rigo Dejesus MD, 5,000 Units at 09/06/22 0551    morphine 10 mg/5 mL solution 10 mg, 10 mg, Oral, Q3H PRN, Jose Rafael Dyson MD, 10 mg at 09/06/22 0755    naloxone 0.4 mg/mL injection 0.02 mg, 0.02 mg, Intravenous, PRN, Rigo Dejesus MD    oxyCODONE immediate release tablet 5 mg, 5 mg, Oral, Q6H PRN, Rigo Dejesus MD, 5 mg at 09/06/22 1039    senna-docusate 8.6-50 mg per tablet 1 tablet, 1 tablet, Oral, BID, Rigo Dejesus MD, 1 tablet at 09/06/22 1113    ROS:  Review of Systems   Constitutional:  Negative for appetite change, chills and fever.   HENT:  Negative for mouth sores, sore throat and trouble swallowing.    Respiratory:  Negative for cough and shortness of breath.    Gastrointestinal:  Negative for abdominal pain, constipation, diarrhea, nausea and vomiting.   Genitourinary:  Negative for difficulty urinating and dysuria.   Musculoskeletal:  Negative for back pain and myalgias.   Skin:  Negative for rash and wound.   Allergic/Immunologic: Negative for immunocompromised state.   Neurological:  Negative for weakness and headaches.   Psychiatric/Behavioral:  Negative for confusion and sleep disturbance.      OBJECTIVE:     Physical Exam:  Vitals: Temp: 98.2 °F (36.8 °C) (09/06/22 0839)  Pulse: 76 (09/06/22 0839)  Resp: 17 (09/06/22 1039)  BP: (!) 116/59 (09/06/22  0839)  SpO2: 100 % (09/06/22 0839)    Physical Exam  Vitals reviewed.   Constitutional:       General: He is not in acute distress.     Appearance: Normal appearance. He is well-developed.   HENT:      Head: Normocephalic and atraumatic.   Eyes:      Conjunctiva/sclera: Conjunctivae normal.   Cardiovascular:      Rate and Rhythm: Normal rate and regular rhythm.      Heart sounds: Normal heart sounds. No murmur heard.    No friction rub.   Pulmonary:      Effort: Pulmonary effort is normal. No respiratory distress.      Breath sounds: Normal breath sounds. No wheezing or rales.   Abdominal:      General: Bowel sounds are normal. There is no distension.      Palpations: Abdomen is soft.      Tenderness: There is no abdominal tenderness.   Musculoskeletal:         General: No deformity. Normal range of motion.      Cervical back: Normal range of motion.   Skin:     General: Skin is warm and dry.      Findings: No rash.   Neurological:      Mental Status: He is alert and oriented to person, place, and time.      Cranial Nerves: No cranial nerve deficit.   Psychiatric:         Mood and Affect: Mood normal.         Behavior: Behavior normal.         Thought Content: Thought content normal.         Judgment: Judgment normal.         Review of Symptoms      Symptom Assessment (ESAS 0-10 Scale)  Pain:  0  Dyspnea:  0  Anxiety:  0  Nausea:  0  Depression:  0  Anorexia:  0  Fatigue:  0  Insomnia:  0  Restlessness:  0  Agitation:  0     Constipation:  Negative    Constipation:  No constipation    ECOG Performance Status ndGndrndanddndend:nd nd2nd Advance Directives:   Medical Power of : Yes    Agent's Name:  1- Jovany Earl [phone],  2. stevie Varela Jr [phone], 3: girlfriend Delmi Mabry [phone]    Labs:  WBC   Date Value Ref Range Status   09/06/2022 11.25 3.90 - 12.70 K/uL Final       Hemoglobin   Date Value Ref Range Status   09/06/2022 7.4 (L) 14.0 - 18.0 g/dL Final       Hematocrit   Date Value Ref Range Status    09/06/2022 22.6 (L) 40.0 - 54.0 % Final       MCV   Date Value Ref Range Status   09/06/2022 80 (L) 82 - 98 fL Final       Platelets   Date Value Ref Range Status   09/06/2022 345 150 - 450 K/uL Final       BMP  Lab Results   Component Value Date     (L) 09/06/2022    K 4.2 09/06/2022     09/06/2022    CO2 20 (L) 09/06/2022    BUN 15 09/06/2022    CREATININE 0.6 09/06/2022    CALCIUM 11.6 (H) 09/06/2022    ANIONGAP 8 09/06/2022    ESTGFRAFRICA >60 07/27/2022    EGFRNONAA >60 07/27/2022       Lab Results   Component Value Date    AST 26 09/05/2022    ALKPHOS 214 (H) 09/05/2022    BILITOT 0.7 09/05/2022       Albumin   Date Value Ref Range Status   09/05/2022 2.3 (L) 3.5 - 5.2 g/dL Final       Radiology:I have reviewed all pertinent imaging results/findings within the past 24 hours.  CT renal stone 9/5/22 reviewed    ASSESSMENT   Avery Earl is a 67 y.o. year old with a history of systolic CHF on home  and metastatuc pancreatic adenocarcinoma who presented to the emergency department complaining of abdominal pain. He was on service of [facility] as he elected to continue his cardiac medications but revoked to come to the hospital for evaluation. Imaging indicated progressive metastatic disease and constipation. Palliative Medicine was consulted to assist with goals of care discussion.     PLAN   Encounter for Palliative Care  - Code status: DNR/I  - HCPOA- 1:daughter Jovany, 2: Avery Dietrich, 3: significant other Delmi  - Details of meeting in [facility] [place]  - Family are enrolling with another hospice agency. We discussed thresholds for discontinuing  and he would like to stop when he is bedbound (feels like this would mean he is a burden on his family), pain worsens, or liver failure.  - Defer to hospice staff for ongoing education and discussion re     2. Metastatic pancreatic cancer  - He is not pursuing cancer targeted treatment or surgery for fear or worsening severe CHF    3. Advanced HF  -  Under the care of Dr. Turk on  @ 2.5mcg/kg/ min    4. Abdominal pain  - Pain resolved with enema, agree with current pain regimen      Thank you for allowing Palliative Medicine to be involved in the care of Avery Earl.         Medical decision making: HIGH based on high risk of death untreated symptoms high risk medications poor prognosis management of more than one chronic illness in exacerbation or progression of disease managing side effects of medications or polypharmacy    25 min ACP time spent discussing: assessed patient specific goals and addressed the best way to achieve them, coordination of care and emotional support, formulating and communicating prognosis, exploring burden/ benefit of various approaches of treatment, reviewed advance directive documents.      Sonia Seay PA-C  Palliative Medicine

## 2022-09-06 NOTE — HOSPITAL COURSE
HPI:    Mr. Avery Earl is a 67 y.o. male patient with a PMHx of pancreatic cancer with mets to liver, cardiomyopathy, CHF- currently on dobutamine drip at home, CAD s/p CABG/ stents, HLD, HTN, MI, peripheral artery disease, pulmonary hypertension presented to the Emergency Department for generalized abdominal pain with gradual onset over past 4-5 days that gradually worsened.   Symptoms are constant and moderate in severity. No mitigating or exacerbating factors reported. Associated sxs include constipation and reduced PO intake. Pt reports that his last bowel movement was this morning, but it was small. Patient denies any fever, chills, CP, SOB, N/V, weakness, and all other sxs at this time. Prior Tx includes OTC laxatives and morphine with no relief of sxs. Pt's girlfriend/ son/ daughter at bedside- reports that the pt has a pancreatic cancer that was diagnosed in 3/2022- but never underwent any surgery/ chemo or radiation therapy- mentioned that he doesnot follow with oncologist any more.     Follows with cardiologist- Dr. Javi Turk ;     Since 8/2022- patient has been under home hospice care ([facility] )      Hospital course:    Pt presented with abdominal pain, constipation. Also noted to have hypercalcemia.  IV fluids, pain meds and laxatives were ordered.    9/6/2022:    Improvement in pain noted. Ca trended down. Pt alert and oriented.  Family preferred change in hospice care team. CM provided options and family opted for [facility] hospice care team.  Arrangements for home needs were made accordingly.  Patient and family agreed to the current plan.  Planning to discharge patient home with home hospice.

## 2022-09-08 ENCOUNTER — TELEPHONE (OUTPATIENT)
Dept: HEMATOLOGY/ONCOLOGY | Facility: CLINIC | Age: 67
End: 2022-09-08
Payer: MEDICARE

## 2022-09-08 NOTE — TELEPHONE ENCOUNTER
----- Message from Deanna Cooley sent at 9/7/2022 11:56 AM CDT -----  Contact: [employer] [facility] is calling in regards to verify Date of service and Diagnosis. Please call her back at [phone]  the ref #[id number].           Thanks  CF

## 2022-09-15 ENCOUNTER — CLINICAL SUPPORT (OUTPATIENT)
Dept: CARDIOLOGY | Facility: HOSPITAL | Age: 67
End: 2022-09-15
Payer: MEDICARE

## 2022-09-15 DIAGNOSIS — Z95.810 PRESENCE OF AUTOMATIC (IMPLANTABLE) CARDIAC DEFIBRILLATOR: ICD-10-CM

## 2022-09-15 PROCEDURE — 93296 REM INTERROG EVL PM/IDS: CPT | Performed by: INTERNAL MEDICINE

## 2022-09-15 PROCEDURE — 93295 CARDIAC DEVICE CHECK - REMOTE: ICD-10-PCS | Mod: GW,,, | Performed by: INTERNAL MEDICINE

## 2022-09-15 PROCEDURE — 93295 DEV INTERROG REMOTE 1/2/MLT: CPT | Mod: GW,,, | Performed by: INTERNAL MEDICINE
